# Patient Record
Sex: FEMALE | NOT HISPANIC OR LATINO | Employment: FULL TIME | URBAN - METROPOLITAN AREA
[De-identification: names, ages, dates, MRNs, and addresses within clinical notes are randomized per-mention and may not be internally consistent; named-entity substitution may affect disease eponyms.]

---

## 2019-01-31 ENCOUNTER — OFFICE VISIT (OUTPATIENT)
Dept: URGENT CARE | Facility: CLINIC | Age: 34
End: 2019-01-31
Payer: COMMERCIAL

## 2019-01-31 VITALS
OXYGEN SATURATION: 98 % | SYSTOLIC BLOOD PRESSURE: 122 MMHG | HEIGHT: 64 IN | BODY MASS INDEX: 35.76 KG/M2 | TEMPERATURE: 98 F | RESPIRATION RATE: 18 BRPM | WEIGHT: 209.44 LBS | HEART RATE: 83 BPM | DIASTOLIC BLOOD PRESSURE: 60 MMHG

## 2019-01-31 DIAGNOSIS — J06.9 VIRAL URI WITH COUGH: Primary | ICD-10-CM

## 2019-01-31 LAB
CTP QC/QA: YES
FLUAV AG NPH QL: NEGATIVE
FLUBV AG NPH QL: NEGATIVE

## 2019-01-31 PROCEDURE — 99203 PR OFFICE/OUTPT VISIT, NEW, LEVL III, 30-44 MIN: ICD-10-PCS | Mod: S$GLB,,, | Performed by: FAMILY MEDICINE

## 2019-01-31 PROCEDURE — 87804 POCT INFLUENZA A/B: ICD-10-PCS | Mod: QW,S$GLB,, | Performed by: FAMILY MEDICINE

## 2019-01-31 PROCEDURE — 99203 OFFICE O/P NEW LOW 30 MIN: CPT | Mod: S$GLB,,, | Performed by: FAMILY MEDICINE

## 2019-01-31 PROCEDURE — 3008F PR BODY MASS INDEX (BMI) DOCUMENTED: ICD-10-PCS | Mod: CPTII,S$GLB,, | Performed by: FAMILY MEDICINE

## 2019-01-31 PROCEDURE — 87804 INFLUENZA ASSAY W/OPTIC: CPT | Mod: QW,S$GLB,, | Performed by: FAMILY MEDICINE

## 2019-01-31 PROCEDURE — 3008F BODY MASS INDEX DOCD: CPT | Mod: CPTII,S$GLB,, | Performed by: FAMILY MEDICINE

## 2019-01-31 RX ORDER — BENZONATATE 200 MG/1
200 CAPSULE ORAL 3 TIMES DAILY PRN
Qty: 30 CAPSULE | Refills: 0 | Status: SHIPPED | OUTPATIENT
Start: 2019-01-31 | End: 2020-01-28

## 2019-01-31 NOTE — PATIENT INSTRUCTIONS
PLEASE READ YOUR DISCHARGE INSTRUCTIONS ENTIRELY AS IT CONTAINS IMPORTANT INFORMATION.      Please drink plenty of fluids.    Please get plenty of rest.    Please return here or go to the Emergency Department for any concerns or worsening of condition.    Please take an over the counter antihistamine medication (allegra/Claritin/Zyrtec) of your choice as directed.    Try an over the counter decongestant like Mucinex D or Sudafed.     If you do have Hypertension or palpitations, it is safe to take Coricidin HBP for relief of sinus symptoms.    If not allergic, please take over the counter Tylenol (Acetaminophen) and/or Motrin (Ibuprofen) as directed for control of pain and/or fever.  Please follow up with your primary care doctor or specialist as needed.    Sore throat recommendations: Warm fluids, warm salt water gargles, throat lozenges, tea, honey, soup, rest, hydration.    Use over the counter flonase: one spray each nostril twice daily OR two sprays each nostril once daily.     If you  smoke, please stop smoking.      Please return or see your primary care doctor if you develop new or worsening symptoms.     Please arrange follow up with your primary medical clinic as soon as possible. You must understand that you've received an Urgent Care treatment only and that you may be released before all of your medical problems are known or treated. You, the patient, will arrange for follow up as instructed. If your symptoms worsen or fail to improve you should go to the Emergency Room.    Viral Upper Respiratory Illness (Adult)  You have a viral upper respiratory illness (URI), which is another term for the common cold. This illness is contagious during the first few days. It is spread through the air by coughing and sneezing. It may also be spread by direct contact (touching the sick person and then touching your own eyes, nose, or mouth). Frequent handwashing will decrease risk of spread. Most viral illnesses go away  within 7 to 10 days with rest and simple home remedies. Sometimes the illness may last for several weeks. Antibiotics will not kill a virus, and they are generally not prescribed for this condition.    Home care  · If symptoms are severe, rest at home for the first 2 to 3 days. When you resume activity, don't let yourself get too tired.  · Avoid being exposed to cigarette smoke (yours or others).  · You may use acetaminophen or ibuprofen to control pain and fever, unless another medicine was prescribed. (Note: If you have chronic liver or kidney disease, have ever had a stomach ulcer or gastrointestinal bleeding, or are taking blood-thinning medicines, talk with your healthcare provider before using these medicines.) Aspirin should never be given to anyone under 18 years of age who is ill with a viral infection or fever. It may cause severe liver or brain damage.  · Your appetite may be poor, so a light diet is fine. Avoid dehydration by drinking 6 to 8 glasses of fluids per day (water, soft drinks, juices, tea, or soup). Extra fluids will help loosen secretions in the nose and lungs.  · Over-the-counter cold medicines will not shorten the length of time youre sick, but they may be helpful for the following symptoms: cough, sore throat, and nasal and sinus congestion. (Note: Do not use decongestants if you have high blood pressure.)  Follow-up care  Follow up with your healthcare provider, or as advised.  When to seek medical advice  Call your healthcare provider right away if any of these occur:  · Cough with lots of colored sputum (mucus)  · Severe headache; face, neck, or ear pain  · Difficulty swallowing due to throat pain  · Fever of 100.4°F (38°C)  Call 911, or get immediate medical care  Call emergency services right away if any of these occur:  · Chest pain, shortness of breath, wheezing, or difficulty breathing  · Coughing up blood  · Inability to swallow due to throat pain  Date Last Reviewed:  9/13/2015  © 6032-6170 [employer]. [address] [place] [address], [place] [address]. All rights reserved. This information is not intended as a substitute for professional medical care. Always follow your healthcare professional's instructions.

## 2019-01-31 NOTE — PROGRESS NOTES
"Subjective:       Patient ID: Shaina Mas is a 33 y.o. female.    Vitals:  height is 5' 4" (1.626 m) and weight is 95 kg (209 lb 7 oz). Her tympanic temperature is 98.2 °F (36.8 °C). Her blood pressure is 122/60 and her pulse is 83. Her respiration is 18 and oxygen saturation is 98%.     Chief Complaint: Fever    Patient presents with c/o fever that presented 4 days ago. 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 Concerned for flu. Nsaids taken. Did not get vaccine.       Fever    This is a new problem. Episode onset: Monday. The problem has been unchanged. Associated symptoms include coughing and a sore throat. Pertinent negatives include no congestion, ear pain, nausea, rash, vomiting or wheezing. She has tried NSAIDs for the symptoms.       Constitution: Positive for fever. Negative for chills, sweating and fatigue.   HENT: Positive for sore throat. Negative for ear pain, congestion, sinus pain, sinus pressure and voice change.    Neck: Negative for painful lymph nodes.   Eyes: Negative for eye redness.   Respiratory: Positive for cough. Negative for chest tightness, sputum production, bloody sputum, COPD, shortness of breath, stridor, wheezing and asthma.    Gastrointestinal: Negative for nausea and vomiting.   Musculoskeletal: Negative for muscle ache.   Skin: Negative for rash.   Allergic/Immunologic: Negative for seasonal allergies and asthma.   Hematologic/Lymphatic: Negative for swollen lymph nodes.       Objective:      Physical Exam   Constitutional: She is oriented to person, place, and time. She appears well-developed and well-nourished. She is cooperative.  Non-toxic appearance. She does not appear ill. No distress.   HENT:   Head: Normocephalic and atraumatic.   Right Ear: Hearing, tympanic membrane, external ear and ear canal normal.   Left Ear: Hearing, tympanic membrane, external ear and ear canal normal.   Nose: Mucosal edema present. No rhinorrhea or " nasal deformity. No epistaxis. Right sinus exhibits no maxillary sinus tenderness and no frontal sinus tenderness. Left sinus exhibits no maxillary sinus tenderness and no frontal sinus tenderness.   Mouth/Throat: Uvula is midline, oropharynx is clear and moist and mucous membranes are normal. No trismus in the jaw. Normal dentition. No uvula swelling. No oropharyngeal exudate or posterior oropharyngeal erythema.   Eyes: Conjunctivae and lids are normal. No scleral icterus.   Sclera clear bilat   Neck: Trachea normal, full passive range of motion without pain and phonation normal. Neck supple.   Cardiovascular: Normal rate, regular rhythm, normal heart sounds, intact distal pulses and normal pulses.   Pulmonary/Chest: Effort normal and breath sounds normal. No respiratory distress. She has no decreased breath sounds. She has no wheezes. She has no rhonchi. She has no rales.   Abdominal: Soft. Normal appearance and bowel sounds are normal. She exhibits no distension. There is no tenderness.   Musculoskeletal: Normal range of motion. She exhibits no edema or deformity.   Neurological: She is alert and oriented to person, place, and time. She exhibits normal muscle tone. Coordination normal.   Skin: Skin is warm, dry and intact. She is not diaphoretic. No pallor.   Psychiatric: She has a normal mood and affect. Her speech is normal and behavior is normal. Judgment and thought content normal. Cognition and memory are normal.   Nursing note and vitals reviewed.      Results for orders placed or performed in visit on 01/31/19   POCT Influenza A/B   Result Value Ref Range    Rapid Influenza A Ag Negative Negative    Rapid Influenza B Ag Negative Negative     Acceptable Yes        Assessment:       1. Viral URI with cough        Plan:         Viral URI with cough  -     POCT Influenza A/B  -     benzonatate (TESSALON) 200 MG capsule; Take 1 capsule (200 mg total) by mouth 3 (three) times daily as needed for  Cough.  Dispense: 30 capsule; Refill: 0          Patient Instructions   PLEASE READ YOUR DISCHARGE INSTRUCTIONS ENTIRELY AS IT CONTAINS IMPORTANT INFORMATION.      Please drink plenty of fluids.    Please get plenty of rest.    Please return here or go to the Emergency Department for any concerns or worsening of condition.    Please take an over the counter antihistamine medication (allegra/Claritin/Zyrtec) of your choice as directed.    Try an over the counter decongestant like Mucinex D or Sudafed.     If you do have Hypertension or palpitations, it is safe to take Coricidin HBP for relief of sinus symptoms.    If not allergic, please take over the counter Tylenol (Acetaminophen) and/or Motrin (Ibuprofen) as directed for control of pain and/or fever.  Please follow up with your primary care doctor or specialist as needed.    Sore throat recommendations: Warm fluids, warm salt water gargles, throat lozenges, tea, honey, soup, rest, hydration.    Use over the counter flonase: one spray each nostril twice daily OR two sprays each nostril once daily.     If you  smoke, please stop smoking.      Please return or see your primary care doctor if you develop new or worsening symptoms.     Please arrange follow up with your primary medical clinic as soon as possible. You must understand that you've received an Urgent Care treatment only and that you may be released before all of your medical problems are known or treated. You, the patient, will arrange for follow up as instructed. If your symptoms worsen or fail to improve you should go to the Emergency Room.    Viral Upper Respiratory Illness (Adult)  You have a viral upper respiratory illness (URI), which is another term for the common cold. This illness is contagious during the first few days. It is spread through the air by coughing and sneezing. It may also be spread by direct contact (touching the sick person and then touching your own eyes, nose, or mouth). Frequent  handwashing will decrease risk of spread. Most viral illnesses go away within 7 to 10 days with rest and simple home remedies. Sometimes the illness may last for several weeks. Antibiotics will not kill a virus, and they are generally not prescribed for this condition.    Home care  · If symptoms are severe, rest at home for the first 2 to 3 days. When you resume activity, don't let yourself get too tired.  · Avoid being exposed to cigarette smoke (yours or others).  · You may use acetaminophen or ibuprofen to control pain and fever, unless another medicine was prescribed. (Note: If you have chronic liver or kidney disease, have ever had a stomach ulcer or gastrointestinal bleeding, or are taking blood-thinning medicines, talk with your healthcare provider before using these medicines.) Aspirin should never be given to anyone under 18 years of age who is ill with a viral infection or fever. It may cause severe liver or brain damage.  · Your appetite may be poor, so a light diet is fine. Avoid dehydration by drinking 6 to 8 glasses of fluids per day (water, soft drinks, juices, tea, or soup). Extra fluids will help loosen secretions in the nose and lungs.  · Over-the-counter cold medicines will not shorten the length of time youre sick, but they may be helpful for the following symptoms: cough, sore throat, and nasal and sinus congestion. (Note: Do not use decongestants if you have high blood pressure.)  Follow-up care  Follow up with your healthcare provider, or as advised.  When to seek medical advice  Call your healthcare provider right away if any of these occur:  · Cough with lots of colored sputum (mucus)  · Severe headache; face, neck, or ear pain  · Difficulty swallowing due to throat pain  · Fever of 100.4°F (38°C)  Call 911, or get immediate medical care  Call emergency services right away if any of these occur:  · Chest pain, shortness of breath, wheezing, or difficulty breathing  · Coughing up  blood  · Inability to swallow due to throat pain  Date Last Reviewed: 9/13/2015  © 7544-1921 [employer], [employer]. [address] [place] [address], [place] [address]. All rights reserved. This information is not intended as a substitute for professional medical care. Always follow your healthcare professional's instructions.

## 2019-01-31 NOTE — LETTER
January 31, 2019      [facility]  [address] [facility] [address]  Phone: [phone]  Fax: [phone]       Patient: Shaina Mas   YOB: 1985  Date of Visit: 01/31/2019    To Whom It May Concern:    Nevaeh Mas  was at [facility] on 01/31/2019. She may return to work/school on 2/4/19 with no restrictions. If you have any questions or concerns, or if I can be of further assistance, please do not hesitate to contact me.    Sincerely,        Omari Muse NP

## 2020-01-28 ENCOUNTER — OFFICE VISIT (OUTPATIENT)
Dept: URGENT CARE | Facility: CLINIC | Age: 35
End: 2020-01-28
Payer: COMMERCIAL

## 2020-01-28 VITALS
HEIGHT: 65 IN | WEIGHT: 209 LBS | SYSTOLIC BLOOD PRESSURE: 140 MMHG | TEMPERATURE: 98 F | DIASTOLIC BLOOD PRESSURE: 88 MMHG | OXYGEN SATURATION: 96 % | RESPIRATION RATE: 18 BRPM | BODY MASS INDEX: 34.82 KG/M2 | HEART RATE: 102 BPM

## 2020-01-28 DIAGNOSIS — R68.89 FLU-LIKE SYMPTOMS: ICD-10-CM

## 2020-01-28 DIAGNOSIS — J06.9 UPPER RESPIRATORY TRACT INFECTION, UNSPECIFIED TYPE: Primary | ICD-10-CM

## 2020-01-28 DIAGNOSIS — R31.9 HEMATURIA, UNSPECIFIED TYPE: ICD-10-CM

## 2020-01-28 DIAGNOSIS — Z32.00 POSSIBLE PREGNANCY: ICD-10-CM

## 2020-01-28 DIAGNOSIS — R05.9 COUGH: ICD-10-CM

## 2020-01-28 LAB
B-HCG UR QL: NEGATIVE
BILIRUB UR QL STRIP: NEGATIVE
CTP QC/QA: YES
CTP QC/QA: YES
FLUAV AG NPH QL: NEGATIVE
FLUBV AG NPH QL: NEGATIVE
GLUCOSE UR QL STRIP: NEGATIVE
KETONES UR QL STRIP: NEGATIVE
LEUKOCYTE ESTERASE UR QL STRIP: NEGATIVE
PH, POC UA: 5 (ref 5–8)
POC BLOOD, URINE: POSITIVE
POC NITRATES, URINE: NEGATIVE
PROT UR QL STRIP: NEGATIVE
SP GR UR STRIP: 1.01 (ref 1–1.03)
UROBILINOGEN UR STRIP-ACNC: NORMAL (ref 0.1–1.1)

## 2020-01-28 PROCEDURE — 99000 PR SPECIMEN HANDLING,DR OFF->LAB: ICD-10-PCS | Mod: S$GLB,,, | Performed by: EMERGENCY MEDICINE

## 2020-01-28 PROCEDURE — 81025 URINE PREGNANCY TEST: CPT | Mod: S$GLB,,, | Performed by: NURSE PRACTITIONER

## 2020-01-28 PROCEDURE — 81003 POCT URINALYSIS, DIPSTICK, AUTOMATED, W/O SCOPE: ICD-10-PCS | Mod: QW,S$GLB,, | Performed by: NURSE PRACTITIONER

## 2020-01-28 PROCEDURE — 87804 POCT INFLUENZA A/B: ICD-10-PCS | Mod: QW,S$GLB,, | Performed by: NURSE PRACTITIONER

## 2020-01-28 PROCEDURE — 99000 SPECIMEN HANDLING OFFICE-LAB: CPT | Mod: S$GLB,,, | Performed by: EMERGENCY MEDICINE

## 2020-01-28 PROCEDURE — 99214 PR OFFICE/OUTPT VISIT, EST, LEVL IV, 30-39 MIN: ICD-10-PCS | Mod: 25,S$GLB,, | Performed by: NURSE PRACTITIONER

## 2020-01-28 PROCEDURE — 87804 INFLUENZA ASSAY W/OPTIC: CPT | Mod: QW,S$GLB,, | Performed by: NURSE PRACTITIONER

## 2020-01-28 PROCEDURE — 81003 URINALYSIS AUTO W/O SCOPE: CPT | Mod: QW,S$GLB,, | Performed by: NURSE PRACTITIONER

## 2020-01-28 PROCEDURE — 87086 URINE CULTURE/COLONY COUNT: CPT

## 2020-01-28 PROCEDURE — 81025 POCT URINE PREGNANCY: ICD-10-PCS | Mod: S$GLB,,, | Performed by: NURSE PRACTITIONER

## 2020-01-28 PROCEDURE — 99214 OFFICE O/P EST MOD 30 MIN: CPT | Mod: 25,S$GLB,, | Performed by: NURSE PRACTITIONER

## 2020-01-28 RX ORDER — ALBUTEROL SULFATE 90 UG/1
2 AEROSOL, METERED RESPIRATORY (INHALATION) EVERY 6 HOURS PRN
Qty: 18 G | Refills: 0 | Status: SHIPPED | OUTPATIENT
Start: 2020-01-28 | End: 2023-05-11 | Stop reason: SDUPTHER

## 2020-01-28 RX ORDER — BENZONATATE 100 MG/1
100 CAPSULE ORAL 3 TIMES DAILY PRN
Qty: 20 CAPSULE | Refills: 0 | Status: SHIPPED | OUTPATIENT
Start: 2020-01-28 | End: 2022-06-16

## 2020-01-28 RX ORDER — PROMETHAZINE HYDROCHLORIDE AND DEXTROMETHORPHAN HYDROBROMIDE 6.25; 15 MG/5ML; MG/5ML
5 SYRUP ORAL NIGHTLY PRN
Qty: 120 ML | Refills: 0 | Status: SHIPPED | OUTPATIENT
Start: 2020-01-28 | End: 2020-02-07

## 2020-01-28 NOTE — PROGRESS NOTES
"Subjective:       Patient ID: Shaina Mas is a 34 y.o. female.    Vitals:  height is 5' 4.96" (1.65 m) and weight is 94.8 kg (209 lb). Her temperature is 98.3 °F (36.8 °C). Her blood pressure is 140/88 (abnormal) and her pulse is 102. Her respiration is 18 and oxygen saturation is 96%.     Chief Complaint: Cough    Pt c/o body aches, cough, fever,chills, sweats that began 2 days ago. Pt says she is also having light vaginal bleeding and breast tenderness. Pt says she has a history of irregular periods.   Provider note begins below  Pt has been using plan B lately.  She reports smoking recenlty and this may be the cause of the cough.      Cough   This is a new problem. The current episode started in the past 7 days. The problem has been unchanged. The problem occurs constantly. The cough is productive of sputum. Associated symptoms include chills, a fever and myalgias. Pertinent negatives include no ear pain, eye redness, hemoptysis, rash, sore throat, shortness of breath or wheezing. Treatments tried: nyquil. The treatment provided no relief.       Constitution: Positive for chills, sweating, fatigue and fever.   HENT: Negative for ear pain, congestion, sinus pain, sinus pressure, sore throat and voice change.    Neck: Negative for painful lymph nodes.   Eyes: Negative for eye redness.   Respiratory: Positive for cough and sputum production. Negative for chest tightness, bloody sputum, COPD, shortness of breath, stridor, wheezing and asthma.    Gastrointestinal: Negative for nausea and vomiting.   Genitourinary: Positive for vaginal bleeding.   Musculoskeletal: Positive for muscle ache.   Skin: Negative for rash.   Allergic/Immunologic: Negative for seasonal allergies and asthma.   Hematologic/Lymphatic: Negative for swollen lymph nodes.       Objective:      Physical Exam   Constitutional: She is oriented to person, place, and time. She appears well-developed and well-nourished. She is cooperative.  Non-toxic " appearance. She does not have a sickly appearance. She does not appear ill. No distress.   HENT:   Head: Normocephalic and atraumatic.   Right Ear: Hearing, tympanic membrane, external ear and ear canal normal.   Left Ear: Hearing, tympanic membrane, external ear and ear canal normal.   Nose: Nose normal. No mucosal edema, rhinorrhea or nasal deformity. No epistaxis. Right sinus exhibits no maxillary sinus tenderness and no frontal sinus tenderness. Left sinus exhibits no maxillary sinus tenderness and no frontal sinus tenderness.   Mouth/Throat: Uvula is midline, oropharynx is clear and moist and mucous membranes are normal. No trismus in the jaw. Normal dentition. No uvula swelling. No oropharyngeal exudate, posterior oropharyngeal edema or posterior oropharyngeal erythema.   Eyes: Conjunctivae and lids are normal. No scleral icterus.   Neck: Trachea normal, full passive range of motion without pain and phonation normal. Neck supple. No neck rigidity. No edema and no erythema present.   Cardiovascular: Normal rate, regular rhythm, normal heart sounds, intact distal pulses and normal pulses.   Pulmonary/Chest: Effort normal and breath sounds normal. No stridor. No respiratory distress. She has no decreased breath sounds. She has no wheezes. She has no rhonchi. She has no rales.   Abdominal: Normal appearance and bowel sounds are normal. There is no tenderness. There is no rigidity, no rebound, no guarding and no CVA tenderness.   Musculoskeletal: Normal range of motion. She exhibits no edema or deformity.   Neurological: She is alert and oriented to person, place, and time. She exhibits normal muscle tone. Coordination normal.   Skin: Skin is warm, dry, intact, not diaphoretic and not pale.   Psychiatric: She has a normal mood and affect. Her speech is normal and behavior is normal. Judgment and thought content normal. Cognition and memory are normal.   Nursing note and vitals reviewed.          Results for orders  placed or performed in visit on 01/28/20   POCT Influenza A/B   Result Value Ref Range    Rapid Influenza A Ag Negative Negative    Rapid Influenza B Ag Negative Negative     Acceptable Yes    POCT urine pregnancy   Result Value Ref Range    POC Preg Test, Ur Negative Negative     Acceptable Yes    POCT Urinalysis, Dipstick, Automated, W/O Scope   Result Value Ref Range    POC Blood, Urine Positive (A) Negative    POC Bilirubin, Urine Negative Negative    POC Urobilinogen, Urine normal 0.1 - 1.1    POC Ketones, Urine Negative Negative    POC Protein, Urine Negative Negative    POC Nitrates, Urine Negative Negative    POC Glucose, Urine Negative Negative    pH, UA 5.0 5 - 8    POC Specific Gravity, Urine 1.015 1.003 - 1.029    POC Leukocytes, Urine Negative Negative     Assessment:       1. Upper respiratory tract infection, unspecified type    2. Flu-like symptoms    3. Possible pregnancy    4. Cough    5. Hematuria, unspecified type        Plan:       UPT negative.  Negative flu test.  Will send urine for culture.    Upper respiratory tract infection, unspecified type  -     albuterol (PROVENTIL/VENTOLIN HFA) 90 mcg/actuation inhaler; Inhale 2 puffs into the lungs every 6 (six) hours as needed for Wheezing or Shortness of Breath. Rescue  Dispense: 18 g; Refill: 0  -     benzonatate (TESSALON PERLES) 100 MG capsule; Take 1 capsule (100 mg total) by mouth 3 (three) times daily as needed.  Dispense: 20 capsule; Refill: 0    Flu-like symptoms  -     POCT Influenza A/B    Possible pregnancy  -     POCT urine pregnancy    Cough  -     albuterol (PROVENTIL/VENTOLIN HFA) 90 mcg/actuation inhaler; Inhale 2 puffs into the lungs every 6 (six) hours as needed for Wheezing or Shortness of Breath. Rescue  Dispense: 18 g; Refill: 0  -     benzonatate (TESSALON PERLES) 100 MG capsule; Take 1 capsule (100 mg total) by mouth 3 (three) times daily as needed.  Dispense: 20 capsule; Refill: 0    Hematuria,  unspecified type  -     Urine culture  -     POCT Urinalysis, Dipstick, Automated, W/O Scope    Other orders  -     promethazine-dextromethorphan (PROMETHAZINE-DM) 6.25-15 mg/5 mL Syrp; Take 5 mLs by mouth nightly as needed.  Dispense: 120 mL; Refill: 0         Patient Instructions     Viral Upper Respiratory Illness (Adult)  You have a viral upper respiratory illness (URI), which is another term for the common cold. This illness is contagious during the first few days. It is spread through the air by coughing and sneezing. It may also be spread by direct contact (touching the sick person and then touching your own eyes, nose, or mouth). Frequent handwashing will decrease risk of spread. Most viral illnesses go away within 7 to 10 days with rest and simple home remedies. Sometimes the illness may last for several weeks. Antibiotics will not kill a virus, and they are generally not prescribed for this condition.    Home care  · If symptoms are severe, rest at home for the first 2 to 3 days. When you resume activity, don't let yourself get too tired.  · Avoid being exposed to cigarette smoke (yours or others).  · You may use acetaminophen or ibuprofen to control pain and fever, unless another medicine was prescribed. (Note: If you have chronic liver or kidney disease, have ever had a stomach ulcer or gastrointestinal bleeding, or are taking blood-thinning medicines, talk with your healthcare provider before using these medicines.) Aspirin should never be given to anyone under 18 years of age who is ill with a viral infection or fever. It may cause severe liver or brain damage.  · Your appetite may be poor, so a light diet is fine. Avoid dehydration by drinking 6 to 8 glasses of fluids per day (water, soft drinks, juices, tea, or soup). Extra fluids will help loosen secretions in the nose and lungs.  · Over-the-counter cold medicines will not shorten the length of time youre sick, but they may be helpful for the  following symptoms: cough, sore throat, and nasal and sinus congestion. (Note: Do not use decongestants if you have high blood pressure.)  Follow-up care  Follow up with your healthcare provider, or as advised.  When to seek medical advice  Call your healthcare provider right away if any of these occur:  · Cough with lots of colored sputum (mucus)  · Severe headache; face, neck, or ear pain  · Difficulty swallowing due to throat pain  · Fever of 100.4°F (38°C)  Call 911, or get immediate medical care  Call emergency services right away if any of these occur:  · Chest pain, shortness of breath, wheezing, or difficulty breathing  · Coughing up blood  · Inability to swallow due to throat pain  Date Last Reviewed: 9/13/2015  © 4334-9619 [employer]. [address] [place] [address], [place] [address]. All rights reserved. This information is not intended as a substitute for professional medical care. Always follow your healthcare professional's instructions.

## 2020-01-28 NOTE — PATIENT INSTRUCTIONS
Viral Upper Respiratory Illness (Adult)  You have a viral upper respiratory illness (URI), which is another term for the common cold. This illness is contagious during the first few days. It is spread through the air by coughing and sneezing. It may also be spread by direct contact (touching the sick person and then touching your own eyes, nose, or mouth). Frequent handwashing will decrease risk of spread. Most viral illnesses go away within 7 to 10 days with rest and simple home remedies. Sometimes the illness may last for several weeks. Antibiotics will not kill a virus, and they are generally not prescribed for this condition.    Home care  · If symptoms are severe, rest at home for the first 2 to 3 days. When you resume activity, don't let yourself get too tired.  · Avoid being exposed to cigarette smoke (yours or others).  · You may use acetaminophen or ibuprofen to control pain and fever, unless another medicine was prescribed. (Note: If you have chronic liver or kidney disease, have ever had a stomach ulcer or gastrointestinal bleeding, or are taking blood-thinning medicines, talk with your healthcare provider before using these medicines.) Aspirin should never be given to anyone under 18 years of age who is ill with a viral infection or fever. It may cause severe liver or brain damage.  · Your appetite may be poor, so a light diet is fine. Avoid dehydration by drinking 6 to 8 glasses of fluids per day (water, soft drinks, juices, tea, or soup). Extra fluids will help loosen secretions in the nose and lungs.  · Over-the-counter cold medicines will not shorten the length of time youre sick, but they may be helpful for the following symptoms: cough, sore throat, and nasal and sinus congestion. (Note: Do not use decongestants if you have high blood pressure.)  Follow-up care  Follow up with your healthcare provider, or as advised.  When to seek medical advice  Call your healthcare provider right away if any  of these occur:  · Cough with lots of colored sputum (mucus)  · Severe headache; face, neck, or ear pain  · Difficulty swallowing due to throat pain  · Fever of 100.4°F (38°C)  Call 911, or get immediate medical care  Call emergency services right away if any of these occur:  · Chest pain, shortness of breath, wheezing, or difficulty breathing  · Coughing up blood  · Inability to swallow due to throat pain  Date Last Reviewed: 9/13/2015  © 1781-9312 [employer]. [address] [place] [address]. All rights reserved. This information is not intended as a substitute for professional medical care. Always follow your healthcare professional's instructions.

## 2020-01-28 NOTE — LETTER
January 28, 2020      [facility]  [address] [facility] [address]  Phone: [phone]  Fax: [phone]       Patient: Shaina Mas   YOB: 1985  Date of Visit: 01/28/2020    To Whom It May Concern:    Nevaeh Mas  was at [facility] on 01/28/2020. She may return to work/school on 1/29 with no restrictions. If you have any questions or concerns, or if I can be of further assistance, please do not hesitate to contact me.    Sincerely,    Emma Sánchez, NP

## 2020-01-30 LAB
BACTERIA UR CULT: NORMAL
BACTERIA UR CULT: NORMAL

## 2020-02-02 ENCOUNTER — TELEPHONE (OUTPATIENT)
Dept: URGENT CARE | Facility: CLINIC | Age: 35
End: 2020-02-02

## 2020-11-04 ENCOUNTER — OFFICE VISIT (OUTPATIENT)
Dept: URGENT CARE | Facility: CLINIC | Age: 35
End: 2020-11-04
Payer: COMMERCIAL

## 2020-11-04 VITALS
SYSTOLIC BLOOD PRESSURE: 151 MMHG | WEIGHT: 246.94 LBS | TEMPERATURE: 99 F | HEART RATE: 86 BPM | HEIGHT: 65 IN | BODY MASS INDEX: 41.14 KG/M2 | OXYGEN SATURATION: 98 % | DIASTOLIC BLOOD PRESSURE: 85 MMHG | RESPIRATION RATE: 16 BRPM

## 2020-11-04 DIAGNOSIS — Z20.822 SUSPECTED COVID-19 VIRUS INFECTION: Primary | ICD-10-CM

## 2020-11-04 DIAGNOSIS — R05.9 COUGH: ICD-10-CM

## 2020-11-04 LAB
CTP QC/QA: YES
SARS-COV-2 RDRP RESP QL NAA+PROBE: POSITIVE

## 2020-11-04 PROCEDURE — 99212 OFFICE O/P EST SF 10 MIN: CPT | Mod: S$GLB,CS,, | Performed by: FAMILY MEDICINE

## 2020-11-04 PROCEDURE — 99212 PR OFFICE/OUTPT VISIT, EST, LEVL II, 10-19 MIN: ICD-10-PCS | Mod: S$GLB,CS,, | Performed by: FAMILY MEDICINE

## 2020-11-04 PROCEDURE — U0002: ICD-10-PCS | Mod: QW,S$GLB,, | Performed by: FAMILY MEDICINE

## 2020-11-04 PROCEDURE — U0002 COVID-19 LAB TEST NON-CDC: HCPCS | Mod: QW,S$GLB,, | Performed by: FAMILY MEDICINE

## 2020-11-04 RX ORDER — BENZONATATE 200 MG/1
200 CAPSULE ORAL 3 TIMES DAILY PRN
Qty: 30 CAPSULE | Refills: 1 | Status: SHIPPED | OUTPATIENT
Start: 2020-11-04 | End: 2020-11-14

## 2020-11-04 RX ORDER — BENZONATATE 200 MG/1
200 CAPSULE ORAL 3 TIMES DAILY PRN
Qty: 30 CAPSULE | Refills: 1 | Status: SHIPPED | OUTPATIENT
Start: 2020-11-04 | End: 2020-11-04 | Stop reason: CLARIF

## 2020-11-04 NOTE — PROGRESS NOTES
"       Patient ID: Shaina Mas is a 35 y.o. female.    Vitals:  height is 5' 4.96" (1.65 m) and weight is 112 kg (246 lb 14.6 oz). Her temperature is 99.1 °F (37.3 °C). Her blood pressure is 151/85 (abnormal) and her pulse is 86. Her respiration is 16 and oxygen saturation is 98%.     Chief Complaint: COVID-19 Concerns    Pt presents today with the following: dry cough, fever (highest being 100.9 on this morning), headache, sore throat, and body aches- all of which have been going on for the last X2 days now.  No known definite exposures to COVID.  No history of asthma or reactive airways.    At home treatments trieD: tiffanie sandra, throat spray    URI   This is a new problem. The current episode started in the past 7 days. Associated symptoms include coughing, headaches and a sore throat. Pertinent negatives include no chest pain, congestion, diarrhea, dysuria, nausea, rash or vomiting.       Constitution: Positive for fever. Negative for chills and fatigue.   HENT: Positive for sore throat. Negative for congestion.    Neck: Negative for painful lymph nodes.   Cardiovascular: Negative for chest pain and leg swelling.   Eyes: Negative for double vision and blurred vision.   Respiratory: Positive for cough. Negative for shortness of breath.    Gastrointestinal: Negative for nausea, vomiting and diarrhea.   Genitourinary: Negative for dysuria, frequency, urgency and history of kidney stones.   Musculoskeletal: Positive for pain and muscle ache. Negative for joint pain, joint swelling and muscle cramps.   Skin: Negative for color change, pale, rash and bruising.   Allergic/Immunologic: Negative for seasonal allergies.   Neurological: Positive for headaches. Negative for dizziness, history of vertigo, light-headedness and passing out.   Hematologic/Lymphatic: Negative for swollen lymph nodes.   Psychiatric/Behavioral: Negative for nervous/anxious, sleep disturbance and depression. The patient is not nervous/anxious.  "       Objective:      Physical Exam   Constitutional: She is oriented to person, place, and time.  Non-toxic appearance. She does not appear ill. No distress.   HENT:   Head: Normocephalic and atraumatic.   Pulmonary/Chest: Effort normal. No stridor. No respiratory distress.   Abdominal: Normal appearance.   Neurological: She is alert and oriented to person, place, and time.   Skin: Skin is not diaphoretic. Psychiatric: Her behavior is normal. Mood and thought content normal.   Nursing note and vitals reviewed.        Results for orders placed or performed in visit on 11/04/20   POCT COVID-19 Rapid Screening   Result Value Ref Range    POC Rapid COVID Positive (A) Negative     Acceptable Yes      Assessment:       1. Suspected COVID-19 virus infection    2. Cough        Plan:     - reviewed guidelines for ER evaluation    Suspected COVID-19 virus infection  -     COVID-19 Home Symptom Monitoring  - Duration (days): 14    Cough  -     POCT COVID-19 Rapid Screening  -     Discontinue: benzonatate (TESSALON) 200 MG capsule; Take 1 capsule (200 mg total) by mouth 3 (three) times daily as needed for Cough.  Dispense: 30 capsule; Refill: 1  -     benzonatate (TESSALON) 200 MG capsule; Take 1 capsule (200 mg total) by mouth 3 (three) times daily as needed for Cough.  Dispense: 30 capsule; Refill: 1    You have tested positive for COVID-19 today. Per the CDC, you are now in a 10 day isolation.    This isolation starts from the day you first developed symptoms, not the day of your positive test. For example, if your symptoms began on a Monday, and you waited until Friday of the same week to get tested, and it was positive, your 10 day isolation begins from that Monday, not the Friday you tested positive.  However, if you are asymptomatic (a person who does not have any symptoms), and received a COVID-19 test that was positive, your 10 day isolation begins on the day you tested positive. This is regardless if  you were exposed to a known positive days earlier. So for example, if you test positive as an asymptomatic on day 7 from exposure, you have now extended your 14 day quarantine to a 17 day quarantine.    Also, per the CDC guidelines, once your 10 days have passed, and you have not had fever greater than 100.4F in the last 24 hours without taking any fever reducers such as Tylenol (Acetaminophen) or Motrin (Ibuprofen), you may return to your normal activities including social distancing, wearing masks, and frequent handwashing - YOU DO NOT NEED ANOTHER TEST, OR TO TEST NEGATIVE, IN ORDER TO END YOUR QUARANTINE!

## 2020-11-04 NOTE — LETTER
[address] ? [place], [address] ? Phone [phone] ? Fax [phone]           Return to Work/School Status    Patient: Shaina Mas  YOB: 1985   Date: 11/04/2020      Gill had a positive COVID test when seen today on November 4th.  She reports the onset of symptoms on November 2nd.    [facility] has adopted CDCs time-based return to work/school strategy for persons with confirmed or suspected COVID19. [facility] does not recommend using a test-based strategy for returning to work/school after COVID-19 infection. [facility] has reported prolonged positive test results without evidence of infectiousness. At this time, positive specimens capable of producing disease have not been isolated more than 9 days after onset of illness.   Symptomatic persons with confirmed COVID-19 or suspected COVID-19 can return to work/school after:    At least 1 days (24 hours) have passed since recovery defined as resolution of fever without the use of fever-reducing medications AND improvement in respiratory symptoms (e.g., cough, shortness of breath)    AND, at least 10 days have passed since the onset of symptoms.    More information about the science behind the symptom-based return to work/school can be found at: https://www.cdc.gov/coronavirus/2019-ncov/community/fpzuzszv-kezpxsaojrr-wdwxwuugy.html          Sincerely,          Anusha Corea MD

## 2020-11-05 ENCOUNTER — NURSE TRIAGE (OUTPATIENT)
Dept: ADMINISTRATIVE | Facility: CLINIC | Age: 35
End: 2020-11-05

## 2020-11-05 NOTE — TELEPHONE ENCOUNTER
Spoke to patient. She went to Urgent Care yesterday and tested positive for COVID 19. States she is calling regarding medication for cough. Instructed her that Tessalon was ordered for her cough. She didn't realize that was the medication for cough. Also discussed cough drops, OTC cough medication and humidifier. States she also has HA and feels as though she's had fever-hasn't taken temp. Denies SOB.   #2 attempt to contact patient. No answer.  #1 attempt to contact patient on behalf of COVID symptom tracker. No answer.     Reason for Disposition   Information only question and nurse able to answer    Additional Information   Negative: Nursing judgment   Negative: Nursing judgment   Negative: Nursing judgment   Negative: Nursing judgment    Protocols used: INFORMATION ONLY CALL - NO TRIAGE-A-OH

## 2020-11-15 ENCOUNTER — OFFICE VISIT (OUTPATIENT)
Dept: URGENT CARE | Facility: CLINIC | Age: 35
End: 2020-11-15
Payer: COMMERCIAL

## 2020-11-15 VITALS
SYSTOLIC BLOOD PRESSURE: 122 MMHG | RESPIRATION RATE: 12 BRPM | BODY MASS INDEX: 42 KG/M2 | TEMPERATURE: 98 F | OXYGEN SATURATION: 98 % | HEART RATE: 85 BPM | HEIGHT: 64 IN | DIASTOLIC BLOOD PRESSURE: 79 MMHG | WEIGHT: 246 LBS

## 2020-11-15 DIAGNOSIS — R05.9 COUGH: ICD-10-CM

## 2020-11-15 DIAGNOSIS — J40 BRONCHITIS DUE TO COVID-19 VIRUS: Primary | ICD-10-CM

## 2020-11-15 DIAGNOSIS — U07.1 BRONCHITIS DUE TO COVID-19 VIRUS: Primary | ICD-10-CM

## 2020-11-15 DIAGNOSIS — J98.01 BRONCHOSPASM: ICD-10-CM

## 2020-11-15 PROCEDURE — 3008F BODY MASS INDEX DOCD: CPT | Mod: CPTII,S$GLB,, | Performed by: FAMILY MEDICINE

## 2020-11-15 PROCEDURE — 99214 OFFICE O/P EST MOD 30 MIN: CPT | Mod: S$GLB,,, | Performed by: FAMILY MEDICINE

## 2020-11-15 PROCEDURE — 71046 XR CHEST PA AND LATERAL: ICD-10-PCS | Mod: FY,S$GLB,, | Performed by: RADIOLOGY

## 2020-11-15 PROCEDURE — 71046 X-RAY EXAM CHEST 2 VIEWS: CPT | Mod: FY,S$GLB,, | Performed by: RADIOLOGY

## 2020-11-15 PROCEDURE — 99214 PR OFFICE/OUTPT VISIT, EST, LEVL IV, 30-39 MIN: ICD-10-PCS | Mod: S$GLB,,, | Performed by: FAMILY MEDICINE

## 2020-11-15 PROCEDURE — 3008F PR BODY MASS INDEX (BMI) DOCUMENTED: ICD-10-PCS | Mod: CPTII,S$GLB,, | Performed by: FAMILY MEDICINE

## 2020-11-15 NOTE — PATIENT INSTRUCTIONS
Inhaler Use  The inhaler that you were prescribed contains a potent medicine. It should only be used as directed. The medicine in your inhaler must be breathed deeply into your lungs for it to work. It will not work at all if it only reaches your mouth and throat. Follow the instructions below for best results. And remember to follow your asthma action plan as given to you by your doctor.  1. Keep your inhaler at room temperature.  2. Hold the inhaler so that the part that goes into your mouth is at the bottom.  3. Shake the inhaler well and remove the cap.  4. Breathe out through your mouth to fully empty your lungs.  5. Place the inhaler in your mouth and close your lips tightly around it. (Or hold the inhaler 1 to 2 inches from your open mouth if told to do so by your healthcare provider.)  6. Squeeze the inhaler as you breathe in slowly through your mouth until your lungs are full of air, drawing the medicine deep into your lungs.  7. Hold your breath for 10 seconds, or as long as you can comfortably hold your breath. Then breathe out slowly.  8. If you have been advised to take 2 puffs, wait 5 minutes, then repeat steps 3-7 above. Waiting 5 minutes between puffs will alow the medicine to open up your lungs so the second puff can get deeper into the lungs. Replace the cap when done.  9. If you were prescribed both a steroid inhaler and a bronchodilator inhaler, use the bronchodilator first to open the air passages. Wait 5 minutes, then use the steroid inhaler.  10. Rinse your mouth with water and spit it out (especially after using a steroid inhaler). This is very important if you are using a steroid inhaler to prevent thrush, a mild yeast infection of the mouth and back of the throat.  11. A special chamber (spacer) may be prescribed that attaches to your inhaler. This increases the amount of medicine that goes to your lungs. It also improves how well each treatment works. Ask your doctor about this if you  did not receive one.    Keep it clean  Remove the metal canister and do not immerse it in water. Then clean the plastic mouthpiece, cap, and spacer if you have one, by rinsing them well in warm running water for 30 to 60 seconds. Shake off excess water and allow the mouthpiece to dry completely (overnight is recommended). This should be done once a week. If you need the inhaler before the mouthpiece is dry, shake off excess water, replace canister, and test spray 2 times (away from the face).  Warning  A steroid inhaler is used to prevent an asthma attack. Do not use this to treat an acute wheezing episode. Use only bronchodilator inhalers (quick relief) to treat an acute asthma attack.  If you find that your medicine is not working and you need to use it more often than prescribed, this could be a sign that your asthma is getting worse. Go to the emergency room or urgent care right away. An asthma attack is easiest to treat in the early stages before it becomes severe.  When to seek medical advice  Get prompt medical attention if any of the following occur:  · Increased wheezing or shortness of breath  · Need to use your inhalers more often than usual without relief  · Fever of 100.4°F (38°C) or higher, or as directed by your healthcare provider  · Coughing up lots of dark-colored or bloody sputum (mucus)  · Chest pain with each breath  · Blue lips or fingernails  · Peak flow reading less than 50% of your normal best  Date Last Reviewed: 12/2/2015  © 1549-7561 [employer]. [address] [place] [address], [place] [address]. All rights reserved. This information is not intended as a substitute for professional medical care. Always follow your healthcare professional's instructions.        Make sure that you follow up with your primary care doctor in the next 2-5 days if needed .  Return to the Urgent Care if signs or symptoms change and certainly if you have worsening symptoms go to the nearest emergency  department for further evaluation.

## 2020-11-15 NOTE — LETTER
November 15, 2020      [facility]  [address] [facility] [address]  Phone: [phone]  Fax: [phone]       Patient: Shaina Mas   YOB: 1985  Date of Visit: 11/15/2020    To Whom It May Concern:    Nevaeh Mas  was at [facility] on 11/15/2020 in follow-up of her COVID-19 infection.  She may return to work on Wednesday November 18th wearing a mask, and with continued attention to physical distancing.       Sincerely,    Anusha Corea MD

## 2020-11-15 NOTE — PROGRESS NOTES
"Subjective:       Patient ID: Shaina Mas is a 35 y.o. female.    Vitals:  height is 5' 4" (1.626 m) and weight is 111.6 kg (246 lb). Her temperature is 98.1 °F (36.7 °C). Her blood pressure is 122/79 and her pulse is 85. Her respiration is 12 and oxygen saturation is 98%.     Chief Complaint: Fever    35-year-old female who was seen 11/04/2020 with a positive COVID result and accompanying symptoms.  She reports that her cough is improved although still with shortness of breath at times.  She has felt chilled, although does not have her thermometer at home.  She feels winded upon climbing stairs and thinks she might be wheezing.  She has an albuterol inhaler, although has not been using.  She is not comfortable going back to school quite yet.          Fever   Chronicity: continuous. Episode onset: 11/4/20. The problem occurs intermittently. The problem has been unchanged. Associated symptoms include coughing. Pertinent negatives include no congestion, ear pain, nausea, rash, sore throat, vomiting or wheezing. She has tried acetaminophen for the symptoms.       Constitution: Positive for chills and fatigue. Negative for sweating.   HENT: Negative for ear pain, congestion, sinus pain, sinus pressure, sore throat and voice change.    Neck: Negative for painful lymph nodes.   Eyes: Negative for eye redness.   Respiratory: Positive for cough, sputum production and shortness of breath. Negative for chest tightness, bloody sputum, COPD, stridor, wheezing and asthma.    Gastrointestinal: Negative for nausea and vomiting.   Musculoskeletal: Negative for muscle ache.   Skin: Negative for rash.   Allergic/Immunologic: Negative for seasonal allergies and asthma.   Hematologic/Lymphatic: Negative for swollen lymph nodes.       Objective:      Physical Exam   Constitutional: She is oriented to person, place, and time. She appears well-developed. She is cooperative.  Non-toxic appearance. She does not appear ill. No distress. "   HENT:   Head: Normocephalic and atraumatic.   Ears:   Right Ear: Hearing, tympanic membrane, external ear and ear canal normal.   Left Ear: Hearing, tympanic membrane, external ear and ear canal normal.   Nose: Nose normal. No mucosal edema, rhinorrhea or nasal deformity. No epistaxis. Right sinus exhibits no maxillary sinus tenderness and no frontal sinus tenderness. Left sinus exhibits no maxillary sinus tenderness and no frontal sinus tenderness.   Mouth/Throat: Uvula is midline, oropharynx is clear and moist and mucous membranes are normal. No trismus in the jaw. Normal dentition. No uvula swelling. No oropharyngeal exudate, posterior oropharyngeal edema or posterior oropharyngeal erythema.   Eyes: Conjunctivae and lids are normal. No scleral icterus.   Neck: Trachea normal, full passive range of motion without pain and phonation normal. Neck supple. No neck rigidity. No edema and no erythema present.   Cardiovascular: Normal rate, regular rhythm, normal heart sounds and normal pulses.   Pulmonary/Chest: Effort normal and breath sounds normal. No stridor. No respiratory distress. She has no decreased breath sounds. She has no wheezes. She has no rhonchi. She has no rales.   Abdominal: Normal appearance.   Musculoskeletal: Normal range of motion.         General: No deformity.   Neurological: She is alert and oriented to person, place, and time. She exhibits normal muscle tone. Coordination normal.   Skin: Skin is warm, dry, intact, not diaphoretic and not pale. Psychiatric: Her speech is normal and behavior is normal. Judgment and thought content normal.   Nursing note and vitals reviewed.        CXR:  Lungs are clear.  No focal consolidation, pleural effusion or pneumothorax.  No acute cardiopulmonary abnormality.  Assessment:       1. Bronchitis due to COVID-19 virus    2. Cough    3. Bronchospasm        Plan:     - patient reassured.  Generally doing well and improving.  I did extend her return to work  until November 18.  Discussed appropriate use of her albuterol inhaler.  She also needs to get a thermometer.    Bronchitis due to COVID-19 virus    Cough  -     X-Ray Chest PA And Lateral    Bronchospasm    Make sure that you follow up with your primary care doctor in the next 2-5 days if needed .  Return to the Urgent Care if signs or symptoms change and certainly if you have worsening symptoms go to the nearest emergency department for further evaluation.

## 2021-08-19 NOTE — TELEPHONE ENCOUNTER
----- Message from Lauren Morin DO sent at 1/31/2020  8:29 PM CST -----  Please notify pt her urine culture was negative for infection   Cold/Sinus

## 2022-01-16 ENCOUNTER — OFFICE VISIT (OUTPATIENT)
Dept: URGENT CARE | Facility: CLINIC | Age: 37
End: 2022-01-16
Payer: COMMERCIAL

## 2022-01-16 VITALS
WEIGHT: 242.5 LBS | HEIGHT: 65 IN | OXYGEN SATURATION: 97 % | TEMPERATURE: 98 F | SYSTOLIC BLOOD PRESSURE: 117 MMHG | HEART RATE: 123 BPM | BODY MASS INDEX: 40.4 KG/M2 | RESPIRATION RATE: 18 BRPM | DIASTOLIC BLOOD PRESSURE: 78 MMHG

## 2022-01-16 DIAGNOSIS — R11.2 NAUSEA AND VOMITING, INTRACTABILITY OF VOMITING NOT SPECIFIED, UNSPECIFIED VOMITING TYPE: ICD-10-CM

## 2022-01-16 DIAGNOSIS — R19.7 DIARRHEA, UNSPECIFIED TYPE: ICD-10-CM

## 2022-01-16 DIAGNOSIS — R50.9 FEVER, UNSPECIFIED FEVER CAUSE: Primary | ICD-10-CM

## 2022-01-16 LAB
CTP QC/QA: YES
CTP QC/QA: YES
POC MOLECULAR INFLUENZA A AGN: NEGATIVE
POC MOLECULAR INFLUENZA B AGN: NEGATIVE
SARS-COV-2 RDRP RESP QL NAA+PROBE: NEGATIVE

## 2022-01-16 PROCEDURE — U0002: ICD-10-PCS | Mod: QW,S$GLB,, | Performed by: PHYSICIAN ASSISTANT

## 2022-01-16 PROCEDURE — 99214 OFFICE O/P EST MOD 30 MIN: CPT | Mod: S$GLB,CS,, | Performed by: PHYSICIAN ASSISTANT

## 2022-01-16 PROCEDURE — 3074F PR MOST RECENT SYSTOLIC BLOOD PRESSURE < 130 MM HG: ICD-10-PCS | Mod: CPTII,S$GLB,, | Performed by: PHYSICIAN ASSISTANT

## 2022-01-16 PROCEDURE — 1160F PR REVIEW ALL MEDS BY PRESCRIBER/CLIN PHARMACIST DOCUMENTED: ICD-10-PCS | Mod: CPTII,S$GLB,, | Performed by: PHYSICIAN ASSISTANT

## 2022-01-16 PROCEDURE — 3078F DIAST BP <80 MM HG: CPT | Mod: CPTII,S$GLB,, | Performed by: PHYSICIAN ASSISTANT

## 2022-01-16 PROCEDURE — S0119 PR ONDANSETRON, ORAL, 4MG: ICD-10-PCS | Mod: S$GLB,,, | Performed by: PHYSICIAN ASSISTANT

## 2022-01-16 PROCEDURE — 3008F PR BODY MASS INDEX (BMI) DOCUMENTED: ICD-10-PCS | Mod: CPTII,S$GLB,, | Performed by: PHYSICIAN ASSISTANT

## 2022-01-16 PROCEDURE — U0002 COVID-19 LAB TEST NON-CDC: HCPCS | Mod: QW,S$GLB,, | Performed by: PHYSICIAN ASSISTANT

## 2022-01-16 PROCEDURE — 3078F PR MOST RECENT DIASTOLIC BLOOD PRESSURE < 80 MM HG: ICD-10-PCS | Mod: CPTII,S$GLB,, | Performed by: PHYSICIAN ASSISTANT

## 2022-01-16 PROCEDURE — 99214 PR OFFICE/OUTPT VISIT, EST, LEVL IV, 30-39 MIN: ICD-10-PCS | Mod: S$GLB,CS,, | Performed by: PHYSICIAN ASSISTANT

## 2022-01-16 PROCEDURE — 1159F MED LIST DOCD IN RCRD: CPT | Mod: CPTII,S$GLB,, | Performed by: PHYSICIAN ASSISTANT

## 2022-01-16 PROCEDURE — 3074F SYST BP LT 130 MM HG: CPT | Mod: CPTII,S$GLB,, | Performed by: PHYSICIAN ASSISTANT

## 2022-01-16 PROCEDURE — S0119 ONDANSETRON 4 MG: HCPCS | Mod: S$GLB,,, | Performed by: PHYSICIAN ASSISTANT

## 2022-01-16 PROCEDURE — 87502 INFLUENZA DNA AMP PROBE: CPT | Mod: QW,S$GLB,, | Performed by: PHYSICIAN ASSISTANT

## 2022-01-16 PROCEDURE — 3008F BODY MASS INDEX DOCD: CPT | Mod: CPTII,S$GLB,, | Performed by: PHYSICIAN ASSISTANT

## 2022-01-16 PROCEDURE — 87502 POCT INFLUENZA A/B MOLECULAR: ICD-10-PCS | Mod: QW,S$GLB,, | Performed by: PHYSICIAN ASSISTANT

## 2022-01-16 PROCEDURE — 1159F PR MEDICATION LIST DOCUMENTED IN MEDICAL RECORD: ICD-10-PCS | Mod: CPTII,S$GLB,, | Performed by: PHYSICIAN ASSISTANT

## 2022-01-16 PROCEDURE — 1160F RVW MEDS BY RX/DR IN RCRD: CPT | Mod: CPTII,S$GLB,, | Performed by: PHYSICIAN ASSISTANT

## 2022-01-16 RX ORDER — ONDANSETRON 8 MG/1
8 TABLET, ORALLY DISINTEGRATING ORAL
Status: COMPLETED | OUTPATIENT
Start: 2022-01-16 | End: 2022-01-16

## 2022-01-16 RX ORDER — ONDANSETRON 4 MG/1
8 TABLET, ORALLY DISINTEGRATING ORAL EVERY 8 HOURS PRN
Qty: 21 TABLET | Refills: 0 | Status: SHIPPED | OUTPATIENT
Start: 2022-01-16 | End: 2022-01-23

## 2022-01-16 RX ORDER — IBUPROFEN 800 MG/1
800 TABLET ORAL 3 TIMES DAILY
Qty: 30 TABLET | Refills: 0 | Status: SHIPPED | OUTPATIENT
Start: 2022-01-16 | End: 2022-01-26

## 2022-01-16 RX ORDER — DICYCLOMINE HYDROCHLORIDE 10 MG/1
10 CAPSULE ORAL 2 TIMES DAILY
Qty: 10 CAPSULE | Refills: 0 | Status: SHIPPED | OUTPATIENT
Start: 2022-01-16 | End: 2022-01-21

## 2022-01-16 RX ADMIN — ONDANSETRON 8 MG: 8 TABLET, ORALLY DISINTEGRATING ORAL at 11:01

## 2022-01-16 NOTE — PROGRESS NOTES
"Subjective:       Patient ID: Shaina Mas is a 36 y.o. female.    Vitals:  height is 5' 4.96" (1.65 m) and weight is 110 kg (242 lb 8.1 oz). Her temperature is 98.1 °F (36.7 °C). Her blood pressure is 117/78 and her pulse is 123 (abnormal). Her respiration is 18 and oxygen saturation is 97%.     Chief Complaint: Fever    36-year-old female who presents with  c o , sore throat, vomiting, upset stomach, chills, headache x 3 days.  States she got her booster 2 days ago and was feeling a little nauseous yesterday and this morning she woke up in feels very nauseated.  Has to force herself to vomit by sticking her finger down her throat.  Has had 2 bouts of liquid diarrhea.  Has chronic headache from 1 day ago that persisted to today.  Has whole body fatigue and run down feeling.  Covid vaccinated.  No known covid exposures.     Fever   This is a new problem. The current episode started in the past 7 days. Her temperature was unmeasured prior to arrival. Associated symptoms include headaches, a sore throat and vomiting. She has tried nothing for the symptoms.       Constitution: Positive for fever.   HENT: Positive for sore throat.    Gastrointestinal: Positive for vomiting.   Neurological: Positive for headaches.       Objective:      Physical Exam   Constitutional: She is oriented to person, place, and time. She appears well-developed and well-nourished. She is cooperative.  Non-toxic appearance. She does not have a sickly appearance. She does not appear ill. No distress. normalawake  HENT:   Head: Normocephalic and atraumatic.   Ears:   Right Ear: Hearing, tympanic membrane, external ear and ear canal normal.   Left Ear: Hearing, tympanic membrane, external ear and ear canal normal.   Nose: Congestion present. No mucosal edema, rhinorrhea or nasal deformity. No epistaxis. Right sinus exhibits no maxillary sinus tenderness and no frontal sinus tenderness. Left sinus exhibits no maxillary sinus tenderness and no " frontal sinus tenderness.   Mouth/Throat: Uvula is midline, oropharynx is clear and moist and mucous membranes are normal. Mucous membranes are moist. No trismus in the jaw. Normal dentition. No uvula swelling. No oropharyngeal exudate, posterior oropharyngeal edema or posterior oropharyngeal erythema. No tonsillar exudate.   Eyes: Conjunctivae and lids are normal. Pupils are equal, round, and reactive to light. No scleral icterus.      extraocular movement intact   Neck: Trachea normal and phonation normal. Neck supple. No edema present. No erythema present. No neck rigidity present.   Cardiovascular: Normal rate, regular rhythm, normal heart sounds, intact distal pulses and normal pulses.   Pulmonary/Chest: Effort normal and breath sounds normal. No stridor. No respiratory distress. She has no decreased breath sounds. She has no wheezes. She has no rhonchi. She has no rales.   Abdominal: Normal appearance and bowel sounds are normal. She exhibits no shifting dullness, no distension, no fluid wave, no abdominal bruit, no pulsatile midline mass and no mass. Soft. flat abdomenThere is no splenomegaly or hepatomegaly. There is generalized abdominal tenderness and tenderness in the left upper quadrant and left lower quadrant. There is no rebound, no guarding, no left CVA tenderness and no right CVA tenderness.   Musculoskeletal: Normal range of motion.         General: No deformity or edema. Normal range of motion.   Neurological: She is alert and oriented to person, place, and time. She has normal strength. She exhibits normal muscle tone. Coordination normal.   Skin: Skin is warm, dry, intact, not diaphoretic and not pale.   Psychiatric: She has a normal mood and affect. Her speech is normal and behavior is normal. Judgment and thought content normal. Cognition and memory  Nursing note and vitals reviewed.    Results for orders placed or performed in visit on 01/16/22   POCT COVID-19 Rapid Screening   Result Value  Ref Range    POC Rapid COVID Negative Negative     Acceptable Yes    POCT Influenza A/B MOLECULAR   Result Value Ref Range    POC Molecular Influenza A Ag Negative Negative, Not Reported    POC Molecular Influenza B Ag Negative Negative, Not Reported     Acceptable Yes     No results found.         Assessment:       1. Fever, unspecified fever cause    2. Nausea and vomiting, intractability of vomiting not specified, unspecified vomiting type    3. Diarrhea, unspecified type          Plan:         Fever, unspecified fever cause  -     POCT COVID-19 Rapid Screening  -     POCT Influenza A/B MOLECULAR  -     ibuprofen (ADVIL,MOTRIN) 800 MG tablet; Take 1 tablet (800 mg total) by mouth 3 (three) times daily. for 10 days  Dispense: 30 tablet; Refill: 0    Nausea and vomiting, intractability of vomiting not specified, unspecified vomiting type  -     ondansetron disintegrating tablet 8 mg  -     ondansetron (ZOFRAN-ODT) 4 MG TbDL; Take 2 tablets (8 mg total) by mouth every 8 (eight) hours as needed.  Dispense: 21 tablet; Refill: 0    Diarrhea, unspecified type  -     dicyclomine (BENTYL) 10 MG capsule; Take 1 capsule (10 mg total) by mouth 2 (two) times daily. for 5 days  Dispense: 10 capsule; Refill: 0               You must understand that you've received an Urgent Care treatment only and that you may be released before all your medical problems are known or treated. You, the patient, will arrange for follow up care as instructed.  Follow up with your PCP or specialty clinic as directed in the next 1-2 weeks if not improved or as needed.  You can call [phone] to schedule an appointment with the appropriate provider.  If your condition worsens we recommend that you receive another evaluation at the emergency room immediately or contact your primary medical clinics after hours call service to discuss your concerns.  Please return here or go to the Emergency Department for any concerns  "or worsening of condition.    If you were prescribed a narcotic or controlled medication, do not drive or operate heavy equipment or machinery while taking these medications.    Patient Instructions   Patient Education       Headache, Adult ED   General Information   You came to the Emergency Department (ED) today for a headache. The doctors feel it is unlikely that something serious is causing your headache and it is safe for you to recover at home.  You may be waiting on some test results. If so, the staff will contact you if there are concerning results.  What care is needed at home?   · Call your regular doctor to let them know you were in the ED. Make a follow-up appointment if you are told to.  · You can take drugs like acetaminophen, ibuprofen, or naproxen for pain as instructed, but use of these pain medicines should be limited. If you need to take pain medicines every day for headaches, call your doctor.  · If possible, lie down in a quiet, dark room.  · Make sure you eat at regular times. Do not skip meals. Drink plenty of fluids. Be sure you are getting enough sleep.  · If you have frequent headaches that interfere with your activities, you can keep a "headache diary." This might help to see if there is a pattern to your headaches. Make notes about:  ? Where your pain is on your head or neck.  ? When you have the pain and how long it lasts.  ? How your pain feels. Is it dull, sharp, burning, stabbing, or cramping?  ? What causes your pain?  ? What makes your pain better or worse?  When do I need to get emergency help?   · Call for an ambulance right away if:   ? You have a seizure.  ? You have signs of stroke like sudden:  § Numbness or weakness of the face, arm. or leg, especially on one side of the body.  § Confusion, trouble speaking, or understanding.  § Trouble seeing in one or both eyes.  § Trouble walking, dizziness, loss of balance, or coordination.  § Severe headache with no known cause.  ? You " feel extremely weak, confused, or lethargic, or you pass out.  ? You have a headache along with neck pain, neck stiffness, fever, or chills.  ? You have a headache along with a new skin rash.  ? You have significant nausea or vomiting with your headache.  When do I need to call the doctor?   · The headache lasts more than a few days or the pain gets worse or comes more often.  · You have new or worsening symptoms.  Last Reviewed Date   2020-06-16  Consumer Information Use and Disclaimer   This information is not specific medical advice and does not replace information you receive from your health care provider. This is only a brief summary of general information. It does NOT include all information about conditions, illnesses, injuries, tests, procedures, treatments, therapies, discharge instructions or life-style choices that may apply to you. You must talk with your health care provider for complete information about your health and treatment options. This information should not be used to decide whether or not to accept your health care providers advice, instructions or recommendations. Only your health care provider has the knowledge and training to provide advice that is right for you.  Copyright   Copyright © 2021 [employer] and its affiliates and/or licensors. All rights reserved.  Patient Education       Diarrhea, Adult ED   General Information   You came to the Emergency Department (ED) for diarrhea. Most doctors say you have diarrhea if you have 3 or more runny or watery stools or bowel movements in a day. The doctors feel that the risk of a serious cause for your diarrhea is low.  Diarrhea that starts all of a sudden is most often caused by a virus or bacteria. This will likely get better on its own after a few days. Other things can cause you to have loose stools like:  · Side effects from the medicines you take.  · Problems digesting your food.  · Problems with your digestive system.  You may be  waiting on some test results. The staff will contact you if there are concerning results.  What care is needed at home?   · Call your regular doctor to let them know you were in the ED. Make a follow-up appointment if you were told to.  · Drink small amounts of fluid every 15 to 30 minutes. Good fluids to drink are water, broth, and oral electrolyte solutions. Sugar-free or very low sugar sports drinks are also OK.  · Try to eat a small amount of food. Good foods to eat are potatoes, noodles, rice, oatmeal, crackers, soup, soft vegetables, and bananas. Avoid fatty foods and dairy products.  · Wash your hands often. This will help keep others healthy. It is easy to spread diarrhea from one person to the next.  · Stay home from school or work until you have stopped having diarrhea. Do not cook food for others while you have diarrhea.  · If you were given any medicines, make sure to take them as you were told.  When do I need to get emergency help?   · Return to the ED if:   ? You have signs of severe fluid loss, such as:  § No urine for more than 8 hours.  § Feel very light-headed or like you are going to pass out.  § Feel weak like you are going to fall.  ? Your stools have a large amount (more than 1 teaspoon or 5 mL) of blood in them.  ? You have very bad belly pain.  When do I need to call the doctor?   · You have more than 6 runny, watery stools in 24 hours.  · You have a fever of 101.3°F (38.5°C) or higher or chills that do not go away after a day.  · You have very bad belly pain.  · Your stools have a small amount (less than 1 teaspoon or 5 mL) of blood in them.  · You develop early signs of fluid loss, such as:  ? Your urine is very dark colored.  ? Your mouth is dry.  ? You have muscle cramps.  ? You have a lack of energy.  ? You feel light-headed when you get up.  · You have new or worsening symptoms.  Last Reviewed Date   2021-05-21  Consumer Information Use and Disclaimer   This information is not specific  medical advice and does not replace information you receive from your health care provider. This is only a brief summary of general information. It does NOT include all information about conditions, illnesses, injuries, tests, procedures, treatments, therapies, discharge instructions or life-style choices that may apply to you. You must talk with your health care provider for complete information about your health and treatment options. This information should not be used to decide whether or not to accept your health care providers advice, instructions or recommendations. Only your health care provider has the knowledge and training to provide advice that is right for you.  Copyright   Copyright © 2021 [employer] and its affiliates and/or licensors. All rights reserved.  Patient Education       Nausea and Vomiting, Adult ED   General Information   You came to the Emergency Department (ED) for nausea and vomiting. When you feel sick to your stomach, this is nausea. When you throw up, this is vomiting. Often, nausea and vomiting are caused by a virus. But they can also be caused by more serious things like an infection around the brain. The staff felt the risk of a serious cause for your nausea and vomiting is low.  If a virus is causing your nausea and vomiting, it is easy to spread from person to person. You can lower this risk by washing your hands often. Most of the time, your symptoms will go away without treatment in a few days.  You may be waiting on some test results. The staff will contact you if there are concerning results.  What care is needed at home?   · Call your regular doctor to let them know you were in the ED. Make a follow-up appointment if you were told to.  · Drink small amounts of fluid every 15 to 30 minutes. Good fluids to drink are water, broth, and oral electrolyte solutions. Sugar-free or very low sugar sports drinks are also OK.  · Once you feel you can eat, start with crackers,  toast, or cereal. Then eat small amounts of food more often.  · Wash your hands often. This will help keep others healthy.  · Avoid alcohol and caffeine.  · If you have diabetes, check your blood sugar more often than usual. Being sick can affect your blood sugar levels.  When do I need to get emergency help?   · Call for an ambulance right away if:   ? You have vomiting along with a fever and severe headache or stiff neck.  ? You have vomiting along with severe chest or belly pain or trouble breathing.  ? You are vomiting large amounts of blood (more than 1 teaspoon or 5 mL).  · Return to the ED if:   ? You have signs of severe fluid loss, such as:  § No urine for more than 8 hours.  § Feel very light-headed or like you are going to pass out.  § Feel weak like you are going to fall.  § Feel like your heart is beating very fast.  ? You feel extremely weak, like you are going to pass out.  ? You are vomiting multiple times every hour.  When do I need to call the doctor?   · You develop early signs of fluid loss, such as:  ? Dark-colored urine.  ? Dry mouth.  ? Muscle cramps.  ? Lack of energy.  ? Feeling light-headed when you get up.  · You have a small amount of blood (less than 1 teaspoon or 5 mL) in your vomit or bowel movements.  · You throw up something that looks like coffee grounds.  · You have a bowel movement that is black and looks like tar.  · You are not able to keep fluids down.  · You have a fever of 100.4º F (38°C) or higher or chills that do not go away after a day.  · You have new or worsening symptoms.  Last Reviewed Date   2021-05-21  Consumer Information Use and Disclaimer   This information is not specific medical advice and does not replace information you receive from your health care provider. This is only a brief summary of general information. It does NOT include all information about conditions, illnesses, injuries, tests, procedures, treatments, therapies, discharge instructions or  life-style choices that may apply to you. You must talk with your health care provider for complete information about your health and treatment options. This information should not be used to decide whether or not to accept your health care providers advice, instructions or recommendations. Only your health care provider has the knowledge and training to provide advice that is right for you.  Copyright   Copyright © 2021 [employer] and its affiliates and/or licensors. All rights reserved.          MARKUS Valles

## 2022-01-16 NOTE — LETTER
January 16, 2022      [facility]  [address] [place] [address]  Phone: [phone]  Fax: [phone]       Patient: Shaina Mas   YOB: 1985  Date of Visit: 01/16/2022    To Whom It May Concern:    Nevaeh Mas  was at [facility] on 01/16/2022. The patient may return to work/school on 01/19/2022 with no restrictions. If you have any questions or concerns, or if I can be of further assistance, please do not hesitate to contact me.    Sincerely,    Conrad Gann Jr., PA

## 2022-01-16 NOTE — PATIENT INSTRUCTIONS
"Patient Education       Headache, Adult ED   General Information   You came to the Emergency Department (ED) today for a headache. The doctors feel it is unlikely that something serious is causing your headache and it is safe for you to recover at home.  You may be waiting on some test results. If so, the staff will contact you if there are concerning results.  What care is needed at home?   · Call your regular doctor to let them know you were in the ED. Make a follow-up appointment if you are told to.  · You can take drugs like acetaminophen, ibuprofen, or naproxen for pain as instructed, but use of these pain medicines should be limited. If you need to take pain medicines every day for headaches, call your doctor.  · If possible, lie down in a quiet, dark room.  · Make sure you eat at regular times. Do not skip meals. Drink plenty of fluids. Be sure you are getting enough sleep.  · If you have frequent headaches that interfere with your activities, you can keep a "headache diary." This might help to see if there is a pattern to your headaches. Make notes about:  ? Where your pain is on your head or neck.  ? When you have the pain and how long it lasts.  ? How your pain feels. Is it dull, sharp, burning, stabbing, or cramping?  ? What causes your pain?  ? What makes your pain better or worse?  When do I need to get emergency help?   · Call for an ambulance right away if:   ? You have a seizure.  ? You have signs of stroke like sudden:  § Numbness or weakness of the face, arm. or leg, especially on one side of the body.  § Confusion, trouble speaking, or understanding.  § Trouble seeing in one or both eyes.  § Trouble walking, dizziness, loss of balance, or coordination.  § Severe headache with no known cause.  ? You feel extremely weak, confused, or lethargic, or you pass out.  ? You have a headache along with neck pain, neck stiffness, fever, or chills.  ? You have a headache along with a new skin rash.  ? You " have significant nausea or vomiting with your headache.  When do I need to call the doctor?   · The headache lasts more than a few days or the pain gets worse or comes more often.  · You have new or worsening symptoms.  Last Reviewed Date   2020-06-16  Consumer Information Use and Disclaimer   This information is not specific medical advice and does not replace information you receive from your health care provider. This is only a brief summary of general information. It does NOT include all information about conditions, illnesses, injuries, tests, procedures, treatments, therapies, discharge instructions or life-style choices that may apply to you. You must talk with your health care provider for complete information about your health and treatment options. This information should not be used to decide whether or not to accept your health care providers advice, instructions or recommendations. Only your health care provider has the knowledge and training to provide advice that is right for you.  Copyright   Copyright © 2021 [employer] and its affiliates and/or licensors. All rights reserved.  Patient Education       Diarrhea, Adult ED   General Information   You came to the Emergency Department (ED) for diarrhea. Most doctors say you have diarrhea if you have 3 or more runny or watery stools or bowel movements in a day. The doctors feel that the risk of a serious cause for your diarrhea is low.  Diarrhea that starts all of a sudden is most often caused by a virus or bacteria. This will likely get better on its own after a few days. Other things can cause you to have loose stools like:  · Side effects from the medicines you take.  · Problems digesting your food.  · Problems with your digestive system.  You may be waiting on some test results. The staff will contact you if there are concerning results.  What care is needed at home?   · Call your regular doctor to let them know you were in the ED. Make a  follow-up appointment if you were told to.  · Drink small amounts of fluid every 15 to 30 minutes. Good fluids to drink are water, broth, and oral electrolyte solutions. Sugar-free or very low sugar sports drinks are also OK.  · Try to eat a small amount of food. Good foods to eat are potatoes, noodles, rice, oatmeal, crackers, soup, soft vegetables, and bananas. Avoid fatty foods and dairy products.  · Wash your hands often. This will help keep others healthy. It is easy to spread diarrhea from one person to the next.  · Stay home from school or work until you have stopped having diarrhea. Do not cook food for others while you have diarrhea.  · If you were given any medicines, make sure to take them as you were told.  When do I need to get emergency help?   · Return to the ED if:   ? You have signs of severe fluid loss, such as:  § No urine for more than 8 hours.  § Feel very light-headed or like you are going to pass out.  § Feel weak like you are going to fall.  ? Your stools have a large amount (more than 1 teaspoon or 5 mL) of blood in them.  ? You have very bad belly pain.  When do I need to call the doctor?   · You have more than 6 runny, watery stools in 24 hours.  · You have a fever of 101.3°F (38.5°C) or higher or chills that do not go away after a day.  · You have very bad belly pain.  · Your stools have a small amount (less than 1 teaspoon or 5 mL) of blood in them.  · You develop early signs of fluid loss, such as:  ? Your urine is very dark colored.  ? Your mouth is dry.  ? You have muscle cramps.  ? You have a lack of energy.  ? You feel light-headed when you get up.  · You have new or worsening symptoms.  Last Reviewed Date   2021-05-21  Consumer Information Use and Disclaimer   This information is not specific medical advice and does not replace information you receive from your health care provider. This is only a brief summary of general information. It does NOT include all information about  conditions, illnesses, injuries, tests, procedures, treatments, therapies, discharge instructions or life-style choices that may apply to you. You must talk with your health care provider for complete information about your health and treatment options. This information should not be used to decide whether or not to accept your health care providers advice, instructions or recommendations. Only your health care provider has the knowledge and training to provide advice that is right for you.  Copyright   Copyright © 2021 [employer] and its affiliates and/or licensors. All rights reserved.  Patient Education       Nausea and Vomiting, Adult ED   General Information   You came to the Emergency Department (ED) for nausea and vomiting. When you feel sick to your stomach, this is nausea. When you throw up, this is vomiting. Often, nausea and vomiting are caused by a virus. But they can also be caused by more serious things like an infection around the brain. The staff felt the risk of a serious cause for your nausea and vomiting is low.  If a virus is causing your nausea and vomiting, it is easy to spread from person to person. You can lower this risk by washing your hands often. Most of the time, your symptoms will go away without treatment in a few days.  You may be waiting on some test results. The staff will contact you if there are concerning results.  What care is needed at home?   · Call your regular doctor to let them know you were in the ED. Make a follow-up appointment if you were told to.  · Drink small amounts of fluid every 15 to 30 minutes. Good fluids to drink are water, broth, and oral electrolyte solutions. Sugar-free or very low sugar sports drinks are also OK.  · Once you feel you can eat, start with crackers, toast, or cereal. Then eat small amounts of food more often.  · Wash your hands often. This will help keep others healthy.  · Avoid alcohol and caffeine.  · If you have diabetes, check your  blood sugar more often than usual. Being sick can affect your blood sugar levels.  When do I need to get emergency help?   · Call for an ambulance right away if:   ? You have vomiting along with a fever and severe headache or stiff neck.  ? You have vomiting along with severe chest or belly pain or trouble breathing.  ? You are vomiting large amounts of blood (more than 1 teaspoon or 5 mL).  · Return to the ED if:   ? You have signs of severe fluid loss, such as:  § No urine for more than 8 hours.  § Feel very light-headed or like you are going to pass out.  § Feel weak like you are going to fall.  § Feel like your heart is beating very fast.  ? You feel extremely weak, like you are going to pass out.  ? You are vomiting multiple times every hour.  When do I need to call the doctor?   · You develop early signs of fluid loss, such as:  ? Dark-colored urine.  ? Dry mouth.  ? Muscle cramps.  ? Lack of energy.  ? Feeling light-headed when you get up.  · You have a small amount of blood (less than 1 teaspoon or 5 mL) in your vomit or bowel movements.  · You throw up something that looks like coffee grounds.  · You have a bowel movement that is black and looks like tar.  · You are not able to keep fluids down.  · You have a fever of 100.4º F (38°C) or higher or chills that do not go away after a day.  · You have new or worsening symptoms.  Last Reviewed Date   2021-05-21  Consumer Information Use and Disclaimer   This information is not specific medical advice and does not replace information you receive from your health care provider. This is only a brief summary of general information. It does NOT include all information about conditions, illnesses, injuries, tests, procedures, treatments, therapies, discharge instructions or life-style choices that may apply to you. You must talk with your health care provider for complete information about your health and treatment options. This information should not be used to  decide whether or not to accept your health care providers advice, instructions or recommendations. Only your health care provider has the knowledge and training to provide advice that is right for you.  Copyright   Copyright © 2021 [employer] and its affiliates and/or licensors. All rights reserved.

## 2022-05-03 ENCOUNTER — PATIENT MESSAGE (OUTPATIENT)
Dept: RESEARCH | Facility: HOSPITAL | Age: 37
End: 2022-05-03
Payer: COMMERCIAL

## 2022-06-07 ENCOUNTER — OFFICE VISIT (OUTPATIENT)
Dept: INTERNAL MEDICINE | Facility: CLINIC | Age: 37
End: 2022-06-07
Payer: COMMERCIAL

## 2022-06-07 VITALS
WEIGHT: 279.31 LBS | OXYGEN SATURATION: 97 % | HEART RATE: 110 BPM | SYSTOLIC BLOOD PRESSURE: 134 MMHG | HEIGHT: 66 IN | DIASTOLIC BLOOD PRESSURE: 82 MMHG | BODY MASS INDEX: 44.89 KG/M2

## 2022-06-07 DIAGNOSIS — J06.9 ACUTE URI: ICD-10-CM

## 2022-06-07 DIAGNOSIS — H65.02 ACUTE SEROUS OTITIS MEDIA OF LEFT EAR, RECURRENCE NOT SPECIFIED: Primary | ICD-10-CM

## 2022-06-07 LAB
CTP QC/QA: YES
SARS-COV-2 RDRP RESP QL NAA+PROBE: NEGATIVE

## 2022-06-07 PROCEDURE — 1160F RVW MEDS BY RX/DR IN RCRD: CPT | Mod: CPTII,S$GLB,, | Performed by: INTERNAL MEDICINE

## 2022-06-07 PROCEDURE — 1160F PR REVIEW ALL MEDS BY PRESCRIBER/CLIN PHARMACIST DOCUMENTED: ICD-10-PCS | Mod: CPTII,S$GLB,, | Performed by: INTERNAL MEDICINE

## 2022-06-07 PROCEDURE — 1159F PR MEDICATION LIST DOCUMENTED IN MEDICAL RECORD: ICD-10-PCS | Mod: CPTII,S$GLB,, | Performed by: INTERNAL MEDICINE

## 2022-06-07 PROCEDURE — 99202 PR OFFICE/OUTPT VISIT, NEW, LEVL II, 15-29 MIN: ICD-10-PCS | Mod: S$GLB,,, | Performed by: INTERNAL MEDICINE

## 2022-06-07 PROCEDURE — U0002: ICD-10-PCS | Mod: QW,S$GLB,, | Performed by: INTERNAL MEDICINE

## 2022-06-07 PROCEDURE — 3008F PR BODY MASS INDEX (BMI) DOCUMENTED: ICD-10-PCS | Mod: CPTII,S$GLB,, | Performed by: INTERNAL MEDICINE

## 2022-06-07 PROCEDURE — 3075F PR MOST RECENT SYSTOLIC BLOOD PRESS GE 130-139MM HG: ICD-10-PCS | Mod: CPTII,S$GLB,, | Performed by: INTERNAL MEDICINE

## 2022-06-07 PROCEDURE — 3075F SYST BP GE 130 - 139MM HG: CPT | Mod: CPTII,S$GLB,, | Performed by: INTERNAL MEDICINE

## 2022-06-07 PROCEDURE — 99202 OFFICE O/P NEW SF 15 MIN: CPT | Mod: S$GLB,,, | Performed by: INTERNAL MEDICINE

## 2022-06-07 PROCEDURE — 3079F PR MOST RECENT DIASTOLIC BLOOD PRESSURE 80-89 MM HG: ICD-10-PCS | Mod: CPTII,S$GLB,, | Performed by: INTERNAL MEDICINE

## 2022-06-07 PROCEDURE — 99999 PR PBB SHADOW E&M-EST. PATIENT-LVL IV: ICD-10-PCS | Mod: PBBFAC,,, | Performed by: INTERNAL MEDICINE

## 2022-06-07 PROCEDURE — 3008F BODY MASS INDEX DOCD: CPT | Mod: CPTII,S$GLB,, | Performed by: INTERNAL MEDICINE

## 2022-06-07 PROCEDURE — 3079F DIAST BP 80-89 MM HG: CPT | Mod: CPTII,S$GLB,, | Performed by: INTERNAL MEDICINE

## 2022-06-07 PROCEDURE — U0002 COVID-19 LAB TEST NON-CDC: HCPCS | Mod: QW,S$GLB,, | Performed by: INTERNAL MEDICINE

## 2022-06-07 PROCEDURE — 1159F MED LIST DOCD IN RCRD: CPT | Mod: CPTII,S$GLB,, | Performed by: INTERNAL MEDICINE

## 2022-06-07 PROCEDURE — 99999 PR PBB SHADOW E&M-EST. PATIENT-LVL IV: CPT | Mod: PBBFAC,,, | Performed by: INTERNAL MEDICINE

## 2022-06-07 RX ORDER — AMOXICILLIN AND CLAVULANATE POTASSIUM 875; 125 MG/1; MG/1
1 TABLET, FILM COATED ORAL EVERY 12 HOURS
Qty: 20 TABLET | Refills: 0 | Status: SHIPPED | OUTPATIENT
Start: 2022-06-07 | End: 2022-08-07 | Stop reason: ALTCHOICE

## 2022-06-07 RX ORDER — AMOXICILLIN AND CLAVULANATE POTASSIUM 875; 125 MG/1; MG/1
1 TABLET, FILM COATED ORAL EVERY 12 HOURS
Qty: 20 TABLET | Refills: 0 | Status: SHIPPED | OUTPATIENT
Start: 2022-06-07 | End: 2022-06-07

## 2022-06-07 NOTE — PROGRESS NOTES
Subjective:       Patient ID: Shaina Mas is a 36 y.o. female.    Chief Complaint: Cough, Sore Throat, and Otalgia      Pt and problem are new to me.    Shaina Mas is 36 y.o. female who presents for left ear pain 10/10 with decreased hearing X 1 week.  Pt has not had COVID 19 test.  Pt is elementary .  Pt took dayquil, nyquil, and other OTC cold medicine with decrease but not resolution of symptoms.          Review of Systems   Constitutional: Negative for chills and fever.   HENT: Positive for postnasal drip, sinus pressure/congestion and sore throat. Negative for rhinorrhea.    Respiratory: Positive for cough (with intermittent production green mucus). Negative for shortness of breath.    Gastrointestinal: Positive for diarrhea. Negative for abdominal pain, constipation, nausea and vomiting.   Neurological: Positive for headaches.         Objective:      Physical Exam  Vitals reviewed.   Constitutional:       General: She is awake.      Appearance: Normal appearance.   HENT:      Head: Normocephalic and atraumatic.      Right Ear: Tympanic membrane, ear canal and external ear normal.      Left Ear: Ear canal and external ear normal. Tympanic membrane is erythematous and bulging. Tympanic membrane is not retracted. Tympanic membrane has decreased mobility.      Nose:      Right Turbinates: Swollen.      Left Turbinates: Swollen.      Right Sinus: No maxillary sinus tenderness or frontal sinus tenderness.      Left Sinus: No maxillary sinus tenderness or frontal sinus tenderness.      Mouth/Throat:      Mouth: Mucous membranes are moist.      Pharynx: Oropharynx is clear. Posterior oropharyngeal erythema present.   Eyes:      Extraocular Movements: Extraocular movements intact.      Conjunctiva/sclera: Conjunctivae normal.      Pupils: Pupils are equal, round, and reactive to light.   Cardiovascular:      Rate and Rhythm: Normal rate and regular rhythm.      Heart sounds: No murmur heard.    No  friction rub. No gallop.   Pulmonary:      Effort: Pulmonary effort is normal.      Breath sounds: Normal breath sounds.   Abdominal:      General: Bowel sounds are normal. There is no distension.      Tenderness: There is no abdominal tenderness. There is no guarding or rebound.   Musculoskeletal:      Right lower leg: No edema.      Left lower leg: No edema.   Lymphadenopathy:      Cervical: No cervical adenopathy.   Neurological:      Mental Status: She is alert and oriented to person, place, and time.   Psychiatric:         Mood and Affect: Mood normal.         Behavior: Behavior is cooperative.         Assessment:       1. Acute serous otitis media of left ear, recurrence not specified    2. Acute URI        Plan:     Pt with acute URI.  COVID 19 testing negative.  Recommend mucinex D by mouth 2x/day for 10 days.  Pt also has left ear acute serous otitis media.  Will tx with Augmentin 875mg/125mg po BID X 10 days.  Pt counseled no swimming or immersing ear in water until abx completed.  Patient was advised to follow up if symptom are not improving or worsened.      Shaina was seen today for cough, sore throat and otalgia.    Diagnoses and all orders for this visit:    Acute serous otitis media of left ear, recurrence not specified  -     amoxicillin-clavulanate 875-125mg (AUGMENTIN) 875-125 mg per tablet; Take 1 tablet by mouth every 12 (twelve) hours.    Acute URI  -     POCT COVID-19 Rapid Screening    Other orders  -     Discontinue: amoxicillin-clavulanate 875-125mg (AUGMENTIN) 875-125 mg per tablet; Take 1 tablet by mouth every 12 (twelve) hours.

## 2022-06-14 ENCOUNTER — OFFICE VISIT (OUTPATIENT)
Dept: OTOLARYNGOLOGY | Facility: CLINIC | Age: 37
End: 2022-06-14
Payer: COMMERCIAL

## 2022-06-14 VITALS — BODY MASS INDEX: 45.6 KG/M2 | HEIGHT: 66 IN | TEMPERATURE: 99 F | WEIGHT: 283.75 LBS

## 2022-06-14 DIAGNOSIS — H65.02 NON-RECURRENT ACUTE SEROUS OTITIS MEDIA OF LEFT EAR: ICD-10-CM

## 2022-06-14 DIAGNOSIS — H69.92 DYSFUNCTION OF LEFT EUSTACHIAN TUBE: Primary | ICD-10-CM

## 2022-06-14 PROCEDURE — 99203 OFFICE O/P NEW LOW 30 MIN: CPT | Mod: S$GLB,,, | Performed by: PHYSICIAN ASSISTANT

## 2022-06-14 PROCEDURE — 99999 PR PBB SHADOW E&M-EST. PATIENT-LVL III: CPT | Mod: PBBFAC,,, | Performed by: PHYSICIAN ASSISTANT

## 2022-06-14 PROCEDURE — 1159F PR MEDICATION LIST DOCUMENTED IN MEDICAL RECORD: ICD-10-PCS | Mod: CPTII,S$GLB,, | Performed by: PHYSICIAN ASSISTANT

## 2022-06-14 PROCEDURE — 1159F MED LIST DOCD IN RCRD: CPT | Mod: CPTII,S$GLB,, | Performed by: PHYSICIAN ASSISTANT

## 2022-06-14 PROCEDURE — 1160F PR REVIEW ALL MEDS BY PRESCRIBER/CLIN PHARMACIST DOCUMENTED: ICD-10-PCS | Mod: CPTII,S$GLB,, | Performed by: PHYSICIAN ASSISTANT

## 2022-06-14 PROCEDURE — 99999 PR PBB SHADOW E&M-EST. PATIENT-LVL III: ICD-10-PCS | Mod: PBBFAC,,, | Performed by: PHYSICIAN ASSISTANT

## 2022-06-14 PROCEDURE — 3008F PR BODY MASS INDEX (BMI) DOCUMENTED: ICD-10-PCS | Mod: CPTII,S$GLB,, | Performed by: PHYSICIAN ASSISTANT

## 2022-06-14 PROCEDURE — 1160F RVW MEDS BY RX/DR IN RCRD: CPT | Mod: CPTII,S$GLB,, | Performed by: PHYSICIAN ASSISTANT

## 2022-06-14 PROCEDURE — 3008F BODY MASS INDEX DOCD: CPT | Mod: CPTII,S$GLB,, | Performed by: PHYSICIAN ASSISTANT

## 2022-06-14 PROCEDURE — 99203 PR OFFICE/OUTPT VISIT, NEW, LEVL III, 30-44 MIN: ICD-10-PCS | Mod: S$GLB,,, | Performed by: PHYSICIAN ASSISTANT

## 2022-06-14 RX ORDER — FLUTICASONE PROPIONATE 50 MCG
1 SPRAY, SUSPENSION (ML) NASAL 2 TIMES DAILY
Qty: 16 G | Refills: 3 | Status: SHIPPED | OUTPATIENT
Start: 2022-06-14 | End: 2023-11-02 | Stop reason: ALTCHOICE

## 2022-06-14 NOTE — PATIENT INSTRUCTIONS
Pop your ears (auto insufflate) 4-6 times a day for the next 4 weeks.    Use nasal saline to clean out nose. Then use flonase 1 puff to each nostril twice daily for 8 weeks. Take claritin once daily for 8 weeks.     Follow up with ENT in 8 weeks to recheck left middle ear effusion.

## 2022-06-14 NOTE — PROGRESS NOTES
[facility]    Subjective:      Patient: Shaina Mas Patient PCP: Primary Doctor No         :  1985     Sex:  female      MRN:  [id number]          Date of Visit: 2022      Chief Complaint: Hearing loss    Patient ID: Shaina Mas is a 36 y.o. female who was self-referred for hearing loss. Pt was seen by IM 2022 for cough, sore throat and otalgia. Pt was diagnosed with acute URI. Pt's covid19 testing was negative. Pt was prescribed augmentin 875mg BID x 10 days at IM visit for acute left serous OM. Pt presents today in office and states she has had left sided hearing loss for around 2 weeks. Pt states that augmentin helped with the left pain, but not the hearing loss. Pt states that she has left sided aural pressure. Pt endorses occasional buzzing tinnitus of left ear when yawning that is non-pulsatile. Pt denies prior history of hearing loss before ear infection. Pt denies fluctuations in hearing or vertigo.There is not a prior history of ear surgery. There is not a prior history of ear infections before this most recent ear infection.  She denies a history of significant noise exposure. She has not had a hearing test recently.     Review of Systems   Constitutional: Negative for chills and fever.   HENT: Positive for ear pain, hearing loss (left x 2 weeks) and tinnitus (left). Negative for ear discharge.         Left aural pressure   Neurological: Negative for dizziness.      Past Medical History  She has no past medical history on file.    Family History  Her family history includes No Known Problems in her father and mother.    History reviewed. No pertinent surgical history.  Social History     Tobacco Use    Smoking status: Current Every Day Smoker     Types: Cigarettes    Smokeless tobacco: Never Used   Substance and Sexual Activity    Alcohol use: Yes    Drug use: Not on file    Sexual activity: Not on file     Medications  She has a current medication list which includes the  following prescription(s): albuterol, amoxicillin-clavulanate 875-125mg, benzonatate, and fluticasone propionate.    Review of patient's allergies indicates:  No Known Allergies  All medications, allergies, and past history have been reviewed.    Objective:      Vitals:  Vitals - 1 value per visit 6/7/2022 6/14/2022 6/14/2022   SYSTOLIC 134 - -   DIASTOLIC 82 - -   Pulse 110 - -   Temp - - 98.6   Resp - - -   SPO2 97 - -   Weight (lb) 279.32 - 283.73   Weight (kg) 126.7 - 128.7   Height 66 - 66   BMI (Calculated) 45.1 - 45.8   VISIT REPORT - - -   Pain Score  - 4 -       Body surface area is 2.45 meters squared.    Physical Exam  Constitutional:       General: She is not in acute distress.     Appearance: Normal appearance. She is not ill-appearing.   HENT:      Head: Normocephalic and atraumatic.      Right Ear: Tympanic membrane, ear canal and external ear normal. Tympanic membrane is not scarred, perforated, erythematous or bulging.      Left Ear: Ear canal and external ear normal. A middle ear effusion (serous) is present. Tympanic membrane is not scarred, perforated, erythematous or bulging.      Mouth/Throat:      Lips: Pink. No lesions.      Mouth: Mucous membranes are moist. No oral lesions.      Tongue: No lesions.      Palate: No lesions.      Pharynx: Oropharynx is clear. Uvula midline. No pharyngeal swelling, oropharyngeal exudate, posterior oropharyngeal erythema or uvula swelling.   Eyes:      General:         Right eye: No discharge.         Left eye: No discharge.      Extraocular Movements: Extraocular movements intact.      Conjunctiva/sclera: Conjunctivae normal.   Pulmonary:      Effort: Pulmonary effort is normal.   Neurological:      General: No focal deficit present.      Mental Status: She is alert and oriented to person, place, and time. Mental status is at baseline.   Psychiatric:         Mood and Affect: Mood normal.         Behavior: Behavior normal.         Thought Content: Thought  content normal.         Judgment: Judgment normal.       All lab results and imaging results have been reviewed.    Assessment:        ICD-10-CM ICD-9-CM   1. Dysfunction of left eustachian tube  H69.82 381.81   2. Non-recurrent acute serous otitis media of left ear  H65.02 381.01            Plan:      Dysfunction of left eustachian tube with left serous OM  -     Pt just recently began to get over viral URI. Pt advised about how viral infections can lead to ETD and how that can lead to middle ear effusion. Pt advised that she has no purulence noted in left middle ear today in office. She has a serous effusion in left middle ear. Pt does not have bulging of TM. Pt advised to pop her ears (auto insufflate) 4-6 times a day for the next 4 weeks to help drain middle ear effusion. Pt advised to use nasal saline to clean out nose. Then use flonase 1 puff to each nostril twice daily for 8 weeks. Take claritin once daily for 8 weeks. Pt advised to follow up with ENT in 8 weeks to recheck left middle ear effusion. Pt advised that should effusion last for 3 months or greater, then she would likely need PE tube placement. Pt advised that since this is a recent serous OM likely secondary to ETD from recent viral infection that medication management can clear middle ear effusion, but if it does not, then candidacy for ear tubes would be considered after 3 months or greater if she has residual middle ear effusion not cleared with medication management. Pt may follow up sooner if she has worsening or persistent symptoms or ENT questions or concerns.

## 2022-06-16 ENCOUNTER — OFFICE VISIT (OUTPATIENT)
Dept: OBSTETRICS AND GYNECOLOGY | Facility: CLINIC | Age: 37
End: 2022-06-16
Payer: COMMERCIAL

## 2022-06-16 VITALS
BODY MASS INDEX: 45.35 KG/M2 | WEIGHT: 282.19 LBS | SYSTOLIC BLOOD PRESSURE: 112 MMHG | HEIGHT: 66 IN | DIASTOLIC BLOOD PRESSURE: 70 MMHG

## 2022-06-16 DIAGNOSIS — Z01.419 ROUTINE GYNECOLOGICAL EXAMINATION: Primary | ICD-10-CM

## 2022-06-16 DIAGNOSIS — Z30.09 BIRTH CONTROL COUNSELING: ICD-10-CM

## 2022-06-16 PROCEDURE — 3078F DIAST BP <80 MM HG: CPT | Mod: CPTII,S$GLB,, | Performed by: OBSTETRICS & GYNECOLOGY

## 2022-06-16 PROCEDURE — 1160F RVW MEDS BY RX/DR IN RCRD: CPT | Mod: CPTII,S$GLB,, | Performed by: OBSTETRICS & GYNECOLOGY

## 2022-06-16 PROCEDURE — 3078F PR MOST RECENT DIASTOLIC BLOOD PRESSURE < 80 MM HG: ICD-10-PCS | Mod: CPTII,S$GLB,, | Performed by: OBSTETRICS & GYNECOLOGY

## 2022-06-16 PROCEDURE — 99999 PR PBB SHADOW E&M-EST. PATIENT-LVL III: CPT | Mod: PBBFAC,,, | Performed by: OBSTETRICS & GYNECOLOGY

## 2022-06-16 PROCEDURE — 1159F MED LIST DOCD IN RCRD: CPT | Mod: CPTII,S$GLB,, | Performed by: OBSTETRICS & GYNECOLOGY

## 2022-06-16 PROCEDURE — 99999 PR PBB SHADOW E&M-EST. PATIENT-LVL III: ICD-10-PCS | Mod: PBBFAC,,, | Performed by: OBSTETRICS & GYNECOLOGY

## 2022-06-16 PROCEDURE — 88175 CYTOPATH C/V AUTO FLUID REDO: CPT | Performed by: OBSTETRICS & GYNECOLOGY

## 2022-06-16 PROCEDURE — 3074F SYST BP LT 130 MM HG: CPT | Mod: CPTII,S$GLB,, | Performed by: OBSTETRICS & GYNECOLOGY

## 2022-06-16 PROCEDURE — 99385 PREV VISIT NEW AGE 18-39: CPT | Mod: S$GLB,,, | Performed by: OBSTETRICS & GYNECOLOGY

## 2022-06-16 PROCEDURE — 3008F BODY MASS INDEX DOCD: CPT | Mod: CPTII,S$GLB,, | Performed by: OBSTETRICS & GYNECOLOGY

## 2022-06-16 PROCEDURE — 1159F PR MEDICATION LIST DOCUMENTED IN MEDICAL RECORD: ICD-10-PCS | Mod: CPTII,S$GLB,, | Performed by: OBSTETRICS & GYNECOLOGY

## 2022-06-16 PROCEDURE — 3008F PR BODY MASS INDEX (BMI) DOCUMENTED: ICD-10-PCS | Mod: CPTII,S$GLB,, | Performed by: OBSTETRICS & GYNECOLOGY

## 2022-06-16 PROCEDURE — 3074F PR MOST RECENT SYSTOLIC BLOOD PRESSURE < 130 MM HG: ICD-10-PCS | Mod: CPTII,S$GLB,, | Performed by: OBSTETRICS & GYNECOLOGY

## 2022-06-16 PROCEDURE — 99385 PR PREVENTIVE VISIT,NEW,18-39: ICD-10-PCS | Mod: S$GLB,,, | Performed by: OBSTETRICS & GYNECOLOGY

## 2022-06-16 PROCEDURE — 1160F PR REVIEW ALL MEDS BY PRESCRIBER/CLIN PHARMACIST DOCUMENTED: ICD-10-PCS | Mod: CPTII,S$GLB,, | Performed by: OBSTETRICS & GYNECOLOGY

## 2022-06-16 RX ORDER — NORELGESTROMIN AND ETHINYL ESTRADIOL 35; 150 UG/MG; UG/MG
1 PATCH TRANSDERMAL WEEKLY
Qty: 3 PATCH | Refills: 12 | Status: SHIPPED | OUTPATIENT
Start: 2022-06-16 | End: 2022-08-29 | Stop reason: SDUPTHER

## 2022-06-16 NOTE — PROGRESS NOTES
PT HERE FOR ANNUAL.  HAS REGULAR MENSES AND WANTS PREGNANCY IN THE FUTURE.  NEEDS CONTRACEPTION.    ROS:  GENERAL: No fever, chills, fatigability or weight loss.  VULVAR: No pain, no lesions and no itching.  VAGINAL: No relaxation, no itching, no discharge, no abnormal bleeding and no lesions.  ABDOMEN: No abdominal pain. Denies nausea. Denies vomiting. No diarrhea. No constipation  BREAST: Denies pain. No lumps. No discharge.  URINARY: No incontinence, no nocturia, no frequency and no dysuria.  CARDIOVASCULAR: No chest pain. No shortness of breath. No leg cramps.  NEUROLOGICAL: No headaches. No vision changes.  The remainder of the review of systems was negative.    PE:  General Appearance: obese And Well developed. Well nourished. In no acute distress.  Vulva: Lesions: No.  Urethral Meatus: Normal size. Normal location. No lesions. No prolapse.  Urethra: No masses. No tenderness. No prolapse. No scarring.  Bladder: No masses. No tenderness.  Vagina: Mucosa NI:yes discharge no, atrophy no, cystocele no or rectocele no.  Cervix: Lesion: no  Stenotic: no Cervical motion tenderness: no  Uterus: Uterus size: 6 weeks. Support good. Uterus size: Normal  Adnexa: Masses: No Tenderness: No CDS Nodularity: No  Abdomen: obese No masses. No tenderness.  Breasts: No bilateral masses. No bilateral discharge. No bilateral tenderness. No bilateral fibrocystic changes.  Neck: No thyroid enlargement. No thyroid masses.  Skin: Rashes: No      PROCEDURES:    PLAN:     DIAGNOSIS:  1. Routine gynecological examination    2. Birth control counseling        MEDICATIONS & ORDERS:  Orders Placed This Encounter    Liquid-Based Pap Smear, Screening    norelgestromin-ethinyl estradiol (ORTHO EVRA) 150-35 mcg/24 hr       Patient was counseled today on the new ACS guidelines for cervical cytology screening as well as the current recommendations for breast cancer screening. She was counseled to follow up with her PCP for other routine health  maintenance. Counseling session lasted approximately 10 minutes, and all her questions were answered.     D/W PT ON TYPES OF RELIABLE BIRTH CONTROL AND RISKS AND BENEFITS  The use of hormonal contraception has been fully discussed with the patient. We discussed all options including OCPs, transdermal patches, vaginal ring, Depo Provera injections, Implanon, and IUD. Warnings about anticipated minor side effects such as breakthrough spotting, nausea, breast tenderness, weight changes, acne, headaches, etc were given. She has been told of the more serious potential side effects such as MI, stroke, and deep vein thrombosis, all of which are very unlikely. She has been asked to report any signs of such serious problems immediately. The need for additional protection, such as a condom, to prevent exposure to sexually transmitted diseases has also been discussed- the patient has been clearly reminded that no hormonal contraceptive method can protect her against diseases such as HIV and others. She understands and wishes to take the medication as prescribed. She wishes to begin oral contraceptives (estrogen/progesterone)    I ENCOURAGE HER TO CONSIDER IUD DUE TO UPCOMING 36 YO AND SMOKING    FOLLOW-UP: With me ANNUAL OR ABEL St Jr, MD, FACOG

## 2022-06-23 LAB
FINAL PATHOLOGIC DIAGNOSIS: NORMAL
Lab: NORMAL

## 2022-07-06 ENCOUNTER — PATIENT MESSAGE (OUTPATIENT)
Dept: OBSTETRICS AND GYNECOLOGY | Facility: CLINIC | Age: 37
End: 2022-07-06
Payer: COMMERCIAL

## 2022-07-17 ENCOUNTER — PATIENT MESSAGE (OUTPATIENT)
Dept: OBSTETRICS AND GYNECOLOGY | Facility: CLINIC | Age: 37
End: 2022-07-17
Payer: COMMERCIAL

## 2022-07-19 ENCOUNTER — OFFICE VISIT (OUTPATIENT)
Dept: PODIATRY | Facility: CLINIC | Age: 37
End: 2022-07-19
Payer: COMMERCIAL

## 2022-07-19 VITALS
WEIGHT: 282.19 LBS | BODY MASS INDEX: 45.35 KG/M2 | HEART RATE: 101 BPM | HEIGHT: 66 IN | SYSTOLIC BLOOD PRESSURE: 126 MMHG | DIASTOLIC BLOOD PRESSURE: 83 MMHG

## 2022-07-19 DIAGNOSIS — B35.1 ONYCHOMYCOSIS DUE TO DERMATOPHYTE: Primary | ICD-10-CM

## 2022-07-19 DIAGNOSIS — L57.0 KERATOSIS: ICD-10-CM

## 2022-07-19 PROCEDURE — 3079F DIAST BP 80-89 MM HG: CPT | Mod: CPTII,S$GLB,, | Performed by: PODIATRIST

## 2022-07-19 PROCEDURE — 1160F RVW MEDS BY RX/DR IN RCRD: CPT | Mod: CPTII,S$GLB,, | Performed by: PODIATRIST

## 2022-07-19 PROCEDURE — 1159F PR MEDICATION LIST DOCUMENTED IN MEDICAL RECORD: ICD-10-PCS | Mod: CPTII,S$GLB,, | Performed by: PODIATRIST

## 2022-07-19 PROCEDURE — 3074F SYST BP LT 130 MM HG: CPT | Mod: CPTII,S$GLB,, | Performed by: PODIATRIST

## 2022-07-19 PROCEDURE — 3074F PR MOST RECENT SYSTOLIC BLOOD PRESSURE < 130 MM HG: ICD-10-PCS | Mod: CPTII,S$GLB,, | Performed by: PODIATRIST

## 2022-07-19 PROCEDURE — 3079F PR MOST RECENT DIASTOLIC BLOOD PRESSURE 80-89 MM HG: ICD-10-PCS | Mod: CPTII,S$GLB,, | Performed by: PODIATRIST

## 2022-07-19 PROCEDURE — 3008F BODY MASS INDEX DOCD: CPT | Mod: CPTII,S$GLB,, | Performed by: PODIATRIST

## 2022-07-19 PROCEDURE — 99204 OFFICE O/P NEW MOD 45 MIN: CPT | Mod: S$GLB,,, | Performed by: PODIATRIST

## 2022-07-19 PROCEDURE — 1159F MED LIST DOCD IN RCRD: CPT | Mod: CPTII,S$GLB,, | Performed by: PODIATRIST

## 2022-07-19 PROCEDURE — 99999 PR PBB SHADOW E&M-EST. PATIENT-LVL III: CPT | Mod: PBBFAC,,, | Performed by: PODIATRIST

## 2022-07-19 PROCEDURE — 1160F PR REVIEW ALL MEDS BY PRESCRIBER/CLIN PHARMACIST DOCUMENTED: ICD-10-PCS | Mod: CPTII,S$GLB,, | Performed by: PODIATRIST

## 2022-07-19 PROCEDURE — 99999 PR PBB SHADOW E&M-EST. PATIENT-LVL III: ICD-10-PCS | Mod: PBBFAC,,, | Performed by: PODIATRIST

## 2022-07-19 PROCEDURE — 99204 PR OFFICE/OUTPT VISIT, NEW, LEVL IV, 45-59 MIN: ICD-10-PCS | Mod: S$GLB,,, | Performed by: PODIATRIST

## 2022-07-19 PROCEDURE — 3008F PR BODY MASS INDEX (BMI) DOCUMENTED: ICD-10-PCS | Mod: CPTII,S$GLB,, | Performed by: PODIATRIST

## 2022-07-19 RX ORDER — CICLOPIROX 80 MG/ML
SOLUTION TOPICAL NIGHTLY
Qty: 6.6 ML | Refills: 11 | Status: SHIPPED | OUTPATIENT
Start: 2022-07-19 | End: 2023-03-30 | Stop reason: ALTCHOICE

## 2022-07-19 RX ORDER — CICLOPIROX 80 MG/ML
SOLUTION TOPICAL NIGHTLY
Qty: 6.6 ML | Refills: 11 | Status: SHIPPED | OUTPATIENT
Start: 2022-07-19 | End: 2022-07-19

## 2022-07-19 RX ORDER — AMMONIUM LACTATE 12 G/100G
CREAM TOPICAL
Qty: 140 G | Refills: 11 | Status: SHIPPED | OUTPATIENT
Start: 2022-07-19

## 2022-07-19 NOTE — PROGRESS NOTES
Subjective:      Patient ID: Shaina Mas is a 36 y.o. female.    Chief Complaint: Nail Problem (Fungus on bilateral toes )    Thick misshapen discolored toenails all 10 toes.  Gradual onset, worsening over past several weeks, aggravated by increased weight bearing, shoe gear, pressure.  No previous medical treatment.  OTC med not helping.     cc2 thirk hard skin both heels.  Gradual onset, worsening over past several weeks, aggravated by increased weight bearing, shoe gear, pressure.  No previous medical treatment.  OTC  med not helping.   Review of Systems   Constitutional: Negative for chills, diaphoresis, fever, malaise/fatigue and night sweats.   Cardiovascular: Negative for claudication, cyanosis, leg swelling and syncope.   Skin: Positive for nail changes and suspicious lesions. Negative for color change, dry skin, rash and unusual hair distribution.   Musculoskeletal: Negative for falls, joint pain, joint swelling, muscle cramps, muscle weakness and stiffness.   Gastrointestinal: Negative for constipation, diarrhea, nausea and vomiting.   Neurological: Negative for brief paralysis, disturbances in coordination, focal weakness, numbness, paresthesias, sensory change and tremors.              Objective:      Physical Exam  Constitutional:       General: She is not in acute distress.     Appearance: She is well-developed. She is not diaphoretic.   Cardiovascular:      Pulses:           Popliteal pulses are 2+ on the right side and 2+ on the left side.        Dorsalis pedis pulses are 2+ on the right side and 2+ on the left side.        Posterior tibial pulses are 2+ on the right side and 2+ on the left side.      Comments: Capillary refill 3 seconds all toes/distal feet, all toes/both feet warm to touch.      Negative lymphadenopathy bilateral popliteal fossa and tarsal tunnel.      Negavie lower extremity edema bilateral.    Musculoskeletal:      Right ankle: No swelling, deformity, ecchymosis or  lacerations. Normal range of motion. Normal pulse.      Right Achilles Tendon: Normal. No defects. Katz's test negative.   Lymphadenopathy:      Lower Body: No right inguinal adenopathy. No left inguinal adenopathy.      Comments: Negative lymphadenopathy bilateral popliteal fossa and tarsal tunnel.    Negative lymphangitic streaking bilateral feet/ankles/legs.   Skin:     General: Skin is warm and dry.      Capillary Refill: Capillary refill takes 2 to 3 seconds.      Coloration: Skin is not pale.      Findings: No abrasion, bruising, burn, ecchymosis, erythema, laceration, lesion or rash.      Nails: There is no clubbing.      Comments: Thick hard skin backs and bottoms of the heels right and left  without ulceration, drainage, pus, tracking, fluctuance, malodor, or cardinal signs infection.    Toenails 1st, 2nd, 3rd, 4th, 5th  bilateral are hypertrophic thickened 2-3 mm, dystrophic, discolored tanish brown with tan, gray crumbly subungual debris.  Tender to distal nail plate pressure, without periungual skin abnormality of each.    Otherwise, Skin is normal age and health appropriate color, turgor, texture, and temperature bilateral lower extremities without ulceration, hyperpigmentation, discoloration, masses nodules or cords palpated.  No ecchymosis, erythema, edema, or cardinal signs of infection bilateral lower extremities.    Neurological:      Mental Status: She is alert and oriented to person, place, and time.      Sensory: No sensory deficit.      Motor: No tremor, atrophy or abnormal muscle tone.      Gait: Gait normal.      Deep Tendon Reflexes:      Reflex Scores:       Patellar reflexes are 2+ on the right side and 2+ on the left side.       Achilles reflexes are 2+ on the right side and 2+ on the left side.  Psychiatric:         Behavior: Behavior is cooperative.               Assessment:       Encounter Diagnoses   Name Primary?    Onychomycosis due to dermatophyte Yes    Keratosis           Plan:       Shaina was seen today for nail problem.    Diagnoses and all orders for this visit:    Onychomycosis due to dermatophyte    Keratosis    Other orders  -     Discontinue: ciclopirox (PENLAC) 8 % Soln; Apply topically nightly.  -     ciclopirox (PENLAC) 8 % Soln; Apply topically nightly.  -     ammonium lactate 12 % Crea; Apply twice daily to affected parts both feet as needed.      I counseled the patient on her conditions, their implications and medical management.        Lac Hydrin cream twice daily both heels, topical Penlac once daily all toenails.    Discussed conservative treatment with shoes of adequate dimensions, material, and style to alleviate symptoms and delay or prevent surgical intervention.           Follow up if symptoms worsen or fail to improve.

## 2022-08-07 ENCOUNTER — OFFICE VISIT (OUTPATIENT)
Dept: URGENT CARE | Facility: CLINIC | Age: 37
End: 2022-08-07
Payer: COMMERCIAL

## 2022-08-07 VITALS
DIASTOLIC BLOOD PRESSURE: 99 MMHG | BODY MASS INDEX: 45.32 KG/M2 | WEIGHT: 282 LBS | RESPIRATION RATE: 18 BRPM | TEMPERATURE: 99 F | SYSTOLIC BLOOD PRESSURE: 145 MMHG | HEART RATE: 90 BPM | OXYGEN SATURATION: 98 % | HEIGHT: 66 IN

## 2022-08-07 DIAGNOSIS — J02.9 SORE THROAT: ICD-10-CM

## 2022-08-07 DIAGNOSIS — F17.200 CURRENT EVERY DAY SMOKER: ICD-10-CM

## 2022-08-07 DIAGNOSIS — Z86.16 HISTORY OF COVID-19: ICD-10-CM

## 2022-08-07 DIAGNOSIS — R05.9 COUGH: ICD-10-CM

## 2022-08-07 DIAGNOSIS — R68.83 CHILLS: ICD-10-CM

## 2022-08-07 DIAGNOSIS — U07.1 COVID-19 VIRUS INFECTION: Primary | ICD-10-CM

## 2022-08-07 DIAGNOSIS — R51.9 ACUTE NONINTRACTABLE HEADACHE, UNSPECIFIED HEADACHE TYPE: ICD-10-CM

## 2022-08-07 LAB
CTP QC/QA: YES
SARS-COV-2 RDRP RESP QL NAA+PROBE: POSITIVE

## 2022-08-07 PROCEDURE — 1160F PR REVIEW ALL MEDS BY PRESCRIBER/CLIN PHARMACIST DOCUMENTED: ICD-10-PCS | Mod: CPTII,S$GLB,, | Performed by: NURSE PRACTITIONER

## 2022-08-07 PROCEDURE — 1159F PR MEDICATION LIST DOCUMENTED IN MEDICAL RECORD: ICD-10-PCS | Mod: CPTII,S$GLB,, | Performed by: NURSE PRACTITIONER

## 2022-08-07 PROCEDURE — 1160F RVW MEDS BY RX/DR IN RCRD: CPT | Mod: CPTII,S$GLB,, | Performed by: NURSE PRACTITIONER

## 2022-08-07 PROCEDURE — 3008F PR BODY MASS INDEX (BMI) DOCUMENTED: ICD-10-PCS | Mod: CPTII,S$GLB,, | Performed by: NURSE PRACTITIONER

## 2022-08-07 PROCEDURE — 3080F DIAST BP >= 90 MM HG: CPT | Mod: CPTII,S$GLB,, | Performed by: NURSE PRACTITIONER

## 2022-08-07 PROCEDURE — 3008F BODY MASS INDEX DOCD: CPT | Mod: CPTII,S$GLB,, | Performed by: NURSE PRACTITIONER

## 2022-08-07 PROCEDURE — 3077F SYST BP >= 140 MM HG: CPT | Mod: CPTII,S$GLB,, | Performed by: NURSE PRACTITIONER

## 2022-08-07 PROCEDURE — 99214 PR OFFICE/OUTPT VISIT, EST, LEVL IV, 30-39 MIN: ICD-10-PCS | Mod: S$GLB,,, | Performed by: NURSE PRACTITIONER

## 2022-08-07 PROCEDURE — 3080F PR MOST RECENT DIASTOLIC BLOOD PRESSURE >= 90 MM HG: ICD-10-PCS | Mod: CPTII,S$GLB,, | Performed by: NURSE PRACTITIONER

## 2022-08-07 PROCEDURE — 1159F MED LIST DOCD IN RCRD: CPT | Mod: CPTII,S$GLB,, | Performed by: NURSE PRACTITIONER

## 2022-08-07 PROCEDURE — 3077F PR MOST RECENT SYSTOLIC BLOOD PRESSURE >= 140 MM HG: ICD-10-PCS | Mod: CPTII,S$GLB,, | Performed by: NURSE PRACTITIONER

## 2022-08-07 PROCEDURE — 99214 OFFICE O/P EST MOD 30 MIN: CPT | Mod: S$GLB,,, | Performed by: NURSE PRACTITIONER

## 2022-08-07 PROCEDURE — U0002 COVID-19 LAB TEST NON-CDC: HCPCS | Mod: QW,S$GLB,, | Performed by: NURSE PRACTITIONER

## 2022-08-07 PROCEDURE — U0002: ICD-10-PCS | Mod: QW,S$GLB,, | Performed by: NURSE PRACTITIONER

## 2022-08-07 RX ORDER — BENZONATATE 100 MG/1
100 CAPSULE ORAL 3 TIMES DAILY PRN
Qty: 30 CAPSULE | Refills: 0 | Status: SHIPPED | OUTPATIENT
Start: 2022-08-07 | End: 2022-08-17

## 2022-08-07 NOTE — PATIENT INSTRUCTIONS
POSITIVE COVID TEST       You have tested positive for COVID-19 today.  Please note that patients who test positive for COVID-19 are required by the CDC to undergo isolation for 5 days after their symptoms first began.       This isolation starts from the day you first developed symptoms, not the day of your positive test. For example, if your symptoms began on a Monday but tested positive on the following Wednesday, your 5-day isolation begins from that Monday, not the Wednesday you tested positive.       However, if you are asymptomatic (a person who does not have any symptoms) and COVID-19 positive, your 5-day isolation begins on the day you tested positive, regardless of exposure date.       Also, per the CDC guidelines, once your 5 days have passed, and you have not had fever greater than 100.4F in the last 24 hours without taking any fever reducers such as Tylenol (Acetaminophen) or Motrin (Ibuprofen), you may return to your normal activities including social distancing, wearing masks (continually for 5 more days), and frequent handwashing - YOU DO NOT NEED ANOTHER TEST IN ORDER TO END YOUR QUARANTINE.     Please drink plenty of fluids.    Please get plenty of rest.    If you do not have Hypertension or any history of palpitations, it is ok to take over the counter Sudafed or Mucinex D or Allegra-D or Claritin-D or Zyrtec-D.  If you do take one of the above, it is ok to combine that with plain over the counter Mucinex or Allegra or Claritin or Zyrtec.  If for example you are taking Zyrtec -D, you can combine that with Mucinex, but not Mucinex-D.  If you are taking Mucinex-D, you can combine that with plain Allegra or Claritin or Zyrtec.   If you do have Hypertension or palpitations, it is safe to take Coricidin HBP for relief of sinus symptoms.    We recommend you take Flonase (Fluticasone).    Nasal irrigation with a saline spray or Netti Pot like device per their directions is also recommended.    If not  allergic, please take over the counter Tylenol (Acetaminophen) and/or Motrin (Ibuprofen) as directed for control of pain and/or fever.    Please follow up with your primary care doctor or specialist as needed.    Please return here or go to the Emergency Department for any concerns or worsening of condition.      If you  smoke, please stop smoking.

## 2022-08-07 NOTE — PROGRESS NOTES
"Subjective:       Patient ID: Shaina Mas is a 36 y.o. female.    Vitals:  height is 5' 6" (1.676 m) and weight is 127.9 kg (282 lb). Her temperature is 98.6 °F (37 °C). Her blood pressure is 145/99 (abnormal) and her pulse is 90. Her respiration is 18 and oxygen saturation is 98%.     Chief Complaint: Cough    36-year-old female presents to clinic with complaints of chills, fever, sore throat, headache cough x 4 days.  History positive for Pfizer covid vaccine x 3 doses 2/27/21, 3/22/21, 1/14/22.  Reports tested positive for covid 19 several times    Cough  This is a new problem. The current episode started in the past 7 days (Thursday). The problem has been gradually worsening. The problem occurs constantly. The cough is productive of sputum. Associated symptoms include chills, a fever, headaches, nasal congestion, postnasal drip, rhinorrhea and a sore throat. Pertinent negatives include no chest pain, ear congestion, ear pain, heartburn, hemoptysis, myalgias, rash, shortness of breath, sweats, weight loss or wheezing. Nothing aggravates the symptoms. Risk factors for lung disease include occupational exposure. Treatments tried: Day/Night Quil. The treatment provided mild relief. There is no history of asthma, bronchiectasis, bronchitis, COPD, emphysema, environmental allergies or pneumonia.       Constitution: Positive for chills and fever.   HENT: Positive for congestion, postnasal drip and sore throat. Negative for ear pain.    Cardiovascular: Negative for chest pain.   Respiratory: Positive for cough. Negative for bloody sputum, shortness of breath and wheezing.    Gastrointestinal: Negative for abdominal pain, nausea and heartburn.   Musculoskeletal: Negative for muscle ache.   Skin: Negative for rash.   Allergic/Immunologic: Negative for environmental allergies.   Neurological: Positive for headaches. Negative for disorientation and altered mental status.   Psychiatric/Behavioral: Negative for altered " mental status, disorientation and confusion.       Objective:      Physical Exam   Constitutional: She is oriented to person, place, and time. She appears well-developed. She is cooperative.  Non-toxic appearance. She does not appear ill. No distress.   HENT:   Head: Normocephalic and atraumatic.   Ears:   Right Ear: Hearing, tympanic membrane, external ear and ear canal normal.   Left Ear: Hearing, tympanic membrane, external ear and ear canal normal.   Nose: Mucosal edema and rhinorrhea present. No nasal deformity. No epistaxis. Right sinus exhibits no maxillary sinus tenderness and no frontal sinus tenderness. Left sinus exhibits no maxillary sinus tenderness and no frontal sinus tenderness.   Mouth/Throat: Uvula is midline, oropharynx is clear and moist and mucous membranes are normal. No trismus in the jaw. Normal dentition. No uvula swelling. No oropharyngeal exudate, posterior oropharyngeal edema or posterior oropharyngeal erythema.   Eyes: Conjunctivae and lids are normal. No scleral icterus.   Neck: Trachea normal and phonation normal. Neck supple. No edema present. No erythema present. No neck rigidity present.   Cardiovascular: Normal rate, regular rhythm, normal heart sounds and normal pulses.   Pulmonary/Chest: Effort normal and breath sounds normal. No respiratory distress. She has no decreased breath sounds. She has no rhonchi.   Scattered crackles via bronchial region with cough         Comments: Scattered crackles via bronchial region with cough    Abdominal: Normal appearance.   Musculoskeletal: Normal range of motion.         General: No deformity. Normal range of motion.   Neurological: She is alert and oriented to person, place, and time. She exhibits normal muscle tone. Coordination normal.   Skin: Skin is warm, dry, intact, not diaphoretic and not pale.   Psychiatric: Her speech is normal and behavior is normal. Judgment and thought content normal.   Nursing note and vitals reviewed.         Assessment:       1. COVID-19 virus infection    2. Cough    3. Sore throat    4. Chills    5. Acute nonintractable headache, unspecified headache type    6. Current every day smoker    7. History of COVID-19        Results for orders placed or performed in visit on 08/07/22   POCT COVID-19 Rapid Screening   Result Value Ref Range    POC Rapid COVID Positive (A) Negative     Acceptable Yes      Plan:         COVID-19 virus infection    Cough  -     POCT COVID-19 Rapid Screening  -     benzonatate (TESSALON) 100 MG capsule; Take 1 capsule (100 mg total) by mouth 3 (three) times daily as needed.  Dispense: 30 capsule; Refill: 0    Sore throat    Chills    Acute nonintractable headache, unspecified headache type    Current every day smoker    History of COVID-19    1 covid risk score     Patient Instructions   POSITIVE COVID TEST       You have tested positive for COVID-19 today.  Please note that patients who test positive for COVID-19 are required by the CDC to undergo isolation for 5 days after their symptoms first began.       This isolation starts from the day you first developed symptoms, not the day of your positive test. For example, if your symptoms began on a Monday but tested positive on the following Wednesday, your 5-day isolation begins from that Monday, not the Wednesday you tested positive.       However, if you are asymptomatic (a person who does not have any symptoms) and COVID-19 positive, your 5-day isolation begins on the day you tested positive, regardless of exposure date.       Also, per the CDC guidelines, once your 5 days have passed, and you have not had fever greater than 100.4F in the last 24 hours without taking any fever reducers such as Tylenol (Acetaminophen) or Motrin (Ibuprofen), you may return to your normal activities including social distancing, wearing masks (continually for 5 more days), and frequent handwashing - YOU DO NOT NEED ANOTHER TEST IN ORDER TO END YOUR  QUARANTINE.     Please drink plenty of fluids.    Please get plenty of rest.    If you do not have Hypertension or any history of palpitations, it is ok to take over the counter Sudafed or Mucinex D or Allegra-D or Claritin-D or Zyrtec-D.  If you do take one of the above, it is ok to combine that with plain over the counter Mucinex or Allegra or Claritin or Zyrtec.  If for example you are taking Zyrtec -D, you can combine that with Mucinex, but not Mucinex-D.  If you are taking Mucinex-D, you can combine that with plain Allegra or Claritin or Zyrtec.   If you do have Hypertension or palpitations, it is safe to take Coricidin HBP for relief of sinus symptoms.    We recommend you take Flonase (Fluticasone).    Nasal irrigation with a saline spray or Netti Pot like device per their directions is also recommended.    If not allergic, please take over the counter Tylenol (Acetaminophen) and/or Motrin (Ibuprofen) as directed for control of pain and/or fever.    Please follow up with your primary care doctor or specialist as needed.    Please return here or go to the Emergency Department for any concerns or worsening of condition.      If you  smoke, please stop smoking.

## 2022-08-19 ENCOUNTER — OFFICE VISIT (OUTPATIENT)
Dept: OTOLARYNGOLOGY | Facility: CLINIC | Age: 37
End: 2022-08-19
Payer: COMMERCIAL

## 2022-08-19 VITALS — BODY MASS INDEX: 45.52 KG/M2 | WEIGHT: 282 LBS

## 2022-08-19 DIAGNOSIS — L29.9 ITCHING OF EAR: ICD-10-CM

## 2022-08-19 DIAGNOSIS — M26.629 TMJ PAIN DYSFUNCTION SYNDROME: Primary | ICD-10-CM

## 2022-08-19 PROCEDURE — 99999 PR PBB SHADOW E&M-EST. PATIENT-LVL II: CPT | Mod: PBBFAC,,, | Performed by: OTOLARYNGOLOGY

## 2022-08-19 PROCEDURE — 1159F MED LIST DOCD IN RCRD: CPT | Mod: CPTII,S$GLB,, | Performed by: OTOLARYNGOLOGY

## 2022-08-19 PROCEDURE — 3008F BODY MASS INDEX DOCD: CPT | Mod: CPTII,S$GLB,, | Performed by: OTOLARYNGOLOGY

## 2022-08-19 PROCEDURE — 99999 PR PBB SHADOW E&M-EST. PATIENT-LVL II: ICD-10-PCS | Mod: PBBFAC,,, | Performed by: OTOLARYNGOLOGY

## 2022-08-19 PROCEDURE — 3008F PR BODY MASS INDEX (BMI) DOCUMENTED: ICD-10-PCS | Mod: CPTII,S$GLB,, | Performed by: OTOLARYNGOLOGY

## 2022-08-19 PROCEDURE — 99213 OFFICE O/P EST LOW 20 MIN: CPT | Mod: S$GLB,,, | Performed by: OTOLARYNGOLOGY

## 2022-08-19 PROCEDURE — 1159F PR MEDICATION LIST DOCUMENTED IN MEDICAL RECORD: ICD-10-PCS | Mod: CPTII,S$GLB,, | Performed by: OTOLARYNGOLOGY

## 2022-08-19 PROCEDURE — 99213 PR OFFICE/OUTPT VISIT, EST, LEVL III, 20-29 MIN: ICD-10-PCS | Mod: S$GLB,,, | Performed by: OTOLARYNGOLOGY

## 2022-08-19 RX ORDER — FLUOCINOLONE ACETONIDE 0.11 MG/ML
4 OIL AURICULAR (OTIC) DAILY
Qty: 20 ML | Refills: 2 | Status: SHIPPED | OUTPATIENT
Start: 2022-08-19 | End: 2023-03-30 | Stop reason: ALTCHOICE

## 2022-08-19 NOTE — PROGRESS NOTES
Subjective:       Patient ID: Shaina Mas is a 36 y.o. female.    Chief Complaint: Otitis Media    HPI     Shaina Mas is a 36 y.o. female presents for follow up ear infection. She notes bilateral itchy ears and ear pain.  Denies otorrhea or hearing loss. Has not had a hx of PE tubes.    Review of Systems   Constitutional: Negative for chills and fever.   HENT: Negative for sore throat and trouble swallowing.    Respiratory: Negative for apnea and chest tightness.    Cardiovascular: Negative for chest pain.         Objective:      Physical Exam  Vitals and nursing note reviewed.   Constitutional:       Appearance: Normal appearance.   HENT:      Head: Normocephalic and atraumatic.      Jaw: Tenderness and pain on movement present.      Right Ear: Tympanic membrane, ear canal and external ear normal. No drainage. No middle ear effusion. There is no impacted cerumen.      Left Ear: Tympanic membrane, ear canal and external ear normal. No drainage.  No middle ear effusion. There is no impacted cerumen.   Neurological:      Mental Status: She is alert.           Assessment:       Problem List Items Addressed This Visit    None     Visit Diagnoses     TMJ pain dysfunction syndrome    -  Primary    Itching of ear              Plan:         exam shows clear middle ear spaces bilaterally and no signs of OM or OE     TMJ regimen with soft diet, NSAID's, Heating pad over joint for 20 minutes tid for 7-10 days. If no better consider re evaluation for use of muscle relaxants and or referral to Oral Surgery specialist.      Rx for derm otic for ear itching

## 2022-09-04 ENCOUNTER — OFFICE VISIT (OUTPATIENT)
Dept: URGENT CARE | Facility: CLINIC | Age: 37
End: 2022-09-04
Payer: COMMERCIAL

## 2022-09-04 VITALS
WEIGHT: 280 LBS | BODY MASS INDEX: 45 KG/M2 | HEART RATE: 107 BPM | DIASTOLIC BLOOD PRESSURE: 74 MMHG | RESPIRATION RATE: 18 BRPM | OXYGEN SATURATION: 98 % | TEMPERATURE: 99 F | HEIGHT: 66 IN | SYSTOLIC BLOOD PRESSURE: 125 MMHG

## 2022-09-04 DIAGNOSIS — J02.9 SORE THROAT: ICD-10-CM

## 2022-09-04 DIAGNOSIS — R00.0 TACHYCARDIA: ICD-10-CM

## 2022-09-04 DIAGNOSIS — R05.9 COUGH: Primary | ICD-10-CM

## 2022-09-04 DIAGNOSIS — R06.02 SHORTNESS OF BREATH: ICD-10-CM

## 2022-09-04 DIAGNOSIS — Z72.0 TOBACCO USE: ICD-10-CM

## 2022-09-04 DIAGNOSIS — Z72.0 DECLINED SMOKING CESSATION: ICD-10-CM

## 2022-09-04 DIAGNOSIS — R53.83 FATIGUE, UNSPECIFIED TYPE: ICD-10-CM

## 2022-09-04 DIAGNOSIS — R51.9 ACUTE NONINTRACTABLE HEADACHE, UNSPECIFIED HEADACHE TYPE: ICD-10-CM

## 2022-09-04 PROCEDURE — 3078F PR MOST RECENT DIASTOLIC BLOOD PRESSURE < 80 MM HG: ICD-10-PCS | Mod: CPTII,S$GLB,, | Performed by: NURSE PRACTITIONER

## 2022-09-04 PROCEDURE — U0002: ICD-10-PCS | Mod: QW,S$GLB,, | Performed by: NURSE PRACTITIONER

## 2022-09-04 PROCEDURE — 87502 INFLUENZA DNA AMP PROBE: CPT | Mod: QW,S$GLB,, | Performed by: NURSE PRACTITIONER

## 2022-09-04 PROCEDURE — 3074F PR MOST RECENT SYSTOLIC BLOOD PRESSURE < 130 MM HG: ICD-10-PCS | Mod: CPTII,S$GLB,, | Performed by: NURSE PRACTITIONER

## 2022-09-04 PROCEDURE — 99213 PR OFFICE/OUTPT VISIT, EST, LEVL III, 20-29 MIN: ICD-10-PCS | Mod: S$GLB,,, | Performed by: NURSE PRACTITIONER

## 2022-09-04 PROCEDURE — 3074F SYST BP LT 130 MM HG: CPT | Mod: CPTII,S$GLB,, | Performed by: NURSE PRACTITIONER

## 2022-09-04 PROCEDURE — 1159F PR MEDICATION LIST DOCUMENTED IN MEDICAL RECORD: ICD-10-PCS | Mod: CPTII,S$GLB,, | Performed by: NURSE PRACTITIONER

## 2022-09-04 PROCEDURE — 87502 POCT INFLUENZA A/B MOLECULAR: ICD-10-PCS | Mod: QW,S$GLB,, | Performed by: NURSE PRACTITIONER

## 2022-09-04 PROCEDURE — 1159F MED LIST DOCD IN RCRD: CPT | Mod: CPTII,S$GLB,, | Performed by: NURSE PRACTITIONER

## 2022-09-04 PROCEDURE — 3008F BODY MASS INDEX DOCD: CPT | Mod: CPTII,S$GLB,, | Performed by: NURSE PRACTITIONER

## 2022-09-04 PROCEDURE — 99213 OFFICE O/P EST LOW 20 MIN: CPT | Mod: S$GLB,,, | Performed by: NURSE PRACTITIONER

## 2022-09-04 PROCEDURE — 1160F PR REVIEW ALL MEDS BY PRESCRIBER/CLIN PHARMACIST DOCUMENTED: ICD-10-PCS | Mod: CPTII,S$GLB,, | Performed by: NURSE PRACTITIONER

## 2022-09-04 PROCEDURE — 3008F PR BODY MASS INDEX (BMI) DOCUMENTED: ICD-10-PCS | Mod: CPTII,S$GLB,, | Performed by: NURSE PRACTITIONER

## 2022-09-04 PROCEDURE — 3078F DIAST BP <80 MM HG: CPT | Mod: CPTII,S$GLB,, | Performed by: NURSE PRACTITIONER

## 2022-09-04 PROCEDURE — U0002 COVID-19 LAB TEST NON-CDC: HCPCS | Mod: QW,S$GLB,, | Performed by: NURSE PRACTITIONER

## 2022-09-04 PROCEDURE — 1160F RVW MEDS BY RX/DR IN RCRD: CPT | Mod: CPTII,S$GLB,, | Performed by: NURSE PRACTITIONER

## 2022-09-04 RX ORDER — BENZONATATE 100 MG/1
100 CAPSULE ORAL 3 TIMES DAILY PRN
Qty: 30 CAPSULE | Refills: 0 | Status: SHIPPED | OUTPATIENT
Start: 2022-09-04 | End: 2022-09-14

## 2022-09-04 RX ORDER — PREDNISONE 50 MG/1
50 TABLET ORAL DAILY
COMMUNITY
Start: 2022-03-28 | End: 2022-12-28

## 2022-09-04 NOTE — PATIENT INSTRUCTIONS
Please drink plenty of fluids.  Please get plenty of rest.    Please return here or go to the Emergency Department for any concerns or worsening of condition.      If you do not have Hypertension or any history of palpitations, it is ok to take over the counter Sudafed or Mucinex D or Allegra-D or Claritin-D or Zyrtec-D.  If you do take one of the above, it is ok to combine that with plain over the counter Mucinex or Allegra or Claritin or Zyrtec.  If for example you are taking Zyrtec -D, you can combine that with Mucinex, but not Mucinex-D.  If you are taking Mucinex-D, you can combine that with plain Allegra or Claritin or Zyrtec.   If you do have Hypertension or palpitations, it is safe to take Coricidin HBP for relief of sinus symptoms.    We recommend you take over the counter Flonase (Fluticasone) or another nasally inhaled steroid unless you are already taking one.    Nasal irrigation with a saline spray or Netti Pot like device per their directions is also recommended.    If not allergic, please take over the counter Tylenol (Acetaminophen) and/or Motrin (Ibuprofen) as directed for control of pain and/or fever.    Please follow up with your primary care doctor or specialist as needed.    If you  smoke, please stop smoking.

## 2022-09-04 NOTE — PROGRESS NOTES
"Subjective:       Patient ID: Shaina Mas is a 36 y.o. female.    Vitals:  height is 5' 6" (1.676 m) and weight is 127 kg (280 lb). Her temperature is 98.7 °F (37.1 °C). Her blood pressure is 125/74 and her pulse is 107. Her respiration is 18 and oxygen saturation is 98%.     Chief Complaint: Nasal Congestion    Patient states she has had body aches, congestion, runny nose, and fatigue for the past 3 days..    36-year-old female presents to clinic with complaints of body ache, watery nasal secretions, fatigue, sinus pressure, cough, headache and nasal congestion. History positive for covid 19 infection 8/7/22 and 11/4/20; Pfizer covid vaccine x 3 doses 2/27/21, /22/21, 1/14/22, everyday cigarette smoker. Treating with Flonase for sinus issues.    Sinus Problem  This is a new problem. The current episode started in the past 7 days. The problem has been gradually worsening since onset. Her pain is at a severity of 0/10. Associated symptoms include congestion, coughing, headaches, sinus pressure, sneezing and a sore throat. Pertinent negatives include no chills, diaphoresis or shortness of breath. Past treatments include acetaminophen.     Constitution: Negative for chills, sweating, fatigue and fever.   HENT:  Positive for congestion, postnasal drip, sinus pressure and sore throat.    Respiratory:  Positive for cough. Negative for shortness of breath, wheezing and asthma.    Gastrointestinal:  Negative for abdominal pain, nausea and vomiting.   Allergic/Immunologic: Positive for sneezing. Negative for seasonal allergies and asthma.   Neurological:  Positive for headaches.     Objective:      Physical Exam   Constitutional: She is oriented to person, place, and time. She appears well-developed. She is cooperative.  Non-toxic appearance. She does not appear ill. No distress.   HENT:   Head: Normocephalic and atraumatic.   Ears:   Right Ear: Hearing, tympanic membrane, external ear and ear canal normal.   Left Ear: " Hearing, tympanic membrane, external ear and ear canal normal.   Nose: Mucosal edema and rhinorrhea present. No nasal deformity. No epistaxis. Right sinus exhibits no maxillary sinus tenderness and no frontal sinus tenderness. Left sinus exhibits no maxillary sinus tenderness and no frontal sinus tenderness.   Mouth/Throat: Uvula is midline, oropharynx is clear and moist and mucous membranes are normal. No trismus in the jaw. Normal dentition. No uvula swelling. No oropharyngeal exudate, posterior oropharyngeal edema or posterior oropharyngeal erythema.   Eyes: Conjunctivae and lids are normal. No scleral icterus.   Neck: Trachea normal and phonation normal. Neck supple. No edema present. No erythema present. No neck rigidity present.   Cardiovascular: Normal rate, regular rhythm, normal heart sounds and normal pulses.   Pulmonary/Chest: Effort normal and breath sounds normal. No respiratory distress. She has no decreased breath sounds. She has no rhonchi.   Scattered crackles via bronchial region         Comments: Scattered crackles via bronchial region    Abdominal: Normal appearance.   Musculoskeletal: Normal range of motion.         General: No deformity. Normal range of motion.   Neurological: She is alert and oriented to person, place, and time. She exhibits normal muscle tone. Coordination normal.   Skin: Skin is warm, dry, intact, not diaphoretic and not pale.   Psychiatric: Her speech is normal and behavior is normal. Judgment and thought content normal.   Nursing note and vitals reviewed.      Assessment:       1. Cough    2. Acute nonintractable headache, unspecified headache type    3. Sore throat    4. Tachycardia    5. Shortness of breath    6. Fatigue, unspecified type    7. Tobacco use    8. Declined smoking cessation        Results for orders placed or performed in visit on 09/04/22   POCT Influenza A/B MOLECULAR   Result Value Ref Range    POC Molecular Influenza A Ag Negative Negative, Not  Reported    POC Molecular Influenza B Ag Negative Negative, Not Reported     Acceptable Yes    POCT COVID-19 Rapid Screening   Result Value Ref Range    POC Rapid COVID Negative Negative     Acceptable Yes       Plan:         Cough  -     POCT Influenza A/B MOLECULAR  -     POCT COVID-19 Rapid Screening  -     benzonatate (TESSALON) 100 MG capsule; Take 1 capsule (100 mg total) by mouth 3 (three) times daily as needed.  Dispense: 30 capsule; Refill: 0    Acute nonintractable headache, unspecified headache type    Sore throat    Tachycardia    Shortness of breath  -     POCT Influenza A/B MOLECULAR  -     POCT COVID-19 Rapid Screening    Fatigue, unspecified type    Tobacco use    Declined smoking cessation       Patient Instructions   Please drink plenty of fluids.  Please get plenty of rest.    Please return here or go to the Emergency Department for any concerns or worsening of condition.      If you do not have Hypertension or any history of palpitations, it is ok to take over the counter Sudafed or Mucinex D or Allegra-D or Claritin-D or Zyrtec-D.  If you do take one of the above, it is ok to combine that with plain over the counter Mucinex or Allegra or Claritin or Zyrtec.  If for example you are taking Zyrtec -D, you can combine that with Mucinex, but not Mucinex-D.  If you are taking Mucinex-D, you can combine that with plain Allegra or Claritin or Zyrtec.   If you do have Hypertension or palpitations, it is safe to take Coricidin HBP for relief of sinus symptoms.    We recommend you take over the counter Flonase (Fluticasone) or another nasally inhaled steroid unless you are already taking one.    Nasal irrigation with a saline spray or Netti Pot like device per their directions is also recommended.    If not allergic, please take over the counter Tylenol (Acetaminophen) and/or Motrin (Ibuprofen) as directed for control of pain and/or fever.    Please follow up with your primary  care doctor or specialist as needed.    If you  smoke, please stop smoking.

## 2022-11-15 ENCOUNTER — OFFICE VISIT (OUTPATIENT)
Dept: URGENT CARE | Facility: CLINIC | Age: 37
End: 2022-11-15
Payer: COMMERCIAL

## 2022-11-15 VITALS
DIASTOLIC BLOOD PRESSURE: 68 MMHG | RESPIRATION RATE: 20 BRPM | BODY MASS INDEX: 45 KG/M2 | HEART RATE: 84 BPM | WEIGHT: 280 LBS | TEMPERATURE: 99 F | SYSTOLIC BLOOD PRESSURE: 100 MMHG | HEIGHT: 66 IN | OXYGEN SATURATION: 98 %

## 2022-11-15 DIAGNOSIS — R50.9 FEVER, UNSPECIFIED FEVER CAUSE: ICD-10-CM

## 2022-11-15 DIAGNOSIS — R10.84 GENERALIZED ABDOMINAL PAIN: Primary | ICD-10-CM

## 2022-11-15 DIAGNOSIS — R11.0 NAUSEA: ICD-10-CM

## 2022-11-15 LAB
CTP QC/QA: YES
POC MOLECULAR INFLUENZA A AGN: NEGATIVE
POC MOLECULAR INFLUENZA B AGN: NEGATIVE

## 2022-11-15 PROCEDURE — 87502 POCT INFLUENZA A/B MOLECULAR: ICD-10-PCS | Mod: QW,S$GLB,, | Performed by: NURSE PRACTITIONER

## 2022-11-15 PROCEDURE — 1159F MED LIST DOCD IN RCRD: CPT | Mod: CPTII,S$GLB,, | Performed by: NURSE PRACTITIONER

## 2022-11-15 PROCEDURE — 1160F PR REVIEW ALL MEDS BY PRESCRIBER/CLIN PHARMACIST DOCUMENTED: ICD-10-PCS | Mod: CPTII,S$GLB,, | Performed by: NURSE PRACTITIONER

## 2022-11-15 PROCEDURE — 3078F PR MOST RECENT DIASTOLIC BLOOD PRESSURE < 80 MM HG: ICD-10-PCS | Mod: CPTII,S$GLB,, | Performed by: NURSE PRACTITIONER

## 2022-11-15 PROCEDURE — 3074F SYST BP LT 130 MM HG: CPT | Mod: CPTII,S$GLB,, | Performed by: NURSE PRACTITIONER

## 2022-11-15 PROCEDURE — 3074F PR MOST RECENT SYSTOLIC BLOOD PRESSURE < 130 MM HG: ICD-10-PCS | Mod: CPTII,S$GLB,, | Performed by: NURSE PRACTITIONER

## 2022-11-15 PROCEDURE — 3078F DIAST BP <80 MM HG: CPT | Mod: CPTII,S$GLB,, | Performed by: NURSE PRACTITIONER

## 2022-11-15 PROCEDURE — 1160F RVW MEDS BY RX/DR IN RCRD: CPT | Mod: CPTII,S$GLB,, | Performed by: NURSE PRACTITIONER

## 2022-11-15 PROCEDURE — 87502 INFLUENZA DNA AMP PROBE: CPT | Mod: QW,S$GLB,, | Performed by: NURSE PRACTITIONER

## 2022-11-15 PROCEDURE — 99214 OFFICE O/P EST MOD 30 MIN: CPT | Mod: S$GLB,,, | Performed by: NURSE PRACTITIONER

## 2022-11-15 PROCEDURE — 1159F PR MEDICATION LIST DOCUMENTED IN MEDICAL RECORD: ICD-10-PCS | Mod: CPTII,S$GLB,, | Performed by: NURSE PRACTITIONER

## 2022-11-15 PROCEDURE — 99214 PR OFFICE/OUTPT VISIT, EST, LEVL IV, 30-39 MIN: ICD-10-PCS | Mod: S$GLB,,, | Performed by: NURSE PRACTITIONER

## 2022-11-15 PROCEDURE — 3008F BODY MASS INDEX DOCD: CPT | Mod: CPTII,S$GLB,, | Performed by: NURSE PRACTITIONER

## 2022-11-15 PROCEDURE — 3008F PR BODY MASS INDEX (BMI) DOCUMENTED: ICD-10-PCS | Mod: CPTII,S$GLB,, | Performed by: NURSE PRACTITIONER

## 2022-11-15 RX ORDER — DICYCLOMINE HYDROCHLORIDE 20 MG/1
20 TABLET ORAL 3 TIMES DAILY PRN
Qty: 21 TABLET | Refills: 0 | Status: SHIPPED | OUTPATIENT
Start: 2022-11-15 | End: 2022-11-16 | Stop reason: SDUPTHER

## 2022-11-15 RX ORDER — ONDANSETRON 4 MG/1
TABLET, ORALLY DISINTEGRATING ORAL
Qty: 10 TABLET | Refills: 0 | Status: SHIPPED | OUTPATIENT
Start: 2022-11-15 | End: 2023-03-30 | Stop reason: ALTCHOICE

## 2022-11-15 NOTE — PATIENT INSTRUCTIONS
If your condition worsens or fails to improve we recommend that you receive another evaluation at the ER immediately or contact your PCP to discuss your concerns or return here. You must understand that you've received an urgent care treatment only and that you may be released before all your medical problems are known or treated. You the patient will arrange for followup care as instructed.   Zofran as needed for nausea or vomiting.  Bentyl as needed for abdominal pain or cramping.   Maintain hydration by drinking small amounts of clear fluids frequently, then soft diet, and then advance diet as tolerated. May use OTC Imodium OR Pepto Bismol if desired for any diarrhea.    Watch for any increase pain, fever, localized pain to right lower abdomen or continued vomiting or diarrhea.

## 2022-11-15 NOTE — PROGRESS NOTES
"Subjective:       Patient ID: Shaina Mas is a 37 y.o. female.    Vitals:  height is 5' 6" (1.676 m) and weight is 127 kg (280 lb). Her temperature is 98.6 °F (37 °C). Her blood pressure is 100/68 and her pulse is 84. Her respiration is 20 and oxygen saturation is 98%.     Chief Complaint: Abdominal Pain    Patient presents with generalized abdominal pain while on school field trip with kids with nausea, chills, and sweats.  She still has generalized abdominal pain.  She states that it could be caused from accidentally getting whole milk instead of soy milk at [facility], she drank this right before symptoms started.  She does not have a history of lactose intolerance, however suspects that she does and usually stays away from dairy.  Denies measured fever.  States the subjective fever is now better.  Last BM she reports as normal yesterday.      Abdominal Pain  This is a new problem. The current episode started yesterday. The onset quality is sudden. The problem occurs intermittently. The problem has been unchanged. The pain is located in the generalized abdominal region. The pain is at a severity of 5/10. The pain is moderate. The quality of the pain is cramping. The abdominal pain does not radiate. Associated symptoms include myalgias and nausea. Pertinent negatives include no anorexia, arthralgias, belching, constipation, diarrhea, dysuria, fever, flatus, frequency, headaches, hematochezia, hematuria, melena, vomiting or weight loss. The pain is aggravated by certain positions. The pain is relieved by Nothing. Treatments tried: pepto. The treatment provided no relief.     Constitution: Positive for appetite change, chills and sweating. Negative for fatigue and fever.   Gastrointestinal:  Positive for abdominal pain and nausea. Negative for vomiting, constipation, diarrhea and bright red blood in stool.   Genitourinary:  Negative for dysuria, frequency and hematuria.   Musculoskeletal:  Positive for muscle " ache. Negative for joint pain.   Neurological:  Negative for headaches.     Objective:      Physical Exam   Constitutional: She is oriented to person, place, and time. She appears well-developed.  Non-toxic appearance. She does not appear ill. No distress.   HENT:   Head: Normocephalic and atraumatic.   Ears:   Right Ear: External ear normal.   Left Ear: External ear normal.   Nose: Nose normal.   Mouth/Throat: Mucous membranes are normal.   Eyes: Conjunctivae and lids are normal.   Neck: Trachea normal. Neck supple.   Cardiovascular: Normal rate, regular rhythm and normal heart sounds.   Pulmonary/Chest: Effort normal and breath sounds normal. No respiratory distress.   Abdominal: Normal appearance. She exhibits no distension and no mass. Soft. Bowel sounds are increased. There is generalized abdominal tenderness. There is no rebound, no guarding, no left CVA tenderness and no right CVA tenderness.      Comments: No focal tenderness.  Abdomen soft.   Musculoskeletal: Normal range of motion.         General: Normal range of motion.   Neurological: She is alert and oriented to person, place, and time. She has normal strength.   Skin: Skin is warm, dry, intact, not diaphoretic and not pale.   Psychiatric: Her speech is normal and behavior is normal. Judgment and thought content normal.   Nursing note and vitals reviewed.      Results for orders placed or performed in visit on 11/15/22   POCT Influenza A/B MOLECULAR   Result Value Ref Range    POC Molecular Influenza A Ag Negative Negative, Not Reported    POC Molecular Influenza B Ag Negative Negative, Not Reported     Acceptable Yes        Assessment:       1. Generalized abdominal pain    2. Fever, unspecified fever cause    3. Nausea          Plan:       Suspect viral gastroenteritis versus stomach upset s/t lactose.  Strict ER precautions given.  Supportive care encouraged.  F/u closely.    Generalized abdominal pain  -     dicyclomine (BENTYL) 20  mg tablet; Take 1 tablet (20 mg total) by mouth 3 (three) times daily as needed (abdominal cramping).  Dispense: 21 tablet; Refill: 0    Fever, unspecified fever cause  -     POCT Influenza A/B MOLECULAR    Nausea  -     ondansetron (ZOFRAN-ODT) 4 MG TbDL; One tablet sublingual tid prn nausea  Dispense: 10 tablet; Refill: 0       Patient Instructions     If your condition worsens or fails to improve we recommend that you receive another evaluation at the ER immediately or contact your PCP to discuss your concerns or return here. You must understand that you've received an urgent care treatment only and that you may be released before all your medical problems are known or treated. You the patient will arrange for followup care as instructed.   Zofran as needed for nausea or vomiting.  Bentyl as needed for abdominal pain or cramping.   Maintain hydration by drinking small amounts of clear fluids frequently, then soft diet, and then advance diet as tolerated. May use OTC Imodium OR Pepto Bismol if desired for any diarrhea.    Watch for any increase pain, fever, localized pain to right lower abdomen or continued vomiting or diarrhea.

## 2022-11-15 NOTE — LETTER
November 15, 2022      [facility] [address] [address]  Phone: [phone]  Fax: [phone]       Patient: Shaina Mas   YOB: 1985  Date of Visit: 11/15/2022    To Whom It May Concern:    Nevaeh Mas  was at [facility] on 11/15/2022. Please excuse from missed work on 1/16/22 and 11/17/22. If you have any questions or concerns, or if I can be of further assistance, please do not hesitate to contact me.    Sincerely,          Ely Roy, NP

## 2022-11-16 ENCOUNTER — TELEPHONE (OUTPATIENT)
Dept: URGENT CARE | Facility: CLINIC | Age: 37
End: 2022-11-16
Payer: COMMERCIAL

## 2022-11-16 DIAGNOSIS — R10.84 GENERALIZED ABDOMINAL PAIN: ICD-10-CM

## 2022-11-16 RX ORDER — DICYCLOMINE HYDROCHLORIDE 20 MG/1
20 TABLET ORAL 3 TIMES DAILY PRN
Qty: 21 TABLET | Refills: 0 | Status: SHIPPED | OUTPATIENT
Start: 2022-11-16 | End: 2022-11-23

## 2022-11-16 NOTE — TELEPHONE ENCOUNTER
Pt on phone, stating her original pharmacy is closed and would like medications sent to a different pharmacy. Medication sent to [facility] at [phone].

## 2022-12-16 ENCOUNTER — TELEPHONE (OUTPATIENT)
Dept: INTERNAL MEDICINE | Facility: CLINIC | Age: 37
End: 2022-12-16
Payer: COMMERCIAL

## 2022-12-28 ENCOUNTER — OFFICE VISIT (OUTPATIENT)
Dept: INTERNAL MEDICINE | Facility: CLINIC | Age: 37
End: 2022-12-28
Payer: COMMERCIAL

## 2022-12-28 ENCOUNTER — PATIENT MESSAGE (OUTPATIENT)
Dept: OBSTETRICS AND GYNECOLOGY | Facility: CLINIC | Age: 37
End: 2022-12-28
Payer: COMMERCIAL

## 2022-12-28 VITALS
WEIGHT: 288.5 LBS | OXYGEN SATURATION: 98 % | BODY MASS INDEX: 46.36 KG/M2 | HEIGHT: 66 IN | SYSTOLIC BLOOD PRESSURE: 122 MMHG | DIASTOLIC BLOOD PRESSURE: 72 MMHG | HEART RATE: 94 BPM

## 2022-12-28 DIAGNOSIS — J30.2 SEASONAL ALLERGIES: ICD-10-CM

## 2022-12-28 DIAGNOSIS — Z00.00 ANNUAL PHYSICAL EXAM: Primary | ICD-10-CM

## 2022-12-28 DIAGNOSIS — R10.10 UPPER ABDOMINAL PAIN: ICD-10-CM

## 2022-12-28 PROCEDURE — 3078F PR MOST RECENT DIASTOLIC BLOOD PRESSURE < 80 MM HG: ICD-10-PCS | Mod: CPTII,S$GLB,, | Performed by: PHYSICIAN ASSISTANT

## 2022-12-28 PROCEDURE — 1159F PR MEDICATION LIST DOCUMENTED IN MEDICAL RECORD: ICD-10-PCS | Mod: CPTII,S$GLB,, | Performed by: PHYSICIAN ASSISTANT

## 2022-12-28 PROCEDURE — 3008F BODY MASS INDEX DOCD: CPT | Mod: CPTII,S$GLB,, | Performed by: PHYSICIAN ASSISTANT

## 2022-12-28 PROCEDURE — 99214 OFFICE O/P EST MOD 30 MIN: CPT | Mod: S$GLB,,, | Performed by: PHYSICIAN ASSISTANT

## 2022-12-28 PROCEDURE — 3074F SYST BP LT 130 MM HG: CPT | Mod: CPTII,S$GLB,, | Performed by: PHYSICIAN ASSISTANT

## 2022-12-28 PROCEDURE — 3008F PR BODY MASS INDEX (BMI) DOCUMENTED: ICD-10-PCS | Mod: CPTII,S$GLB,, | Performed by: PHYSICIAN ASSISTANT

## 2022-12-28 PROCEDURE — 1159F MED LIST DOCD IN RCRD: CPT | Mod: CPTII,S$GLB,, | Performed by: PHYSICIAN ASSISTANT

## 2022-12-28 PROCEDURE — 99214 PR OFFICE/OUTPT VISIT, EST, LEVL IV, 30-39 MIN: ICD-10-PCS | Mod: S$GLB,,, | Performed by: PHYSICIAN ASSISTANT

## 2022-12-28 PROCEDURE — 3078F DIAST BP <80 MM HG: CPT | Mod: CPTII,S$GLB,, | Performed by: PHYSICIAN ASSISTANT

## 2022-12-28 PROCEDURE — 99999 PR PBB SHADOW E&M-EST. PATIENT-LVL IV: ICD-10-PCS | Mod: PBBFAC,,, | Performed by: PHYSICIAN ASSISTANT

## 2022-12-28 PROCEDURE — 99999 PR PBB SHADOW E&M-EST. PATIENT-LVL IV: CPT | Mod: PBBFAC,,, | Performed by: PHYSICIAN ASSISTANT

## 2022-12-28 PROCEDURE — 3074F PR MOST RECENT SYSTOLIC BLOOD PRESSURE < 130 MM HG: ICD-10-PCS | Mod: CPTII,S$GLB,, | Performed by: PHYSICIAN ASSISTANT

## 2022-12-28 RX ORDER — MONTELUKAST SODIUM 10 MG/1
10 TABLET ORAL NIGHTLY
Qty: 30 TABLET | Refills: 0 | Status: SHIPPED | OUTPATIENT
Start: 2022-12-28 | End: 2023-01-20

## 2022-12-28 NOTE — PROGRESS NOTES
Internal Medicine Annual Exam       CHIEF COMPLAINT     The patient, Shaina Mas, who is a 37 y.o. female presents for an annual exam.    HPI     PCP is Primary Doctor No, patient is new to me.     Patient presents for annual exam. She will need to establish care with an MD at a later date.   Today she is feeling well and has no complaints but does report that she has noticed some abdominal cramping and p[ain after eating diary and high fat foods. Some subsequent diarrhea noticed. No blood in stool - in general she does not consume much dairy (milk, cheese) She reports that when she does lately, it is a problem. She has never had any abdominal surgeries.     She has seasonal allergies -already takes flonase, 2nd gen antihystamines cause drowsiness - will start Rx fo singular.     She has upper abdominal pain and cramping after eating diary and high fat meals -will get US of gallbaldder - if normal recommend sie avoid diary and high fat foods.     Annual labs    Discussed vaccines        Past Medical History:  Past Medical History:   Diagnosis Date    COVID-19 virus infection 11/14/2020    Smoker        History reviewed. No pertinent surgical history.     Family History   Problem Relation Age of Onset    No Known Problems Mother     No Known Problems Father         Social History     Socioeconomic History    Marital status: Unknown   Tobacco Use    Smoking status: Every Day     Types: Cigarettes    Smokeless tobacco: Never   Substance and Sexual Activity    Alcohol use: Yes    Drug use: Never    Sexual activity: Yes     Partners: Male     Birth control/protection: None   Social History Narrative    ** Merged History Encounter **             Social History     Tobacco Use   Smoking Status Every Day    Types: Cigarettes   Smokeless Tobacco Never        Allergies as of 12/28/2022    (Not on File)          Home Medications:  Prior to Admission medications    Medication Sig Start Date End Date Taking? Authorizing  Provider   albuterol (PROVENTIL/VENTOLIN HFA) 90 mcg/actuation inhaler Inhale 2 puffs into the lungs every 6 (six) hours as needed for Wheezing or Shortness of Breath. Rescue 1/28/20  Yes Emma Sánchez NP   ammonium lactate 12 % Crea Apply twice daily to affected parts both feet as needed. 7/19/22  Yes Cyrus Moses DPM   ciclopirox (PENLAC) 8 % Soln Apply topically nightly. 7/19/22  Yes Cyrus Moses DPM   fluocinolone acetonide oiL (DERMOTIC OIL) 0.01 % Drop Place 4 drops in ear(s) once daily. 8/19/22  Yes Bharathi Munguia MD   fluticasone propionate (FLONASE) 50 mcg/actuation nasal spray 1 spray (50 mcg total) by Each Nostril route 2 (two) times daily. 6/14/22  Yes Foster Fonseca PA-C   norelgestromin-ethinyl estradiol 150-35 mcg/24 hr Place 1 patch onto the skin once a week. CHANGE PATCH Q WEEK X 3 WEEKS THEN ONE WEEK OFF WITH NO PATCH 10/10/22  Yes Felix St Jr., MD   ondansetron (ZOFRAN-ODT) 4 MG TbDL One tablet sublingual tid prn nausea 11/15/22  Yes Ely Roy NP   predniSONE (DELTASONE) 50 MG Tab Take 50 mg by mouth once daily. 3/28/22   Historical Provider       Review of Systems:  Review of Systems   Constitutional:  Negative for chills and fever.   HENT:  Negative for sore throat and trouble swallowing.    Eyes:  Negative for visual disturbance.   Respiratory:  Negative for cough and shortness of breath.    Cardiovascular:  Negative for chest pain.   Gastrointestinal:  Negative for abdominal pain, constipation, diarrhea, nausea and vomiting.   Genitourinary:  Negative for dysuria and flank pain.   Musculoskeletal:  Negative for back pain, neck pain and neck stiffness.   Skin:  Negative for rash.   Neurological:  Negative for dizziness, syncope, weakness and headaches.   Psychiatric/Behavioral:  Negative for confusion.      Health Maintainence:   Immunizations:  Health Maintenance         Date Due Completion Date    Hepatitis C Screening Never done ---    Lipid Panel Never done  "---    Pneumococcal Vaccines (Age 0-64) (1 - PCV) Never done ---    HIV Screening Never done ---    TETANUS VACCINE Never done ---    COVID-19 Vaccine (4 - Booster for Pfizer series) 03/11/2022 1/14/2022    Influenza Vaccine (1) Never done ---    Hemoglobin A1c (Diabetic Prevention Screening) Never done ---    Cervical Cancer Screening 06/16/2025 6/16/2022             PHYSICAL EXAM     /72 (BP Location: Right arm, Patient Position: Sitting, BP Method: Large (Automatic))   Pulse 94   Ht 5' 6" (1.676 m)   Wt 130.8 kg (288 lb 7.6 oz)   SpO2 98%   BMI 46.56 kg/m²  Body mass index is 46.56 kg/m².    Physical Exam  Vitals and nursing note reviewed.   Constitutional:       Appearance: Normal appearance.      Comments: Healthy appearing female in NAD or apparent pain. She makes good eye contact, speaks in clear full sentences and ambulates with ease.        HENT:      Head: Normocephalic and atraumatic.      Nose: Nose normal.      Mouth/Throat:      Pharynx: Oropharynx is clear.   Eyes:      Conjunctiva/sclera: Conjunctivae normal.   Cardiovascular:      Rate and Rhythm: Normal rate and regular rhythm.      Pulses: Normal pulses.   Pulmonary:      Effort: No respiratory distress.   Abdominal:      Tenderness: There is no abdominal tenderness.      Comments: Mild upper abdominal TTP to RUQ and epigastric region   Otherwise benign abdomen    Musculoskeletal:         General: Normal range of motion.      Cervical back: No rigidity.   Skin:     General: Skin is warm and dry.      Capillary Refill: Capillary refill takes less than 2 seconds.      Findings: No rash.   Neurological:      General: No focal deficit present.      Mental Status: She is alert.      Gait: Gait normal.   Psychiatric:         Mood and Affect: Mood normal.       LABS     No results found for: LABA1C, HGBA1C  CMP  No results found for: NA, K, CL, CO2, GLU, BUN, CREATININE, CALCIUM, PROT, ALBUMIN, BILITOT, ALKPHOS, AST, ALT, ANIONGAP, " ESTGFRAFRICA, EGFRNONAA  No results found for: WBC, HGB, HCT, MCV, PLT  No results found for: CHOL  No results found for: HDL  No results found for: LDLCALC  No results found for: TRIG  No results found for: CHOLHDL  No results found for: TSH, N3HWZCS, F5OLUQR, THYROIDAB    ASSESSMENT/PLAN     Shaina Mas is a 37 y.o. female    Shaina was seen today for annual exam.    Diagnoses and all orders for this visit:    Annual physical exam  -     CBC Auto Differential; Future  -     Comprehensive Metabolic Panel; Future  -     Hemoglobin A1C; Future  -     Lipid Panel; Future  -     TSH; Future  -     HEPATITIS C ANTIBODY; Future  -     HIV 1/2 Ag/Ab (4th Gen); Future    Upper abdominal pain  -     US Abdomen Limited_Gallbladder; Future    Seasonal allergies  -     montelukast (SINGULAIR) 10 mg tablet; Take 1 tablet (10 mg total) by mouth every evening.        April Louis PA-C  Department of Internal Medicine - [facility]   12:29 PM      no

## 2022-12-29 ENCOUNTER — HOSPITAL ENCOUNTER (OUTPATIENT)
Dept: RADIOLOGY | Facility: OTHER | Age: 37
Discharge: HOME | End: 2022-12-29
Attending: PHYSICIAN ASSISTANT
Payer: COMMERCIAL

## 2022-12-29 DIAGNOSIS — R10.10 UPPER ABDOMINAL PAIN: ICD-10-CM

## 2022-12-29 PROCEDURE — 76705 US ABDOMEN LIMITED_GALLBLADDER: ICD-10-PCS | Mod: 26,,, | Performed by: RADIOLOGY

## 2022-12-29 PROCEDURE — 76705 ECHO EXAM OF ABDOMEN: CPT | Mod: 26,,, | Performed by: RADIOLOGY

## 2022-12-29 PROCEDURE — 76705 ECHO EXAM OF ABDOMEN: CPT | Mod: TC

## 2023-03-30 ENCOUNTER — OFFICE VISIT (OUTPATIENT)
Dept: INTERNAL MEDICINE | Facility: CLINIC | Age: 38
End: 2023-03-30
Payer: COMMERCIAL

## 2023-03-30 ENCOUNTER — LAB VISIT (OUTPATIENT)
Dept: LAB | Facility: OTHER | Age: 38
End: 2023-03-30
Attending: STUDENT IN AN ORGANIZED HEALTH CARE EDUCATION/TRAINING PROGRAM
Payer: COMMERCIAL

## 2023-03-30 ENCOUNTER — PATIENT MESSAGE (OUTPATIENT)
Dept: INTERNAL MEDICINE | Facility: CLINIC | Age: 38
End: 2023-03-30

## 2023-03-30 VITALS
DIASTOLIC BLOOD PRESSURE: 78 MMHG | SYSTOLIC BLOOD PRESSURE: 122 MMHG | BODY MASS INDEX: 46.93 KG/M2 | HEART RATE: 80 BPM | OXYGEN SATURATION: 98 % | WEIGHT: 290.81 LBS

## 2023-03-30 DIAGNOSIS — Z20.2 EXPOSURE TO SEXUALLY TRANSMITTED DISEASE (STD): ICD-10-CM

## 2023-03-30 DIAGNOSIS — L68.0 HIRSUTISM: ICD-10-CM

## 2023-03-30 DIAGNOSIS — Z31.69 ENCOUNTER FOR PRECONCEPTION CONSULTATION: ICD-10-CM

## 2023-03-30 DIAGNOSIS — Z91.09 ENVIRONMENTAL ALLERGIES: ICD-10-CM

## 2023-03-30 DIAGNOSIS — R73.03 PREDIABETES: ICD-10-CM

## 2023-03-30 DIAGNOSIS — Z00.00 HEALTH MAINTENANCE EXAMINATION: ICD-10-CM

## 2023-03-30 DIAGNOSIS — Z00.00 HEALTH MAINTENANCE EXAMINATION: Primary | ICD-10-CM

## 2023-03-30 LAB — HCG INTACT+B SERPL-ACNC: <1.2 MIU/ML

## 2023-03-30 PROCEDURE — 83520 IMMUNOASSAY QUANT NOS NONAB: CPT | Performed by: STUDENT IN AN ORGANIZED HEALTH CARE EDUCATION/TRAINING PROGRAM

## 2023-03-30 PROCEDURE — 84702 CHORIONIC GONADOTROPIN TEST: CPT | Performed by: STUDENT IN AN ORGANIZED HEALTH CARE EDUCATION/TRAINING PROGRAM

## 2023-03-30 PROCEDURE — 1159F MED LIST DOCD IN RCRD: CPT | Mod: CPTII,S$GLB,, | Performed by: STUDENT IN AN ORGANIZED HEALTH CARE EDUCATION/TRAINING PROGRAM

## 2023-03-30 PROCEDURE — 3074F PR MOST RECENT SYSTOLIC BLOOD PRESSURE < 130 MM HG: ICD-10-PCS | Mod: CPTII,S$GLB,, | Performed by: STUDENT IN AN ORGANIZED HEALTH CARE EDUCATION/TRAINING PROGRAM

## 2023-03-30 PROCEDURE — 3078F PR MOST RECENT DIASTOLIC BLOOD PRESSURE < 80 MM HG: ICD-10-PCS | Mod: CPTII,S$GLB,, | Performed by: STUDENT IN AN ORGANIZED HEALTH CARE EDUCATION/TRAINING PROGRAM

## 2023-03-30 PROCEDURE — 99214 OFFICE O/P EST MOD 30 MIN: CPT | Mod: S$GLB,,, | Performed by: STUDENT IN AN ORGANIZED HEALTH CARE EDUCATION/TRAINING PROGRAM

## 2023-03-30 PROCEDURE — 36415 COLL VENOUS BLD VENIPUNCTURE: CPT | Performed by: STUDENT IN AN ORGANIZED HEALTH CARE EDUCATION/TRAINING PROGRAM

## 2023-03-30 PROCEDURE — 84146 ASSAY OF PROLACTIN: CPT | Performed by: STUDENT IN AN ORGANIZED HEALTH CARE EDUCATION/TRAINING PROGRAM

## 2023-03-30 PROCEDURE — 99214 PR OFFICE/OUTPT VISIT, EST, LEVL IV, 30-39 MIN: ICD-10-PCS | Mod: S$GLB,,, | Performed by: STUDENT IN AN ORGANIZED HEALTH CARE EDUCATION/TRAINING PROGRAM

## 2023-03-30 PROCEDURE — 3078F DIAST BP <80 MM HG: CPT | Mod: CPTII,S$GLB,, | Performed by: STUDENT IN AN ORGANIZED HEALTH CARE EDUCATION/TRAINING PROGRAM

## 2023-03-30 PROCEDURE — 3074F SYST BP LT 130 MM HG: CPT | Mod: CPTII,S$GLB,, | Performed by: STUDENT IN AN ORGANIZED HEALTH CARE EDUCATION/TRAINING PROGRAM

## 2023-03-30 PROCEDURE — 83001 ASSAY OF GONADOTROPIN (FSH): CPT | Performed by: STUDENT IN AN ORGANIZED HEALTH CARE EDUCATION/TRAINING PROGRAM

## 2023-03-30 PROCEDURE — 99999 PR PBB SHADOW E&M-EST. PATIENT-LVL V: ICD-10-PCS | Mod: PBBFAC,,, | Performed by: STUDENT IN AN ORGANIZED HEALTH CARE EDUCATION/TRAINING PROGRAM

## 2023-03-30 PROCEDURE — 87340 HEPATITIS B SURFACE AG IA: CPT | Performed by: STUDENT IN AN ORGANIZED HEALTH CARE EDUCATION/TRAINING PROGRAM

## 2023-03-30 PROCEDURE — 3008F PR BODY MASS INDEX (BMI) DOCUMENTED: ICD-10-PCS | Mod: CPTII,S$GLB,, | Performed by: STUDENT IN AN ORGANIZED HEALTH CARE EDUCATION/TRAINING PROGRAM

## 2023-03-30 PROCEDURE — 84403 ASSAY OF TOTAL TESTOSTERONE: CPT | Performed by: STUDENT IN AN ORGANIZED HEALTH CARE EDUCATION/TRAINING PROGRAM

## 2023-03-30 PROCEDURE — 82670 ASSAY OF TOTAL ESTRADIOL: CPT | Performed by: STUDENT IN AN ORGANIZED HEALTH CARE EDUCATION/TRAINING PROGRAM

## 2023-03-30 PROCEDURE — 3008F BODY MASS INDEX DOCD: CPT | Mod: CPTII,S$GLB,, | Performed by: STUDENT IN AN ORGANIZED HEALTH CARE EDUCATION/TRAINING PROGRAM

## 2023-03-30 PROCEDURE — 84270 ASSAY OF SEX HORMONE GLOBUL: CPT | Performed by: STUDENT IN AN ORGANIZED HEALTH CARE EDUCATION/TRAINING PROGRAM

## 2023-03-30 PROCEDURE — 1159F PR MEDICATION LIST DOCUMENTED IN MEDICAL RECORD: ICD-10-PCS | Mod: CPTII,S$GLB,, | Performed by: STUDENT IN AN ORGANIZED HEALTH CARE EDUCATION/TRAINING PROGRAM

## 2023-03-30 PROCEDURE — 99999 PR PBB SHADOW E&M-EST. PATIENT-LVL V: CPT | Mod: PBBFAC,,, | Performed by: STUDENT IN AN ORGANIZED HEALTH CARE EDUCATION/TRAINING PROGRAM

## 2023-03-30 PROCEDURE — 86592 SYPHILIS TEST NON-TREP QUAL: CPT | Performed by: STUDENT IN AN ORGANIZED HEALTH CARE EDUCATION/TRAINING PROGRAM

## 2023-03-30 NOTE — PROGRESS NOTES
Subjective:       Patient ID: Shaina Mas is a 37 y.o. female.    Chief Complaint: Health maintenance examination [Z00.00]    Patient is new to me, here to establish care.    Health maintenance -   Denies family history of colorectal cancer.  Denies family history of breast cancer.  Denies family history of ovarian cancer.  Last pap performed JUNE2022.   Denies history of abnormal pap smear.  Denies family history of osteoporosis.  Denies significant family history of cardiac disease.  UTD on COVID primary/booster vaccinations.  Due for influenza, Tdap, COVID bivalent vaccinations.  Unsure HPV vaccination status.   Started smoking at age [age], rarely at first, at age 36 at most <0.5 PPD. Stopped smoking for Ramadan.  Drinks alcohol 3-4 times monthly, 1-2 drinks per sitting.  Denies drug use.  Completed HIV and hepatitis C screening.  UTD on lipid screening.  Lab Results       Component                Value               Date                       LDLCALC                  91.6                12/29/2022            Endorses overall healthy diet.   Eating plenty of fresh vegetables, fruits.  Eating mostly lean proteins.   Eats mostly at home.  Not currently exercising routinely.  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  Originally from [facility].     Has regular menstrual cycles monthly.  Menstruation lasts for 5-7 days.  Endorses menorrhagia on days 3-5.  Began menarche at age [age].   Never previously pregnant.   Currently sexually active with male partner.  Currently using patches for contraception.   Agreeable to routine STI testing.     Endorses heavy, irregular menses when not on hormonal contraception  Endorses hirsutism and difficulty with weight gain   Interested in conceiving in the future     Prediabetes -   UTD on diabetes screening.  Lab Results       Component                Value               Date                       HGBA1C                   6.2 (H)             12/29/2022               Environmental allergies -   Currently requiring albuterol inhaler a couple times really.  Allergy flares occurring with seasonal changes.  Takes Singulair nightly PRN.  Takes Flonase PRN   Not routinely taking antihistamine     RBC's microcytic on recent labs  Denies known personal history of thalassemia or sickle cell   Unsure family history of thalassemia or sickle cell     Review labs from DEC2022 today     Review of Systems   Constitutional:  Positive for unexpected weight change. Negative for appetite change, chills, fatigue and fever.   Respiratory:  Negative for cough and shortness of breath.    Cardiovascular:  Negative for chest pain, palpitations and leg swelling.   Gastrointestinal:  Negative for abdominal pain, constipation, diarrhea, nausea and vomiting.   Skin:  Negative for rash.   Neurological:  Negative for dizziness, syncope, weakness and headaches.       Current Outpatient Medications   Medication Instructions    albuterol (PROVENTIL/VENTOLIN HFA) 90 mcg/actuation inhaler 2 puffs, Inhalation, Every 6 hours PRN, Rescue    ammonium lactate 12 % Crea Apply twice daily to affected parts both feet as needed.    fluticasone propionate (FLONASE) 50 mcg, Each Nostril, 2 times daily    montelukast (SINGULAIR) 10 mg tablet TAKE 1 TABLET BY MOUTH EVERY DAY IN THE EVENING    mv-min/iron/folic/calcium/vitK (WOMEN'S MULTIVITAMIN ORAL) Oral    norelgestromin-ethinyl estradiol 150-35 mcg/24 hr 1 patch, Transdermal, Weekly, CHANGE PATCH Q WEEK X 3 WEEKS THEN ONE WEEK OFF WITH NO PATCH     Objective:      Vitals:    03/30/23 1409   BP: 122/78   Pulse: 80   SpO2: 98%   Weight: 131.9 kg (290 lb 12.6 oz)   PainSc: 0-No pain     Body mass index is 46.93 kg/m².    Physical Exam  Vitals reviewed.   Constitutional:       General: She is not in acute distress.     Appearance: Normal appearance. She is not ill-appearing or diaphoretic.   HENT:      Head: Normocephalic and atraumatic.      Right Ear: Tympanic membrane,  ear canal and external ear normal. There is no impacted cerumen.      Left Ear: Tympanic membrane, ear canal and external ear normal. There is no impacted cerumen.      Nose: Nose normal. No rhinorrhea.      Mouth/Throat:      Mouth: Mucous membranes are moist.      Pharynx: Oropharynx is clear. No oropharyngeal exudate or posterior oropharyngeal erythema.   Eyes:      General: No scleral icterus.        Right eye: No discharge.         Left eye: No discharge.      Conjunctiva/sclera: Conjunctivae normal.   Neck:      Thyroid: No thyromegaly or thyroid tenderness.      Trachea: Trachea normal.   Cardiovascular:      Rate and Rhythm: Normal rate and regular rhythm.      Heart sounds: Normal heart sounds. No murmur heard.    No friction rub. No gallop.   Pulmonary:      Effort: Pulmonary effort is normal. No respiratory distress.      Breath sounds: Normal breath sounds. No stridor. No wheezing, rhonchi or rales.   Abdominal:      General: There is no distension.      Palpations: Abdomen is soft.      Tenderness: There is no abdominal tenderness. There is no guarding or rebound.   Musculoskeletal:         General: No swelling or deformity.      Cervical back: Neck supple.   Lymphadenopathy:      Head:      Right side of head: No submandibular or posterior auricular adenopathy.      Left side of head: No submandibular or posterior auricular adenopathy.      Cervical: No cervical adenopathy.      Right cervical: No superficial, deep or posterior cervical adenopathy.     Left cervical: No superficial, deep or posterior cervical adenopathy.      Upper Body:      Right upper body: No supraclavicular adenopathy.      Left upper body: No supraclavicular adenopathy.   Skin:     General: Skin is warm and dry.   Neurological:      General: No focal deficit present.      Mental Status: She is alert. Mental status is at baseline.      Gait: Gait normal.   Psychiatric:         Mood and Affect: Mood normal.         Behavior:  Behavior normal.       Assessment:       1. Health maintenance examination    2. Prediabetes    3. Environmental allergies    4. Hirsutism    5. Exposure to sexually transmitted disease (STD)    6. Encounter for preconception consultation        Plan:       Prediabetes  Referral for nutrition counseling  RTC in 4-6 weeks for weight reduction counseling.  -     Ambulatory referral/consult to Diabetes Education; Future    Environmental allergies  Continue current medications.  RTC in 4-6 weeks for follow up.    Hirsutism  Evaluation for PCOS  Check [facility] for ovarian reserve   -     TESTOSTERONE PANEL; Future  -     HCG, Quantitative; Future  -     PROLACTIN; Future  -     FOLLICLE STIMULATING HORMONE; Future  -     US Transvaginal Non OB; Future  -     ANTIMULLERIAN HORMONE ([facility]); Future  -     ESTRADIOL; Future    Health maintenance examination  Reviewed and discussed age appropriate screenings and immunizations.  -     TESTOSTERONE PANEL; Future  -     HCG, Quantitative; Future  -     PROLACTIN; Future  -     FOLLICLE STIMULATING HORMONE; Future  -     RPR; Future  -     Hepatitis B Surface Antigen; Future  -     C. trachomatis/N. gonorrhoeae by AMP DNA [facility]; Urine; Future    Exposure to sexually transmitted disease (STD)  -     RPR; Future  -     Hepatitis B Surface Antigen; Future  -     C. trachomatis/N. gonorrhoeae by AMP DNA [facility]; Urine; Future    Encounter for preconception consultation  -     Ambulatory referral/consult to Obstetrics / Gynecology; Future        Bernadette Reich MD  3/30/2023

## 2023-03-31 LAB
ESTRADIOL SERPL-MCNC: 39 PG/ML
FSH SERPL-ACNC: 2.86 MIU/ML
HBV SURFACE AG SERPL QL IA: NORMAL
PROLACTIN SERPL IA-MCNC: 6.7 NG/ML (ref 5.2–26.5)

## 2023-04-01 LAB — RPR SER QL: NORMAL

## 2023-04-03 LAB — MIS SERPL-MCNC: 1.1 NG/ML (ref 0.15–7.5)

## 2023-04-04 ENCOUNTER — OFFICE VISIT (OUTPATIENT)
Dept: OBSTETRICS AND GYNECOLOGY | Facility: CLINIC | Age: 38
End: 2023-04-04
Attending: OBSTETRICS & GYNECOLOGY
Payer: COMMERCIAL

## 2023-04-04 VITALS
HEIGHT: 66 IN | BODY MASS INDEX: 46.56 KG/M2 | SYSTOLIC BLOOD PRESSURE: 100 MMHG | WEIGHT: 289.69 LBS | DIASTOLIC BLOOD PRESSURE: 60 MMHG

## 2023-04-04 DIAGNOSIS — L68.0 HIRSUTISM: ICD-10-CM

## 2023-04-04 DIAGNOSIS — Z31.69 ENCOUNTER FOR PRECONCEPTION CONSULTATION: ICD-10-CM

## 2023-04-04 DIAGNOSIS — R73.03 PREDIABETES: ICD-10-CM

## 2023-04-04 DIAGNOSIS — N93.9 ABNORMAL UTERINE BLEEDING: Primary | ICD-10-CM

## 2023-04-04 PROCEDURE — 3008F PR BODY MASS INDEX (BMI) DOCUMENTED: ICD-10-PCS | Mod: CPTII,S$GLB,, | Performed by: OBSTETRICS & GYNECOLOGY

## 2023-04-04 PROCEDURE — 99999 PR PBB SHADOW E&M-EST. PATIENT-LVL IV: ICD-10-PCS | Mod: PBBFAC,,, | Performed by: OBSTETRICS & GYNECOLOGY

## 2023-04-04 PROCEDURE — 3078F PR MOST RECENT DIASTOLIC BLOOD PRESSURE < 80 MM HG: ICD-10-PCS | Mod: CPTII,S$GLB,, | Performed by: OBSTETRICS & GYNECOLOGY

## 2023-04-04 PROCEDURE — 1160F PR REVIEW ALL MEDS BY PRESCRIBER/CLIN PHARMACIST DOCUMENTED: ICD-10-PCS | Mod: CPTII,S$GLB,, | Performed by: OBSTETRICS & GYNECOLOGY

## 2023-04-04 PROCEDURE — 99999 PR PBB SHADOW E&M-EST. PATIENT-LVL IV: CPT | Mod: PBBFAC,,, | Performed by: OBSTETRICS & GYNECOLOGY

## 2023-04-04 PROCEDURE — 1159F PR MEDICATION LIST DOCUMENTED IN MEDICAL RECORD: ICD-10-PCS | Mod: CPTII,S$GLB,, | Performed by: OBSTETRICS & GYNECOLOGY

## 2023-04-04 PROCEDURE — 99214 PR OFFICE/OUTPT VISIT, EST, LEVL IV, 30-39 MIN: ICD-10-PCS | Mod: S$GLB,,, | Performed by: OBSTETRICS & GYNECOLOGY

## 2023-04-04 PROCEDURE — 3074F PR MOST RECENT SYSTOLIC BLOOD PRESSURE < 130 MM HG: ICD-10-PCS | Mod: CPTII,S$GLB,, | Performed by: OBSTETRICS & GYNECOLOGY

## 2023-04-04 PROCEDURE — 1160F RVW MEDS BY RX/DR IN RCRD: CPT | Mod: CPTII,S$GLB,, | Performed by: OBSTETRICS & GYNECOLOGY

## 2023-04-04 PROCEDURE — 3008F BODY MASS INDEX DOCD: CPT | Mod: CPTII,S$GLB,, | Performed by: OBSTETRICS & GYNECOLOGY

## 2023-04-04 PROCEDURE — 99214 OFFICE O/P EST MOD 30 MIN: CPT | Mod: S$GLB,,, | Performed by: OBSTETRICS & GYNECOLOGY

## 2023-04-04 PROCEDURE — 3074F SYST BP LT 130 MM HG: CPT | Mod: CPTII,S$GLB,, | Performed by: OBSTETRICS & GYNECOLOGY

## 2023-04-04 PROCEDURE — 1159F MED LIST DOCD IN RCRD: CPT | Mod: CPTII,S$GLB,, | Performed by: OBSTETRICS & GYNECOLOGY

## 2023-04-04 PROCEDURE — 3078F DIAST BP <80 MM HG: CPT | Mod: CPTII,S$GLB,, | Performed by: OBSTETRICS & GYNECOLOGY

## 2023-04-04 NOTE — PROGRESS NOTES
SUBJECTIVE:   37 y.o. female   for AUB. Patient's last menstrual period was 2023..  Saw PCP last week to establish care. She reports menarche at age 20. Periods were irregular- every 1-3 months.  Then in 2018-- periods became irregular in that had bleeding for 90 days straight.  Went to ED for care and was told it was stress-induced.  Then periods eventually became monthly.  Started orthoevra patch  for contraception. Had regular periods on the patch. Has fatigue before period starts.  Unable to lose weight.  Has hair on chin.  Concerned that she has PCOS. PCP ordered labs and a pelvic u/s  Told she has pre-diabetes  She was told in the past that she would need tx to get pregnant. Does not want to get pregnant at this time.         Past Medical History:   Diagnosis Date    Prediabetes     Smoker      Past Surgical History:   Procedure Laterality Date    NO PAST SURGERIES       Social History     Socioeconomic History    Marital status: Unknown   Tobacco Use    Smoking status: Former     Packs/day: 0.50     Years: 1.00     Pack years: 0.50     Types: Cigarettes    Smokeless tobacco: Never   Substance and Sexual Activity    Alcohol use: Not Currently    Drug use: Never    Sexual activity: Yes     Partners: Male     Birth control/protection: None, Patch   Social History Narrative    ** Merged History Encounter **          Family History   Problem Relation Age of Onset    Hypertension Mother     Diabetes Father     Hyperlipidemia Father     Hypertension Father     Allergies Brother     Osteoarthritis Brother     Allergies Brother     Anemia Maternal Grandmother     Cancer Maternal Grandfather     Diabetes Paternal Grandmother     Early death Paternal Grandfather     Colon cancer Neg Hx     Breast cancer Neg Hx     Ovarian cancer Neg Hx     Heart attack Neg Hx     Stroke Neg Hx     Osteoporosis Neg Hx      OB History    Para Term  AB Living   0 0 0 0 0 0   SAB IAB Ectopic Multiple Live  Births   0 0 0 0 0         Current Outpatient Medications   Medication Sig Dispense Refill    albuterol (PROVENTIL/VENTOLIN HFA) 90 mcg/actuation inhaler Inhale 2 puffs into the lungs every 6 (six) hours as needed for Wheezing or Shortness of Breath. Rescue 18 g 0    ammonium lactate 12 % Crea Apply twice daily to affected parts both feet as needed. 140 g 11    fluticasone propionate (FLONASE) 50 mcg/actuation nasal spray 1 spray (50 mcg total) by Each Nostril route 2 (two) times daily. 16 g 3    montelukast (SINGULAIR) 10 mg tablet TAKE 1 TABLET BY MOUTH EVERY DAY IN THE EVENING 30 tablet 0    mv-min/iron/folic/calcium/vitK (WOMEN'S MULTIVITAMIN ORAL) Take by mouth.      norelgestromin-ethinyl estradiol 150-35 mcg/24 hr Place 1 patch onto the skin once a week. CHANGE PATCH Q WEEK X 3 WEEKS THEN ONE WEEK OFF WITH NO PATCH 3 patch 3     No current facility-administered medications for this visit.     Allergies: Patient has no known allergies.     ROS:  Constitutional: no weight loss, weight gain, fever, fatigue  Eyes:  No vision changes, glasses/contacts  ENT/Mouth: No ulcers, sinus problems, ears ringing, headache  Cardiovascular: No inability to lie flat, chest pain, exercise intolerance, swelling, heart palpitations  Respiratory: No wheezing, coughing blood, shortness of breath, or cough  Gastrointestinal: No diarrhea, bloody stool, nausea/vomiting, constipation, gas, hemorrhoids  Genitourinary: No blood in urine, painful urination, urgency of urination, frequency of urination, incomplete emptying, incontinence, +abnormal bleeding, painful periods, heavy periods, vaginal discharge, vaginal odor, painful intercourse, sexual problems, bleeding after intercourse.  Musculoskeletal: No muscle weakness  Skin/Breast: No painful breasts, nipple discharge, masses, rash, ulcers  Neurological: No passing out, seizures, numbness, headache  Endocrine: +diabetes, no hypothyroid, hyperthyroid, hot flashes, hair loss, +abnormal  "hair growth, acne  Psychiatric: No depression, crying  Hematologic: No bruises, bleeding, swollen lymph nodes, anemia.      OBJECTIVE:   The patient appears well, alert, oriented x 3, in no distress.  /60 (BP Location: Right arm, Patient Position: Sitting, BP Method: Large (Manual))   Ht 5' 6" (1.676 m)   Wt 131.4 kg (289 lb 11 oz)   LMP 04/03/2023   BMI 46.76 kg/m²       ASSESSMENT:   1. Abnormal uterine bleeding        2. BMI 45.0-49.9, adult  Ambulatory referral/consult to Bariatric Medicine      3. Prediabetes  Ambulatory referral/consult to Bariatric Medicine      4. Hirsutism        5. Encounter for preconception consultation  Ambulatory referral/consult to Obstetrics / Gynecology          PLAN:   Orders Placed This Encounter    Ambulatory referral/consult to Bariatric Medicine     Discussed AUB, sx of PCOS.  Does not want pregnancy at this time. May continue hormonal contraception. This will help with cycle control and also lower circulating testosterone. Could consider adding spironolactone for hirsutism. Awaiting testosterone lab  Discussed weight loss. Bariatric medicine consult  Diabetic teaching.  Consider consult with Dr. Finley for PCOS  Return to clinic prn    "

## 2023-04-06 LAB
ALBUMIN SERPL-MCNC: 3.8 G/DL (ref 3.6–5.1)
SHBG SERPL-SCNC: 127 NMOL/L (ref 17–124)
TESTOST FREE SERPL-MCNC: 0.9 PG/ML (ref 0.2–5)
TESTOST SERPL-MCNC: 24 NG/DL (ref 2–45)
TESTOSTERONE.FREE+WB SERPL-MCNC: 1.6 NG/DL (ref 0.5–8.5)

## 2023-04-08 ENCOUNTER — HOSPITAL ENCOUNTER (OUTPATIENT)
Dept: RADIOLOGY | Facility: OTHER | Age: 38
Discharge: HOME | End: 2023-04-08
Attending: STUDENT IN AN ORGANIZED HEALTH CARE EDUCATION/TRAINING PROGRAM
Payer: COMMERCIAL

## 2023-04-08 DIAGNOSIS — L68.0 HIRSUTISM: ICD-10-CM

## 2023-04-08 PROCEDURE — 76856 US EXAM PELVIC COMPLETE: CPT | Mod: 26,,, | Performed by: RADIOLOGY

## 2023-04-08 PROCEDURE — 76830 US PELVIS COMP WITH TRANSVAG NON-OB (XPD): ICD-10-PCS | Mod: 26,,, | Performed by: RADIOLOGY

## 2023-04-08 PROCEDURE — 76856 US PELVIS COMP WITH TRANSVAG NON-OB (XPD): ICD-10-PCS | Mod: 26,,, | Performed by: RADIOLOGY

## 2023-04-08 PROCEDURE — 76830 TRANSVAGINAL US NON-OB: CPT | Mod: 26,,, | Performed by: RADIOLOGY

## 2023-04-08 PROCEDURE — 76856 US EXAM PELVIC COMPLETE: CPT | Mod: TC

## 2023-04-11 ENCOUNTER — CLINICAL SUPPORT (OUTPATIENT)
Dept: DIABETES | Facility: CLINIC | Age: 38
End: 2023-04-11
Payer: COMMERCIAL

## 2023-04-11 DIAGNOSIS — R73.03 PREDIABETES: ICD-10-CM

## 2023-04-11 PROCEDURE — 99999 PR PBB SHADOW E&M-EST. PATIENT-LVL II: CPT | Mod: PBBFAC,,,

## 2023-04-11 PROCEDURE — G0108 DIAB MANAGE TRN  PER INDIV: HCPCS | Mod: S$GLB,,, | Performed by: FAMILY MEDICINE

## 2023-04-11 PROCEDURE — 99999 PR PBB SHADOW E&M-EST. PATIENT-LVL II: ICD-10-PCS | Mod: PBBFAC,,,

## 2023-04-11 PROCEDURE — G0108 PR DIAB MANAGE TRN  PER INDIV: ICD-10-PCS | Mod: S$GLB,,, | Performed by: FAMILY MEDICINE

## 2023-04-13 VITALS — BODY MASS INDEX: 46.81 KG/M2 | WEIGHT: 291.25 LBS | HEIGHT: 66 IN

## 2023-04-13 NOTE — PROGRESS NOTES
Diabetes Care Specialist Progress Note  Author: Bambi Hall RD, CDE  Date: 4/13/2023    Program Intake  Reason for Diabetes Program Visit:: Initial Diabetes Assessment  Current diabetes risk level:: moderate  In the last 12 months, have you:: none    Lab Results   Component Value Date    HGBA1C 6.2 (H) 12/29/2022       Clinical    Patient Health Rating  Compared to other people your age, how would you rate your health?: Good    Problem Review  Reviewed Problem List with Patient: yes  Active comorbidities affecting diabetes self-care.: no    Clinical Assessment  Current Diabetes Treatment: Diet (not currently on any glucose lowering medication (prediabetes))  Have you ever experienced hypoglycemia (low blood sugar)?: no  Have you ever experienced hyperglycemia (high blood sugar)?: no         Labs  Do you have regular lab work to monitor your medications?: Yes  Type of Regular Lab Work: A1c, Cholesterol, Microalbumin, CBC, BMP, Other  Where do you get your labs drawn?: [facility]  Lab Compliance Barriers: No    Nutritional Status  Diet: Regular  Meal Plan 24 Hour Recall: Breakfast, Lunch, Dinner, Snack  Meal Plan 24 Hour Recall - Breakfast: coffee or tea (black, no sweetener)  Meal Plan 24 Hour Recall - Lunch: ~noon: school lunch or brings lunch (pasta with meat sauce and salad or broccoli), water or sweet tea  Meal Plan 24 Hour Recall - Dinner: flatbread with meat and salad, water or sweet tea  Meal Plan 24 Hour Recall - Snack: ~10am: cookies or croissant  Change in appetite?: No  Recent Changes in Weight: Weight Gain    Additional Social History    Support  Does anyone support you with your diabetes care?: yes  Who supports you?: significant other  Who takes you to your medical appointments?: self  Does the current support meet the patient's needs?: Yes  Is Support an area impacting ability to self-manage diabetes?: No    Access to [employer]  Does the patient have access to any of the following  devices or technologies?: Smart phone, Internet Access  Media or technology needs impacting ability to self-manage diabetes?: No    Cognitive/Behavioral Health  Alert and Oriented: Yes  Difficulty Thinking: No  Requires Prompting: No  Requires assistance for routine expression?: No  Cognitive or behavioral barriers impacting ability to self-manage diabetes?: No    Culture/Congregation  Culture or Christianity beliefs that may impact ability to access healthcare: Yes  If yes:: Bahai, Other (Currently fasting for Ramadan)    Communication  Language preference: Other (ESL but speaks and understands English very well - she is a )  Hearing Problems: No  Vision Problems: No  Communication needs impacting ability to self-manage diabetes?: No    Health Literacy  Preferred Learning Method: Face to Face, Reading Materials  How often do you need to have someone help you read instructions, pamphlets, or written material from your doctor or pharmacy?: Never  Health literacy needs impacting ability to self-manage diabetes?: No      Diabetes Self-Management Skills Assessment    Diabetes Disease Process/Treatment Options  Patient/caregiver able to state what happens when someone has diabetes.: somewhat  Patient/caregiver knows what type of diabetes they have.: yes  Diabetes Type : Pre-Diabetes  Patient/caregiver able to identify at least three signs and symptoms of diabetes.: no  Patient able to identify at least three risk factors for diabetes.: no  Diabetes Disease Process/Treatment Options: Skills Assessment Completed: Yes  Assessment indicates:: Instruction Needed, Knowledge deficit  Area of need?: Yes    Nutrition/Healthy Eating  Challenges to healthy eating:: portion control  Method of carbohydrate measurement:: eyeballing/guessing  Patient can identify foods that impact blood sugar.: yes  Patient-identified foods:: starches (bread, pasta, rice, cereal), soda, sweets  Nutrition/Healthy Eating Skills Assessment  Completed:: Yes  Assessment indicates:: Instruction Needed  Area of need?: Yes    Physical Activity/Exercise  Patient's daily activity level:: 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)  Patient formally exercises outside of work.: no  Reasons for not exercising:: time constraints  Patient can identify forms of physical activity.: yes  Stated forms of physical activity:: any movement performed by muscles that uses energy, manual activity at work  Patient can identify reasons why exercise/physical activity is important in diabetes management.: yes  Identified reasons:: lowers risk of heart disease and stroke, improves blood circulation, strengthens heart, muscles, and bones  Physical Activity/Exercise Skills Assessment Completed: : Yes  Assessment indicates:: Instruction Needed  Area of need?: Yes    Medications  Medication Skills Assessment Completed:: No  Deffered due to:: Other (comment) (not on any dm medications)    Home Blood Glucose Monitoring  Home Blood Glucose Monitoring Skills Assessment Completed: : No  Deferred due to:: Other (comment) (pt has prediabetes)    Acute Complications  Acute Complications Skills Assessment Completed: : No  Deffered due to:: Time    Chronic Complications  Chronic Complications Skills Assessment Completed: : No  Deferred due to:: Time    Psychosocial/Coping  Psychosocial/Coping Skills Assessment Completed: : No  Deffered due to:: Time      Diabetes Self Support Plan         Assessment Summary and Plan    Based on today's diabetes care assessment, the following areas of need were identified:      Social 4/11/2023   Support No   Access to Mass Media/Tech No   Cognitive/Behavioral Health No   Culture/Pentecostalism Yes   Communication No   Health Literacy No        Clinical 4/11/2023   Lab Compliance No        Diabetes Self-Management Skills 4/11/2023   Diabetes Disease Process/Treatment Options Yes - Pt has prediabetes and noted being assessed by MD for possible PCOS. Discussed  hallmark of insulin resistance and lifestyle factors that play a role in reducing insulin resistance.    Nutrition/Healthy Eating Yes - see care planning   Physical Activity/Exercise Yes - see care planning          Today's interventions were provided through individual discussion, instruction, and written materials were provided.      Patient verbalized understanding of instruction and written materials.  Pt was able to return back demonstration of instructions today. Patient understood key points, needs reinforcement and further instruction.     Diabetes Self-Management Care Plan:    Today's Diabetes Self-Management Care Plan was developed with Shaina's input. Shaina has agreed to work toward the following goal(s) to improve his/her overall diabetes control.      Care Plan: Diabetes Management   Updates made since 3/14/2023 12:00 AM        Problem: Healthy Eating         Goal: Pt will swap sweet tea for unsweet.    Start Date: 4/11/2023   Expected End Date: 5/13/2023   Priority: High   Barriers: No Barriers Identified   Note:    4/11/23 - Pt has a consistent eating pattern with eating 3 times daily, does not snack, drinks a lot of water, and incorporates non-starchy vegetables. She does not like to cook, uses some convenience food items, and does minimal meal prep. Boyfriend does some cooking for dinner. Morning meal/snack is usually something quick at school (cookies or croissant if she stopped by [employer]) - discussed some quick breakfast choices that she could prepare at school and will try to move up meal to ~8 or 9 am. Lunch is either school lunch or brought from home (50/50). If she brings lunch from home, it is usually bigger portion of pasta and vegetables. Dinner is usually protein, starch, vegetable meal. Provided education on sources of CHO, choosing more vegetables/whole grains/fresh produce, portion sizes using dry measuring cups/food models/fist size for visual aid, plate method, and label reading.  Discussed and reviewed examples of meal planning. Does drink some bottled sweet tea - likes brewed tea without sugar - discussed and encouraged swapping with unsweet or diet.        Task: Reviewed the sources and role of Carbohydrate, Protein, and Fat and how each nutrient impacts blood sugar. Completed 4/13/2023        Task: Provided visual examples using dry measuring cups, food models, and other familiar objects such as computer mouse, deck or cards, tennis ball etc. to help with visualization of portions. Completed 4/13/2023        Task: Explained how to count carbohydrates using the food label and the use of dry measuring cups for accurate carb counting. Completed 4/13/2023        Task: Discussed strategies for choosing healthier menu options when dining out. Completed 4/13/2023        Task: Recommended replacing beverages containing high sugar content with noncaloric/sugar free options and/or water. Completed 4/13/2023        Task: Review the importance of balancing carbohydrates with each meal using portion control techniques to count servings of carbohydrate and label reading to identify serving size and amount of total carbs per serving. Completed 4/13/2023        Task: Provided Sample plate method and reviewed the use of the plate to estimate amounts of carbohydrate per meal. Completed 4/13/2023        Problem: Physical Activity and Exercise         Goal: Pt will start with adding at least 5 minutes of enjoyable movement daily.    Start Date: 4/11/2023   Expected End Date: 5/13/2023   Priority: Medium   Barriers: No Barriers Identified   Note:    4/11/23 - Pt is active at work but no other activities in the evening or weekends. Discussed benefits of regular physical activity on reducing insulin resistance. Discussed some ideas to increase PA.        Task: Discussed role of physical activity on reducing insulin resistance and improvement in overall glycemic control. Completed 4/13/2023        Task:  Discussed role of physical activity as it relates to weight loss Completed 4/13/2023        Task: Offered suggestions on how patient could increase their regular physical activity Completed 4/13/2023          Follow Up Plan     Follow up in about 1 month (around 5/11/2023) for diabetes education follow-up.    Today's care plan and follow up schedule was discussed with patient.  Shaina verbalized understanding of the care plan, goals, and agrees to follow up plan.        The patient was encouraged to communicate with his/her health care provider/physician and care team regarding his/her condition(s) and treatment.  I provided the patient with my contact information today and encouraged to contact me via phone or [facility] Portal as needed.     Length of Visit   Total Time: 60 Minutes

## 2023-04-19 ENCOUNTER — PATIENT MESSAGE (OUTPATIENT)
Dept: RESEARCH | Facility: HOSPITAL | Age: 38
End: 2023-04-19
Payer: COMMERCIAL

## 2023-05-11 ENCOUNTER — OFFICE VISIT (OUTPATIENT)
Dept: INTERNAL MEDICINE | Facility: CLINIC | Age: 38
End: 2023-05-11
Payer: COMMERCIAL

## 2023-05-11 ENCOUNTER — PATIENT MESSAGE (OUTPATIENT)
Dept: DIABETES | Facility: CLINIC | Age: 38
End: 2023-05-11
Payer: COMMERCIAL

## 2023-05-11 VITALS
HEART RATE: 124 BPM | SYSTOLIC BLOOD PRESSURE: 138 MMHG | BODY MASS INDEX: 47.09 KG/M2 | TEMPERATURE: 99 F | WEIGHT: 291.75 LBS | DIASTOLIC BLOOD PRESSURE: 75 MMHG | OXYGEN SATURATION: 99 %

## 2023-05-11 DIAGNOSIS — J06.9 UPPER RESPIRATORY TRACT INFECTION, UNSPECIFIED TYPE: Primary | ICD-10-CM

## 2023-05-11 DIAGNOSIS — R05.1 ACUTE COUGH: ICD-10-CM

## 2023-05-11 LAB
CTP QC/QA: YES
POC MOLECULAR INFLUENZA A AGN: NEGATIVE
POC MOLECULAR INFLUENZA B AGN: NEGATIVE

## 2023-05-11 PROCEDURE — 87502 POCT INFLUENZA A/B MOLECULAR: ICD-10-PCS | Mod: QW,S$GLB,, | Performed by: STUDENT IN AN ORGANIZED HEALTH CARE EDUCATION/TRAINING PROGRAM

## 2023-05-11 PROCEDURE — 87635 SARS-COV-2 COVID-19 AMP PRB: CPT | Performed by: STUDENT IN AN ORGANIZED HEALTH CARE EDUCATION/TRAINING PROGRAM

## 2023-05-11 PROCEDURE — 3078F DIAST BP <80 MM HG: CPT | Mod: CPTII,S$GLB,, | Performed by: STUDENT IN AN ORGANIZED HEALTH CARE EDUCATION/TRAINING PROGRAM

## 2023-05-11 PROCEDURE — 99999 PR PBB SHADOW E&M-EST. PATIENT-LVL III: CPT | Mod: PBBFAC,,, | Performed by: STUDENT IN AN ORGANIZED HEALTH CARE EDUCATION/TRAINING PROGRAM

## 2023-05-11 PROCEDURE — 99213 OFFICE O/P EST LOW 20 MIN: CPT | Mod: S$GLB,,, | Performed by: STUDENT IN AN ORGANIZED HEALTH CARE EDUCATION/TRAINING PROGRAM

## 2023-05-11 PROCEDURE — 99213 PR OFFICE/OUTPT VISIT, EST, LEVL III, 20-29 MIN: ICD-10-PCS | Mod: S$GLB,,, | Performed by: STUDENT IN AN ORGANIZED HEALTH CARE EDUCATION/TRAINING PROGRAM

## 2023-05-11 PROCEDURE — 3075F SYST BP GE 130 - 139MM HG: CPT | Mod: CPTII,S$GLB,, | Performed by: STUDENT IN AN ORGANIZED HEALTH CARE EDUCATION/TRAINING PROGRAM

## 2023-05-11 PROCEDURE — 3078F PR MOST RECENT DIASTOLIC BLOOD PRESSURE < 80 MM HG: ICD-10-PCS | Mod: CPTII,S$GLB,, | Performed by: STUDENT IN AN ORGANIZED HEALTH CARE EDUCATION/TRAINING PROGRAM

## 2023-05-11 PROCEDURE — 87502 INFLUENZA DNA AMP PROBE: CPT | Mod: QW,S$GLB,, | Performed by: STUDENT IN AN ORGANIZED HEALTH CARE EDUCATION/TRAINING PROGRAM

## 2023-05-11 PROCEDURE — 3008F PR BODY MASS INDEX (BMI) DOCUMENTED: ICD-10-PCS | Mod: CPTII,S$GLB,, | Performed by: STUDENT IN AN ORGANIZED HEALTH CARE EDUCATION/TRAINING PROGRAM

## 2023-05-11 PROCEDURE — 3075F PR MOST RECENT SYSTOLIC BLOOD PRESS GE 130-139MM HG: ICD-10-PCS | Mod: CPTII,S$GLB,, | Performed by: STUDENT IN AN ORGANIZED HEALTH CARE EDUCATION/TRAINING PROGRAM

## 2023-05-11 PROCEDURE — 3008F BODY MASS INDEX DOCD: CPT | Mod: CPTII,S$GLB,, | Performed by: STUDENT IN AN ORGANIZED HEALTH CARE EDUCATION/TRAINING PROGRAM

## 2023-05-11 PROCEDURE — 99999 PR PBB SHADOW E&M-EST. PATIENT-LVL III: ICD-10-PCS | Mod: PBBFAC,,, | Performed by: STUDENT IN AN ORGANIZED HEALTH CARE EDUCATION/TRAINING PROGRAM

## 2023-05-11 RX ORDER — ALBUTEROL SULFATE 90 UG/1
2 AEROSOL, METERED RESPIRATORY (INHALATION) EVERY 6 HOURS PRN
Qty: 18 G | Refills: 2 | Status: SHIPPED | OUTPATIENT
Start: 2023-05-11

## 2023-05-11 RX ORDER — PROMETHAZINE HYDROCHLORIDE AND DEXTROMETHORPHAN HYDROBROMIDE 6.25; 15 MG/5ML; MG/5ML
5 SYRUP ORAL EVERY 6 HOURS PRN
Qty: 120 ML | Refills: 0 | Status: SHIPPED | OUTPATIENT
Start: 2023-05-11 | End: 2023-05-21

## 2023-05-11 RX ORDER — BENZONATATE 100 MG/1
100 CAPSULE ORAL 3 TIMES DAILY PRN
Qty: 30 CAPSULE | Refills: 0 | Status: SHIPPED | OUTPATIENT
Start: 2023-05-11 | End: 2023-06-06 | Stop reason: SDUPTHER

## 2023-05-11 RX ORDER — AZELASTINE 1 MG/ML
2 SPRAY, METERED NASAL 2 TIMES DAILY
Qty: 30 ML | Refills: 2 | Status: SHIPPED | OUTPATIENT
Start: 2023-05-11 | End: 2023-06-13

## 2023-05-11 NOTE — PROGRESS NOTES
Subjective:       Patient ID: Shaina Mas is a 37 y.o. female.    Chief Complaint: Upper respiratory tract infection, unspecified type [J06.9]    Patient is established with me, here today for the following:    Monday started with cough  Has slight congestion prior, but not significant  Felt body aches, chills, headaches on Tuesday  Endorses rhinorrhea, sinus congestion, sinus pressure, body aches, wheezing, cough, chills, headache, and diarrhea.  Denies SOB, CP, nausea, vomiting.   Cough productive of mucus.  Has tried ibuprofen, Nyquil, Dayquil without significant improvement.  Tolerating PO intake without difficulty.   Endorses boyfriend was sick with similar illness recently.   Also works in a school.   Feels similar to prior 3 times she'd had COVID  Review of Systems   Constitutional:  Positive for chills and fever.   HENT:  Positive for congestion, postnasal drip, rhinorrhea, sinus pressure and sore throat.    Respiratory:  Positive for cough. Negative for shortness of breath.    Cardiovascular:  Negative for chest pain and palpitations.   Gastrointestinal:  Positive for diarrhea. Negative for abdominal pain, nausea and vomiting.   Neurological:  Positive for headaches.       Current Outpatient Medications   Medication Instructions    albuterol (PROVENTIL/VENTOLIN HFA) 90 mcg/actuation inhaler 2 puffs, Inhalation, Every 6 hours PRN, Rescue    ammonium lactate 12 % Crea Apply twice daily to affected parts both feet as needed.    fluticasone propionate (FLONASE) 50 mcg, Each Nostril, 2 times daily    montelukast (SINGULAIR) 10 mg tablet TAKE 1 TABLET BY MOUTH EVERY DAY IN THE EVENING    mv-min/iron/folic/calcium/vitK (WOMEN'S MULTIVITAMIN ORAL) Oral    norelgestromin-ethinyl estradiol 150-35 mcg/24 hr 1 patch, Transdermal, Weekly, CHANGE PATCH Q WEEK X 3 WEEKS THEN ONE WEEK OFF WITH NO PATCH     Objective:      Vitals:    05/11/23 1404   BP: 138/75   Pulse: (!) 124   Temp: 99.4 °F (37.4 °C)   SpO2: 99%    Weight: 132.3 kg (291 lb 12.5 oz)   PainSc:   4     Body mass index is 47.09 kg/m².    Physical Exam  Vitals reviewed.   Constitutional:       General: She is not in acute distress.     Appearance: Normal appearance. She is ill-appearing. She is not diaphoretic.   HENT:      Head: Normocephalic and atraumatic.      Right Ear: Tympanic membrane, ear canal and external ear normal. There is no impacted cerumen.      Left Ear: Tympanic membrane, ear canal and external ear normal. There is no impacted cerumen.      Nose: Congestion and rhinorrhea present. Rhinorrhea is clear.      Right Nostril: No foreign body, epistaxis, septal hematoma or occlusion.      Left Nostril: No foreign body, epistaxis, septal hematoma or occlusion.      Right Turbinates: Swollen. Not pale.      Left Turbinates: Swollen. Not pale.      Mouth/Throat:      Mouth: Mucous membranes are moist.      Pharynx: Oropharynx is clear. Posterior oropharyngeal erythema (moderate, posterior oropharynx) present. No pharyngeal swelling, oropharyngeal exudate or uvula swelling.   Eyes:      General: No scleral icterus.        Right eye: No discharge.         Left eye: No discharge.      Conjunctiva/sclera: Conjunctivae normal.   Neck:      Thyroid: No thyromegaly or thyroid tenderness.      Trachea: Trachea normal.   Cardiovascular:      Rate and Rhythm: Normal rate and regular rhythm.      Heart sounds: Normal heart sounds. No murmur heard.    No friction rub. No gallop.   Pulmonary:      Effort: Pulmonary effort is normal. No respiratory distress.      Breath sounds: Normal breath sounds. No stridor. No wheezing, rhonchi or rales.   Musculoskeletal:      Cervical back: Neck supple.   Lymphadenopathy:      Head:      Right side of head: No submandibular or posterior auricular adenopathy.      Left side of head: No submandibular or posterior auricular adenopathy.      Cervical: No cervical adenopathy.      Right cervical: No superficial, deep or posterior  cervical adenopathy.     Left cervical: No superficial, deep or posterior cervical adenopathy.      Upper Body:      Right upper body: No supraclavicular adenopathy.      Left upper body: No supraclavicular adenopathy.   Skin:     General: Skin is warm and dry.   Neurological:      Mental Status: She is alert. Mental status is at baseline.   Psychiatric:         Mood and Affect: Mood normal.         Behavior: Behavior normal.       Assessment:       1. Upper respiratory tract infection, unspecified type    2. Acute cough        Plan:       Upper respiratory tract infection, unspecified type  Concern symptoms 2/2 COVID  -     COVID-19 Routine Screening  -     nirmatrelvir-ritonavir 300 mg (150 mg x 2)-100 mg copackaged tablets (EUA); Take 3 tablets by mouth 2 (two) times daily for 5 days. Each dose contains 2 nirmatrelvir (pink tablets) and 1 ritonavir (white tablet). Take all 3 tablets together  -     azelastine (ASTELIN) 137 mcg (0.1 %) nasal spray; 2 sprays (274 mcg total) by Nasal route 2 (two) times daily.  -     promethazine-dextromethorphan (PROMETHAZINE-DM) 6.25-15 mg/5 mL Syrp; Take 5 mLs by mouth every 6 (six) hours as needed (cough).  -     POCT Influenza A/B Molecular  -     benzonatate (TESSALON) 100 MG capsule; Take 1 capsule (100 mg total) by mouth 3 (three) times daily as needed for Cough.    Acute cough  -     albuterol (PROVENTIL/VENTOLIN HFA) 90 mcg/actuation inhaler; Inhale 2 puffs into the lungs every 6 (six) hours as needed for Wheezing or Shortness of Breath. Rescue  -     COVID-19 Routine Screening      Bernadette Reich MD  5/11/2023

## 2023-05-11 NOTE — LETTER
May 11, 2023      [facility]  [address]  [facility] [address]  Phone: [phone]  Fax: [phone]       Patient: Shaina Mas   YOB: 1985  Date of Visit: 05/11/2023    To Whom It May Concern:    Shaina Mas  was at [facility] on 05/11/2023. She may return to work/school on 5/15/2023 with no restrictions. If you have any questions or concerns, or if I can be of further assistance, please do not hesitate to contact me.      Sincerely,        Bernadette Reich MD

## 2023-05-11 NOTE — PATIENT INSTRUCTIONS
I have sent Paxlovid to your pharmacy to help decrease the duration and severity of your COVID symptoms. This medication commonly will cause an abnormal taste in your mouth and may cause upset stomach. Otherwise it is pretty well tolerated. Occasionally people will have rebound symptoms after they finish the medication or if they stop the medication early, so make sure to complete all the medication as directed. I have also sent azelastine nasal spray to help with congestion and promethazine-DM cough syrup to your pharmacy to help with symptom management. The cough syrup can cause drowsiness, so avoid activities that could be dangerous such as driving while taking this medication.    COVID will typically cause symptoms to worsen over 4-6 days and then symptoms should start to improve over the next 7-10. The symptoms can be managed by using over the counter medications and home remedies. Body aches, headaches, and fevers can be treated using Tylenol 1,000 mg every 8 hours or 500 mg every 4 hours. Mucinex 1,200 mg twice daily can help with chest congestion, but you must be well hydrated for the medication to work. For cough you may take medications that contain dextromethorphan. Sore throat can also be treated with warm tea with honey and salt water gargling (8 oz warm water with 1/4 tsp salt). You may also use a saline nasal rinse to help clear congestion (recommend using only sterile or distilled water). It is also very important to drink at least 3 liters of water daily, especially if you are having fevers. If you are not eating very much you may also try adding one glass of pedialyte for every 3 glasses of water you drink.     If you begin to have worsening symptoms such as difficulty breathing, unstoppable diarrhea or vomiting, loss of consciousness, or any other concerning signs or symptoms please call 911 and go to your nearest emergency room for evaluation.     We recommend self isolation at home for at least  5 days since symptom onset. It is also important to contact anyone you have been around in the last week and let them know to be tested for COVID. You may end isolation after 5 days if you symptoms have improved and you have not had a fever >100.4F without medication for the past 24 hours. If isolation is discontinued at 5 days, we do recommend wearing a mask anytime you're in public for an additional 5 days after.

## 2023-05-12 LAB — SARS-COV-2 RNA RESP QL NAA+PROBE: NOT DETECTED

## 2023-05-19 ENCOUNTER — TELEPHONE (OUTPATIENT)
Dept: BARIATRICS | Facility: CLINIC | Age: 38
End: 2023-05-19
Payer: COMMERCIAL

## 2023-05-23 ENCOUNTER — OFFICE VISIT (OUTPATIENT)
Dept: INTERNAL MEDICINE | Facility: CLINIC | Age: 38
End: 2023-05-23
Payer: COMMERCIAL

## 2023-05-23 ENCOUNTER — PATIENT MESSAGE (OUTPATIENT)
Dept: INTERNAL MEDICINE | Facility: CLINIC | Age: 38
End: 2023-05-23
Payer: COMMERCIAL

## 2023-05-23 ENCOUNTER — HOSPITAL ENCOUNTER (OUTPATIENT)
Dept: RADIOLOGY | Facility: HOSPITAL | Age: 38
Discharge: HOME | End: 2023-05-23
Attending: INTERNAL MEDICINE
Payer: COMMERCIAL

## 2023-05-23 VITALS
WEIGHT: 288.38 LBS | HEIGHT: 66 IN | HEART RATE: 105 BPM | SYSTOLIC BLOOD PRESSURE: 126 MMHG | DIASTOLIC BLOOD PRESSURE: 80 MMHG | OXYGEN SATURATION: 94 % | BODY MASS INDEX: 46.35 KG/M2

## 2023-05-23 DIAGNOSIS — B96.89 PROTRACTED BACTERIAL BRONCHITIS: Primary | ICD-10-CM

## 2023-05-23 DIAGNOSIS — J42 PROTRACTED BACTERIAL BRONCHITIS: ICD-10-CM

## 2023-05-23 DIAGNOSIS — J42 PROTRACTED BACTERIAL BRONCHITIS: Primary | ICD-10-CM

## 2023-05-23 DIAGNOSIS — B96.89 PROTRACTED BACTERIAL BRONCHITIS: ICD-10-CM

## 2023-05-23 PROCEDURE — 99213 OFFICE O/P EST LOW 20 MIN: CPT | Mod: S$GLB,,, | Performed by: INTERNAL MEDICINE

## 2023-05-23 PROCEDURE — 3079F DIAST BP 80-89 MM HG: CPT | Mod: CPTII,S$GLB,, | Performed by: INTERNAL MEDICINE

## 2023-05-23 PROCEDURE — 99999 PR PBB SHADOW E&M-EST. PATIENT-LVL III: ICD-10-PCS | Mod: PBBFAC,,, | Performed by: INTERNAL MEDICINE

## 2023-05-23 PROCEDURE — 3079F PR MOST RECENT DIASTOLIC BLOOD PRESSURE 80-89 MM HG: ICD-10-PCS | Mod: CPTII,S$GLB,, | Performed by: INTERNAL MEDICINE

## 2023-05-23 PROCEDURE — 71046 XR CHEST PA AND LATERAL: ICD-10-PCS | Mod: 26,,, | Performed by: RADIOLOGY

## 2023-05-23 PROCEDURE — 1159F MED LIST DOCD IN RCRD: CPT | Mod: CPTII,S$GLB,, | Performed by: INTERNAL MEDICINE

## 2023-05-23 PROCEDURE — 71046 X-RAY EXAM CHEST 2 VIEWS: CPT | Mod: 26,,, | Performed by: RADIOLOGY

## 2023-05-23 PROCEDURE — 3008F PR BODY MASS INDEX (BMI) DOCUMENTED: ICD-10-PCS | Mod: CPTII,S$GLB,, | Performed by: INTERNAL MEDICINE

## 2023-05-23 PROCEDURE — 3008F BODY MASS INDEX DOCD: CPT | Mod: CPTII,S$GLB,, | Performed by: INTERNAL MEDICINE

## 2023-05-23 PROCEDURE — 1160F RVW MEDS BY RX/DR IN RCRD: CPT | Mod: CPTII,S$GLB,, | Performed by: INTERNAL MEDICINE

## 2023-05-23 PROCEDURE — 99213 PR OFFICE/OUTPT VISIT, EST, LEVL III, 20-29 MIN: ICD-10-PCS | Mod: S$GLB,,, | Performed by: INTERNAL MEDICINE

## 2023-05-23 PROCEDURE — 1159F PR MEDICATION LIST DOCUMENTED IN MEDICAL RECORD: ICD-10-PCS | Mod: CPTII,S$GLB,, | Performed by: INTERNAL MEDICINE

## 2023-05-23 PROCEDURE — 1160F PR REVIEW ALL MEDS BY PRESCRIBER/CLIN PHARMACIST DOCUMENTED: ICD-10-PCS | Mod: CPTII,S$GLB,, | Performed by: INTERNAL MEDICINE

## 2023-05-23 PROCEDURE — 3074F SYST BP LT 130 MM HG: CPT | Mod: CPTII,S$GLB,, | Performed by: INTERNAL MEDICINE

## 2023-05-23 PROCEDURE — 3074F PR MOST RECENT SYSTOLIC BLOOD PRESSURE < 130 MM HG: ICD-10-PCS | Mod: CPTII,S$GLB,, | Performed by: INTERNAL MEDICINE

## 2023-05-23 PROCEDURE — 99999 PR PBB SHADOW E&M-EST. PATIENT-LVL III: CPT | Mod: PBBFAC,,, | Performed by: INTERNAL MEDICINE

## 2023-05-23 PROCEDURE — 71046 X-RAY EXAM CHEST 2 VIEWS: CPT | Mod: TC

## 2023-05-23 RX ORDER — PROMETHAZINE HYDROCHLORIDE AND DEXTROMETHORPHAN HYDROBROMIDE 6.25; 15 MG/5ML; MG/5ML
5 SYRUP ORAL EVERY 4 HOURS PRN
Qty: 240 ML | Refills: 0 | Status: SHIPPED | OUTPATIENT
Start: 2023-05-23 | End: 2023-06-02

## 2023-05-23 RX ORDER — AZITHROMYCIN 250 MG/1
TABLET, FILM COATED ORAL
Qty: 6 TABLET | Refills: 0 | Status: SHIPPED | OUTPATIENT
Start: 2023-05-23 | End: 2023-05-28

## 2023-05-23 NOTE — PROGRESS NOTES
Subjective:       Patient ID: Shaina Mas is a 37 y.o. female.    Chief Complaint: Cough, Nasal Congestion, and Laryngitis      Shaina Mas is 37 y.o. female who presents for cough productive yellow mucus, sinus congestion, loss of voice, ear congestion X 3 weeks.  (+) diarrhea 3-4x/day X 2 days.  Pt was seen by PCP with negative COVID 19 and flu that was negative.  Pt was tx with tessalon for cough and alb MDI.  Pt has been taking ibuprofen and tylenol alternating for headaches.        Review of Systems   Constitutional:  Positive for fever (subjective). Negative for chills.   HENT:  Positive for postnasal drip and sinus pressure/congestion. Negative for sore throat.    Respiratory:  Positive for cough and shortness of breath.    Gastrointestinal:  Positive for diarrhea. Negative for abdominal pain, constipation, nausea and vomiting.   Neurological:  Positive for headaches.       Objective:      Physical Exam  Vitals reviewed.   Constitutional:       General: She is awake.      Appearance: Normal appearance.   HENT:      Head: Normocephalic and atraumatic.      Nose:      Right Turbinates: Swollen.      Left Turbinates: Swollen.      Right Sinus: No maxillary sinus tenderness or frontal sinus tenderness.      Left Sinus: No maxillary sinus tenderness or frontal sinus tenderness.      Mouth/Throat:      Mouth: Mucous membranes are moist.      Pharynx: Oropharynx is clear.   Eyes:      Extraocular Movements: Extraocular movements intact.      Conjunctiva/sclera: Conjunctivae normal.      Pupils: Pupils are equal, round, and reactive to light.   Cardiovascular:      Rate and Rhythm: Regular rhythm. Tachycardia present.      Heart sounds: No murmur heard.    No friction rub. No gallop.   Pulmonary:      Effort: Pulmonary effort is normal. No accessory muscle usage, respiratory distress or retractions.      Breath sounds: No decreased air movement. Examination of the right-middle field reveals rhonchi. Examination  of the left-middle field reveals rhonchi. Examination of the right-lower field reveals rhonchi. Examination of the left-lower field reveals rhonchi. Rhonchi present. No decreased breath sounds, wheezing or rales.   Abdominal:      General: Bowel sounds are normal. There is no distension.      Tenderness: There is no abdominal tenderness. There is no guarding or rebound.   Musculoskeletal:      Right lower leg: No edema.      Left lower leg: No edema.   Lymphadenopathy:      Cervical: No cervical adenopathy.   Neurological:      Mental Status: She is alert and oriented to person, place, and time.   Psychiatric:         Mood and Affect: Mood normal.         Behavior: Behavior is cooperative.       Assessment:       1. Protracted bacterial bronchitis        Plan:     Pt with s/s of a protracted bacterial bronchitis.  Will get CXR given cough > 3 weeks.  Will tx with Zithromax 500 mg po X 1 then 250 mg po daily X 4 days.  Will tx cough with phenergan DM 5 mL po Q4 prn.  Patient was advised to follow up if symptom are not improving or worsened.    Shaina was seen today for cough, nasal congestion and laryngitis.    Diagnoses and all orders for this visit:    Protracted bacterial bronchitis  -     X-Ray Chest PA And Lateral; Future  -     azithromycin (Z-NORAH) 250 MG tablet; Take 2 tablets by mouth on day 1; Take 1 tablet by mouth on days 2-5  -     promethazine-dextromethorphan (PROMETHAZINE-DM) 6.25-15 mg/5 mL Syrp; Take 5 mLs by mouth every 4 (four) hours as needed (cough).

## 2023-05-26 PROBLEM — U07.1 COVID-19: Status: ACTIVE | Noted: 2023-05-26

## 2023-06-06 ENCOUNTER — OFFICE VISIT (OUTPATIENT)
Dept: INTERNAL MEDICINE | Facility: CLINIC | Age: 38
End: 2023-06-06
Payer: COMMERCIAL

## 2023-06-06 VITALS
DIASTOLIC BLOOD PRESSURE: 76 MMHG | OXYGEN SATURATION: 98 % | HEART RATE: 101 BPM | HEIGHT: 66 IN | WEIGHT: 287.5 LBS | SYSTOLIC BLOOD PRESSURE: 120 MMHG | BODY MASS INDEX: 46.21 KG/M2

## 2023-06-06 DIAGNOSIS — R05.9 COUGH IN ADULT: Primary | ICD-10-CM

## 2023-06-06 DIAGNOSIS — J42 PROTRACTED BACTERIAL BRONCHITIS: ICD-10-CM

## 2023-06-06 DIAGNOSIS — B96.89 PROTRACTED BACTERIAL BRONCHITIS: ICD-10-CM

## 2023-06-06 DIAGNOSIS — J06.9 UPPER RESPIRATORY TRACT INFECTION, UNSPECIFIED TYPE: ICD-10-CM

## 2023-06-06 DIAGNOSIS — H65.193 ACUTE MUCOID OTITIS MEDIA OF BOTH EARS: ICD-10-CM

## 2023-06-06 PROCEDURE — 3074F SYST BP LT 130 MM HG: CPT | Mod: CPTII,S$GLB,, | Performed by: PHYSICIAN ASSISTANT

## 2023-06-06 PROCEDURE — 99214 OFFICE O/P EST MOD 30 MIN: CPT | Mod: S$GLB,,, | Performed by: PHYSICIAN ASSISTANT

## 2023-06-06 PROCEDURE — 3008F BODY MASS INDEX DOCD: CPT | Mod: CPTII,S$GLB,, | Performed by: PHYSICIAN ASSISTANT

## 2023-06-06 PROCEDURE — 3078F PR MOST RECENT DIASTOLIC BLOOD PRESSURE < 80 MM HG: ICD-10-PCS | Mod: CPTII,S$GLB,, | Performed by: PHYSICIAN ASSISTANT

## 2023-06-06 PROCEDURE — 99999 PR PBB SHADOW E&M-EST. PATIENT-LVL IV: CPT | Mod: PBBFAC,,, | Performed by: PHYSICIAN ASSISTANT

## 2023-06-06 PROCEDURE — 1160F PR REVIEW ALL MEDS BY PRESCRIBER/CLIN PHARMACIST DOCUMENTED: ICD-10-PCS | Mod: CPTII,S$GLB,, | Performed by: PHYSICIAN ASSISTANT

## 2023-06-06 PROCEDURE — 1159F PR MEDICATION LIST DOCUMENTED IN MEDICAL RECORD: ICD-10-PCS | Mod: CPTII,S$GLB,, | Performed by: PHYSICIAN ASSISTANT

## 2023-06-06 PROCEDURE — 99999 PR PBB SHADOW E&M-EST. PATIENT-LVL IV: ICD-10-PCS | Mod: PBBFAC,,, | Performed by: PHYSICIAN ASSISTANT

## 2023-06-06 PROCEDURE — 3008F PR BODY MASS INDEX (BMI) DOCUMENTED: ICD-10-PCS | Mod: CPTII,S$GLB,, | Performed by: PHYSICIAN ASSISTANT

## 2023-06-06 PROCEDURE — 3078F DIAST BP <80 MM HG: CPT | Mod: CPTII,S$GLB,, | Performed by: PHYSICIAN ASSISTANT

## 2023-06-06 PROCEDURE — 1159F MED LIST DOCD IN RCRD: CPT | Mod: CPTII,S$GLB,, | Performed by: PHYSICIAN ASSISTANT

## 2023-06-06 PROCEDURE — 3074F PR MOST RECENT SYSTOLIC BLOOD PRESSURE < 130 MM HG: ICD-10-PCS | Mod: CPTII,S$GLB,, | Performed by: PHYSICIAN ASSISTANT

## 2023-06-06 PROCEDURE — 99214 PR OFFICE/OUTPT VISIT, EST, LEVL IV, 30-39 MIN: ICD-10-PCS | Mod: S$GLB,,, | Performed by: PHYSICIAN ASSISTANT

## 2023-06-06 PROCEDURE — 1160F RVW MEDS BY RX/DR IN RCRD: CPT | Mod: CPTII,S$GLB,, | Performed by: PHYSICIAN ASSISTANT

## 2023-06-06 RX ORDER — FAMOTIDINE 20 MG/1
20 TABLET, FILM COATED ORAL 2 TIMES DAILY
Qty: 60 TABLET | Refills: 11 | Status: SHIPPED | OUTPATIENT
Start: 2023-06-06 | End: 2023-06-13

## 2023-06-06 RX ORDER — PREDNISONE 20 MG/1
40 TABLET ORAL DAILY
Qty: 6 TABLET | Refills: 0 | Status: SHIPPED | OUTPATIENT
Start: 2023-06-06 | End: 2023-06-09

## 2023-06-06 RX ORDER — DOXYCYCLINE 100 MG/1
100 CAPSULE ORAL 2 TIMES DAILY
Qty: 14 CAPSULE | Refills: 0 | Status: SHIPPED | OUTPATIENT
Start: 2023-06-06 | End: 2023-06-13

## 2023-06-06 RX ORDER — BENZONATATE 100 MG/1
100 CAPSULE ORAL 3 TIMES DAILY PRN
Qty: 30 CAPSULE | Refills: 0 | Status: SHIPPED | OUTPATIENT
Start: 2023-06-06 | End: 2024-03-14 | Stop reason: SDUPTHER

## 2023-06-06 NOTE — PROGRESS NOTES
INTERNAL MEDICINE URGENT VISIT NOTE    CHIEF COMPLAINT     Chief Complaint   Patient presents with    Cough    Diarrhea       HPI     Shaina Mas is a 37 y.o. female who presents for an urgent visit today.    PCP is Bernadette Reich MD, patient is known to me.     Cough for 5 weeks   No fever/chills/nuasea/vomiting  No sick contacts or recent travel  Negative COVID test  Negative Flu test  Has already completed z-pack and cough suppressants   Has not had steroids for these symptoms yet.   Has had tessalon and cough syrup   Having post nasal drip and some heart burn  Will start doxy and short burst of steroids   Will start pepcid and refill tessalon   ENT referral placed - suspect that Cough is from persistent uncontrolled PND.     Exam  TM's erythematous and bulging and dull bilaterally   Posterior oropharynx is erythematous and slightly edematous   No asymmetry .       Past Medical History:  Past Medical History:   Diagnosis Date    Prediabetes     Smoker        Home Medications:  Prior to Admission medications    Medication Sig Start Date End Date Taking? Authorizing Provider   albuterol (PROVENTIL/VENTOLIN HFA) 90 mcg/actuation inhaler Inhale 2 puffs into the lungs every 6 (six) hours as needed for Wheezing or Shortness of Breath. Rescue 5/11/23  Yes Bernadette Reich MD   ammonium lactate 12 % Crea Apply twice daily to affected parts both feet as needed. 7/19/22  Yes Cyrus Moses DPM   azelastine (ASTELIN) 137 mcg (0.1 %) nasal spray 2 sprays (274 mcg total) by Nasal route 2 (two) times daily. 5/11/23 5/10/24 Yes Bernadette Reich MD   benzonatate (TESSALON) 100 MG capsule Take 1 capsule (100 mg total) by mouth 3 (three) times daily as needed for Cough. 5/11/23  Yes Bernadette Reich MD   fluticasone propionate (FLONASE) 50 mcg/actuation nasal spray 1 spray (50 mcg total) by Each Nostril route 2 (two) times daily. 6/14/22  Yes Foster Fonseca PA-C   montelukast (SINGULAIR) 10 mg tablet TAKE 1 TABLET BY  "MOUTH EVERY DAY IN THE EVENING 1/20/23  Yes April Louis PA-C   mv-min/iron/folic/calcium/vitK (WOMEN'S MULTIVITAMIN ORAL) Take by mouth.   Yes Historical Provider   norelgestromin-ethinyl estradiol 150-35 mcg/24 hr Place 1 patch onto the skin once a week. CHANGE PATCH Q WEEK X 3 WEEKS THEN ONE WEEK OFF WITH NO PATCH 3/10/23  Yes Felix St Jr., MD       Review of Systems:  Review of Systems   Constitutional:  Negative for chills and fever.   HENT:  Negative for sore throat and trouble swallowing.    Eyes:  Negative for visual disturbance.   Respiratory:  Negative for cough and shortness of breath.    Cardiovascular:  Negative for chest pain.   Gastrointestinal:  Negative for abdominal pain, constipation, diarrhea, nausea and vomiting.   Genitourinary:  Negative for dysuria and flank pain.   Musculoskeletal:  Negative for back pain, neck pain and neck stiffness.   Skin:  Negative for rash.   Neurological:  Negative for dizziness, syncope, weakness and headaches.   Psychiatric/Behavioral:  Negative for confusion.      Health Maintainence:   Immunizations:  Health Maintenance         Date Due Completion Date    TETANUS VACCINE Never done ---    COVID-19 Vaccine (4 - Pfizer series) 03/11/2022 1/14/2022    Influenza Vaccine (Season Ended) 09/01/2023 ---    Hemoglobin A1c (Prediabetes) 12/29/2023 12/29/2022    Cervical Cancer Screening 06/16/2025 6/16/2022             PHYSICAL EXAM     /76 (BP Location: Left arm, Patient Position: Sitting, BP Method: Large (Manual))   Pulse 101   Ht 5' 6" (1.676 m)   Wt 130.4 kg (287 lb 7.7 oz)   LMP 06/03/2023   SpO2 98%   BMI 46.40 kg/m²     Physical Exam  Vitals and nursing note reviewed.   Constitutional:       Appearance: Normal appearance.      Comments: Healthy appearing female in NAD or apparent pain. She makes good eye contact, speaks in clear full sentences and ambulates with ease. Sounds nasally congested when speaking, coughs during interview and exam. "        HENT:      Head: Normocephalic and atraumatic.      Nose: Nose normal.      Mouth/Throat:      Pharynx: Oropharynx is clear.   Eyes:      Conjunctiva/sclera: Conjunctivae normal.   Cardiovascular:      Rate and Rhythm: Normal rate and regular rhythm.      Pulses: Normal pulses.   Pulmonary:      Effort: No respiratory distress.      Comments: Lungs are CTA b/l   Abdominal:      Tenderness: There is no abdominal tenderness.   Musculoskeletal:         General: Normal range of motion.      Cervical back: No rigidity.   Skin:     General: Skin is warm and dry.      Capillary Refill: Capillary refill takes less than 2 seconds.      Findings: No rash.   Neurological:      General: No focal deficit present.      Mental Status: She is alert.      Gait: Gait normal.   Psychiatric:         Mood and Affect: Mood normal.       LABS     Lab Results   Component Value Date    HGBA1C 6.2 (H) 12/29/2022     CMP  Sodium   Date Value Ref Range Status   12/29/2022 138 136 - 145 mmol/L Final     Potassium   Date Value Ref Range Status   12/29/2022 4.2 3.5 - 5.1 mmol/L Final     Chloride   Date Value Ref Range Status   12/29/2022 107 95 - 110 mmol/L Final     CO2   Date Value Ref Range Status   12/29/2022 26 23 - 29 mmol/L Final     Glucose   Date Value Ref Range Status   12/29/2022 118 (H) 70 - 110 mg/dL Final     BUN   Date Value Ref Range Status   12/29/2022 9 6 - 20 mg/dL Final     Creatinine   Date Value Ref Range Status   12/29/2022 0.7 0.5 - 1.4 mg/dL Final     Calcium   Date Value Ref Range Status   12/29/2022 8.7 8.7 - 10.5 mg/dL Final     Total Protein   Date Value Ref Range Status   12/29/2022 7.1 6.0 - 8.4 g/dL Final     Albumin   Date Value Ref Range Status   03/30/2023 3.8 3.6 - 5.1 g/dL Final     Total Bilirubin   Date Value Ref Range Status   12/29/2022 0.3 0.1 - 1.0 mg/dL Final     Comment:     For infants and newborns, interpretation of results should be based  on gestational age, weight and in agreement with  clinical  observations.    Premature Infant recommended reference ranges:  Up to 24 hours.............<8.0 mg/dL  Up to 48 hours............<12.0 mg/dL  3-5 days..................<15.0 mg/dL  6-29 days.................<15.0 mg/dL       Alkaline Phosphatase   Date Value Ref Range Status   12/29/2022 60 55 - 135 U/L Final     AST   Date Value Ref Range Status   12/29/2022 19 10 - 40 U/L Final     ALT   Date Value Ref Range Status   12/29/2022 30 10 - 44 U/L Final     Anion Gap   Date Value Ref Range Status   12/29/2022 5 (L) 8 - 16 mmol/L Final     Lab Results   Component Value Date    WBC 6.09 12/29/2022    HGB 13.2 12/29/2022    HCT 40.9 12/29/2022    MCV 81 (L) 12/29/2022     12/29/2022     Lab Results   Component Value Date    CHOL 186 12/29/2022     Lab Results   Component Value Date    HDL 46 12/29/2022     Lab Results   Component Value Date    LDLCALC 91.6 12/29/2022     Lab Results   Component Value Date    TRIG 242 (H) 12/29/2022     Lab Results   Component Value Date    CHOLHDL 24.7 12/29/2022     Lab Results   Component Value Date    TSH 2.690 12/29/2022       ASSESSMENT/PLAN     Shaina Mas is a 37 y.o. female     Shaina was seen today for cough and diarrhea. Will start second round of abx, ears look infected. She will benefit from a short burst of steroids as well. Suspect that diarrhea is related to GI roldan upset from recent abx, low suspicion that this is c diff. Start probiotic and RTC if symptoms do not improve or worsen. Start Pepcid to help with and LPR that could be contributing to cough.     Diagnoses and all orders for this visit:    Cough in adult  -     Ambulatory referral/consult to ENT; Future    Upper respiratory tract infection, unspecified type  -     benzonatate (TESSALON) 100 MG capsule; Take 1 capsule (100 mg total) by mouth 3 (three) times daily as needed for Cough.    Protracted bacterial bronchitis    Acute mucoid otitis media of both ears    Other orders  -     doxycycline  (MONODOX) 100 MG capsule; Take 1 capsule (100 mg total) by mouth 2 (two) times daily. for 7 days  -     predniSONE (DELTASONE) 20 MG tablet; Take 2 tablets (40 mg total) by mouth once daily. for 3 days  -     famotidine (PEPCID) 20 MG tablet; Take 1 tablet (20 mg total) by mouth 2 (two) times daily.          Patient was counseled on when and how to seek emergent care.       April Louis PA-C   Department of Internal Medicine - [facility]   11:58 AM

## 2023-06-13 ENCOUNTER — LAB VISIT (OUTPATIENT)
Dept: LAB | Facility: OTHER | Age: 38
End: 2023-06-13
Attending: OTOLARYNGOLOGY
Payer: COMMERCIAL

## 2023-06-13 ENCOUNTER — OFFICE VISIT (OUTPATIENT)
Dept: OTOLARYNGOLOGY | Facility: CLINIC | Age: 38
End: 2023-06-13
Payer: COMMERCIAL

## 2023-06-13 VITALS — BODY MASS INDEX: 46.28 KG/M2 | WEIGHT: 288 LBS | HEIGHT: 66 IN

## 2023-06-13 DIAGNOSIS — J30.89 NON-SEASONAL ALLERGIC RHINITIS, UNSPECIFIED TRIGGER: ICD-10-CM

## 2023-06-13 DIAGNOSIS — R05.9 COUGH IN ADULT: ICD-10-CM

## 2023-06-13 DIAGNOSIS — K21.9 LARYNGOPHARYNGEAL REFLUX (LPR): Primary | ICD-10-CM

## 2023-06-13 PROCEDURE — 3008F BODY MASS INDEX DOCD: CPT | Mod: CPTII,S$GLB,, | Performed by: OTOLARYNGOLOGY

## 2023-06-13 PROCEDURE — 86003 ALLG SPEC IGE CRUDE XTRC EA: CPT | Performed by: OTOLARYNGOLOGY

## 2023-06-13 PROCEDURE — 1160F RVW MEDS BY RX/DR IN RCRD: CPT | Mod: CPTII,S$GLB,, | Performed by: OTOLARYNGOLOGY

## 2023-06-13 PROCEDURE — 99213 OFFICE O/P EST LOW 20 MIN: CPT | Mod: 25,S$GLB,, | Performed by: OTOLARYNGOLOGY

## 2023-06-13 PROCEDURE — 86003 ALLG SPEC IGE CRUDE XTRC EA: CPT | Mod: 59 | Performed by: OTOLARYNGOLOGY

## 2023-06-13 PROCEDURE — 99213 PR OFFICE/OUTPT VISIT, EST, LEVL III, 20-29 MIN: ICD-10-PCS | Mod: 25,S$GLB,, | Performed by: OTOLARYNGOLOGY

## 2023-06-13 PROCEDURE — 1160F PR REVIEW ALL MEDS BY PRESCRIBER/CLIN PHARMACIST DOCUMENTED: ICD-10-PCS | Mod: CPTII,S$GLB,, | Performed by: OTOLARYNGOLOGY

## 2023-06-13 PROCEDURE — 3008F PR BODY MASS INDEX (BMI) DOCUMENTED: ICD-10-PCS | Mod: CPTII,S$GLB,, | Performed by: OTOLARYNGOLOGY

## 2023-06-13 PROCEDURE — 1159F PR MEDICATION LIST DOCUMENTED IN MEDICAL RECORD: ICD-10-PCS | Mod: CPTII,S$GLB,, | Performed by: OTOLARYNGOLOGY

## 2023-06-13 PROCEDURE — 31575 DIAGNOSTIC LARYNGOSCOPY: CPT | Mod: S$GLB,,, | Performed by: OTOLARYNGOLOGY

## 2023-06-13 PROCEDURE — 31575 PR LARYNGOSCOPY, FLEXIBLE; DIAGNOSTIC: ICD-10-PCS | Mod: S$GLB,,, | Performed by: OTOLARYNGOLOGY

## 2023-06-13 PROCEDURE — 36415 COLL VENOUS BLD VENIPUNCTURE: CPT | Performed by: OTOLARYNGOLOGY

## 2023-06-13 PROCEDURE — 1159F MED LIST DOCD IN RCRD: CPT | Mod: CPTII,S$GLB,, | Performed by: OTOLARYNGOLOGY

## 2023-06-13 RX ORDER — AZELASTINE 1 MG/ML
1 SPRAY, METERED NASAL 2 TIMES DAILY
Qty: 30 ML | Refills: 11 | Status: SHIPPED | OUTPATIENT
Start: 2023-06-13 | End: 2027-01-17

## 2023-06-13 RX ORDER — FAMOTIDINE 40 MG/1
40 TABLET, FILM COATED ORAL 2 TIMES DAILY
Qty: 60 TABLET | Refills: 1 | Status: SHIPPED | OUTPATIENT
Start: 2023-06-13 | End: 2023-08-15 | Stop reason: ALTCHOICE

## 2023-06-13 NOTE — PROGRESS NOTES
Subjective:     Chief Complaint:   Chief Complaint   Patient presents with    cough       Shaina Mas is a 37 y.o. female who was referred to me by April Louis in consultation for cough.    Patient reports onset of symptoms occurred 6 weeks ago.  Reports URI symptoms with cough nasal congestion facial pain discolored drainage.  She also reports ear fullness.  She was seen in urgent care multiple times.  She was treated with multiple rounds of antibiotics.  Reports 4 weeks without significant improvement.  Over the past 2 weeks she has had some improvement of her cough.  She reports frequent throat clearing and sensation of phlegm in the back of her throat.  She denies rhinorrhea or needing to blow the nose at this point.  She was recently seen by her primary care and found to have otitis media.  She is been started on doxycycline and is currently taking this.  She is also using intranasal Flonase on occasion.      She has not had allergy testing. There are no known allergens     She does not have a history of asthma.      She reports a history of reflux symptoms. Patient has not previously had an EGD.     She denies a diagnosis of obstructive sleep apnea.      There is not a prior history of sinonasal surgery.      Past Medical History  She has a past medical history of Prediabetes and Smoker.    Past Surgical History  She has a past surgical history that includes No past surgeries.    Family History  Her family history includes Allergies in her brother and brother; Anemia in her maternal grandmother; Cancer in her maternal grandfather; Diabetes in her father and paternal grandmother; Early death in her paternal grandfather; Hyperlipidemia in her father; Hypertension in her father and mother; Osteoarthritis in her brother.    Social History  She reports that she has quit smoking. Her smoking use included cigarettes. She has a 0.50 pack-year smoking history. She has never used smokeless tobacco. She reports  "that she does not currently use alcohol. She reports that she does not use drugs.    Allergies  She has No Known Allergies.    Medications  She has a current medication list which includes the following prescription(s): albuterol, ammonium lactate, benzonatate, doxycycline, fluticasone propionate, montelukast, mv-min/iron/folic/calcium/vitk, norelgestromin-ethinyl estradiol, azelastine, and famotidine.    Review of Systems       Objective:     Ht 5' 6" (1.676 m)   Wt 130.6 kg (288 lb)   LMP 06/03/2023   BMI 46.48 kg/m²      Constitutional:   Vital signs are normal. She appears well-developed and well-nourished. Normal speech.      Head:  Normocephalic and atraumatic.     Ears:    Right Ear: No drainage, swelling or tenderness. No middle ear effusion. No decreased hearing is noted.   Left Ear: No drainage, swelling or tenderness.  No middle ear effusion. No decreased hearing is noted.     Nose:  Mucosal edema present. No rhinorrhea, nose lacerations, sinus tenderness, septal deviation or nasal septal hematoma. No epistaxis.  No foreign bodies. Turbinate hypertrophy.  Turbinates normal.  Right sinus exhibits no maxillary sinus tenderness and no frontal sinus tenderness. Left sinus exhibits no maxillary sinus tenderness and no frontal sinus tenderness.     Mouth/Throat  Lips, teeth, and gums normal and oropharynx normal.     Neck:  Neck normal without thyromegaly masses, asymmetry, normal tracheal structure, crepitus, and tenderness, thyroid normal, trachea normal, phonation normal and no adenopathy.     Pulmonary/Chest:   Effort normal.     Psychiatric:   She has a normal mood and affect. Her speech is normal and behavior is normal.     Procedure    Flexible laryngoscopy performed.  See procedure note.    Nasal/Nasopharyngo/Laryn/Hypopharyngoscopy Procedures    Procedure:  Diagnostic nasal, nasopharyngoscopy, laryngoscopy and hypopharyngoscopy.    Routine preparation with local 1% neosynephrine and " lidocaine     NOSE:   External:  No gross deformity   Intranasal:    Mucosa:  No polyps, ulcers or lesions.    Septum:  No gross deformity.    Turbinates:  2+ hypertrophy     Nasopharynx:  No lesions.   Mucosa:  No lesions.   Adenoids:  Present.   Posterior Choanae:  Patent.   Eustachian Tubes:  Patent.    Oropharynx:    BOT: No lesions or edema      Larynx/hypopharynx:   Epiglottis:  No lesions, without edema.   Arytenoids: no lesions    AE Folds:  No lesions.   Vocal cords:  No masses, symmetric movement, good approximation    Subglottis: No obvious stenosis   Hypopharynx:  No lesions.   Piriform sinus:  No pooling or lesions.   Post Cricoid:  No edema or erythema        Data Reviewed    WBC (K/uL)   Date Value   12/29/2022 6.09     Eosinophil % (%)   Date Value   12/29/2022 4.3     Eos # (K/uL)   Date Value   12/29/2022 0.3     Platelets (K/uL)   Date Value   12/29/2022 285     Glucose (mg/dL)   Date Value   12/29/2022 118 (H)     No results found for: IGE    No sinus imaging available.      Assessment:     1. Laryngopharyngeal reflux (LPR)    2. Non-seasonal allergic rhinitis, unspecified trigger    3. Cough in adult          Plan:     I had a long discussion with the patient regarding her condition and the further workup and management options. Will assess a RAST panel. Rec asteline BID in addition to flonase. Discussed options for management of LPR including observation, diet and lifestyle modifications, acid suppression therapy and GI referral. Discussed the risks and benefits of each option.  Will plan for diet and lifestyle modifications and pepcid BID. Will contact her regarding the rast panel. She will contact me if her symptoms fail to improve.

## 2023-06-15 LAB
AMER SYCAMORE IGE QN: 0.15 KU/L
FEATHER PANEL #2: <0.1 KU/L

## 2023-06-16 LAB
A ALTERNATA IGE QN: <0.1 KU/L
A FUMIGATUS IGE QN: 0.15 KU/L
ALLERGEN BOXELDER MAPLE TREE IGE: 0.49 KU/L
ALLERGEN MAPLE (BOX ELDER) CLASS: ABNORMAL
ALLERGEN MULBERRY CLASS: NORMAL
ALLERGEN MULBERRY TREE IGE: <0.1 KU/L
ALLERGEN PIGWEED IGE: <0.1 KU/L
ALLERGEN WHITE ASH TREE IGE: 0.42 KU/L
BALD CYPRESS IGE QN: <0.1 KU/L
BERMUDA GRASS IGE QN: <0.1 KU/L
C HERBARUM IGE QN: <0.1 KU/L
CAT DANDER IGE QN: <0.1 KU/L
CEDAR IGE QN: <0.1 KU/L
COCKLEBUR IGE QN: <0.1 KU/L
COMMON PIGWEED CLASS: NORMAL
COMMON RAGWEED IGE QN: 0.1 KU/L
D FARINAE IGE QN: 0.25 KU/L
D PTERONYSS IGE QN: 0.13 KU/L
DEPRECATED A ALTERNATA IGE RAST QL: NORMAL
DEPRECATED A FUMIGATUS IGE RAST QL: ABNORMAL
DEPRECATED BALD CYPRESS IGE RAST QL: NORMAL
DEPRECATED BERMUDA GRASS IGE RAST QL: NORMAL
DEPRECATED C HERBARUM IGE RAST QL: NORMAL
DEPRECATED CAT DANDER IGE RAST QL: NORMAL
DEPRECATED CEDAR IGE RAST QL: NORMAL
DEPRECATED COCKLEBUR IGE RAST QL: NORMAL
DEPRECATED COMMON RAGWEED IGE RAST QL: NORMAL
DEPRECATED D FARINAE IGE RAST QL: ABNORMAL
DEPRECATED D PTERONYSS IGE RAST QL: ABNORMAL
DEPRECATED DOG DANDER IGE RAST QL: NORMAL
DEPRECATED ELDER IGE RAST QL: NORMAL
DEPRECATED ENGL PLANTAIN IGE RAST QL: ABNORMAL
DEPRECATED GOOSEFOOT IGE RAST QL: NORMAL
DEPRECATED JOHNSON GRASS IGE RAST QL: NORMAL
DEPRECATED KENT BLUE GRASS IGE RAST QL: NORMAL
DEPRECATED M RACEMOSUS IGE RAST QL: NORMAL
DEPRECATED MUGWORT IGE RAST QL: NORMAL
DEPRECATED NETTLE IGE RAST QL: ABNORMAL
DEPRECATED P NOTATUM IGE RAST QL: ABNORMAL
DEPRECATED PECAN/HICK TREE IGE RAST QL: ABNORMAL
DEPRECATED ROACH IGE RAST QL: NORMAL
DEPRECATED SHEEP SORREL IGE RAST QL: NORMAL
DEPRECATED SILVER BIRCH IGE RAST QL: ABNORMAL
DEPRECATED TIMOTHY IGE RAST QL: NORMAL
DEPRECATED WHITE OAK IGE RAST QL: ABNORMAL
DOG DANDER IGE QN: <0.1 KU/L
ELDER IGE QN: <0.1 KU/L
ELM CEDAR CLASS: NORMAL
ELM CEDAR, IGE: <0.1 KU/L
ENGL PLANTAIN IGE QN: 0.13 KU/L
GOOSEFOOT IGE QN: <0.1 KU/L
JOHNSON GRASS IGE QN: <0.1 KU/L
KENT BLUE GRASS IGE QN: <0.1 KU/L
M RACEMOSUS IGE QN: <0.1 KU/L
MUGWORT IGE QN: <0.1 KU/L
NETTLE IGE QN: 0.62 KU/L
P NOTATUM IGE QN: 0.14 KU/L
PECAN/HICK TREE IGE QN: 0.25 KU/L
ROACH IGE QN: <0.1 KU/L
SHEEP SORREL IGE QN: <0.1 KU/L
SILVER BIRCH IGE QN: 0.13 KU/L
TIMOTHY IGE QN: <0.1 KU/L
WHITE ASH CLASS: ABNORMAL
WHITE OAK IGE QN: 0.29 KU/L

## 2023-08-11 ENCOUNTER — PATIENT MESSAGE (OUTPATIENT)
Dept: RESEARCH | Facility: HOSPITAL | Age: 38
End: 2023-08-11
Payer: COMMERCIAL

## 2023-08-15 ENCOUNTER — OFFICE VISIT (OUTPATIENT)
Dept: URGENT CARE | Facility: CLINIC | Age: 38
End: 2023-08-15
Payer: OTHER MISCELLANEOUS

## 2023-08-15 VITALS
HEIGHT: 66 IN | SYSTOLIC BLOOD PRESSURE: 123 MMHG | RESPIRATION RATE: 18 BRPM | OXYGEN SATURATION: 98 % | WEIGHT: 288 LBS | BODY MASS INDEX: 46.28 KG/M2 | DIASTOLIC BLOOD PRESSURE: 82 MMHG | HEART RATE: 88 BPM | TEMPERATURE: 98 F

## 2023-08-15 DIAGNOSIS — S99.922A FOOT INJURY, LEFT, INITIAL ENCOUNTER: Primary | ICD-10-CM
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
## 2023-08-15 DIAGNOSIS — Z02.6 ENCOUNTER RELATED TO WORKER'S COMPENSATION CLAIM: ICD-10-CM

## 2023-08-15 PROCEDURE — 99203 OFFICE O/P NEW LOW 30 MIN: CPT | Mod: S$GLB,,, | Performed by: NURSE PRACTITIONER

## 2023-08-15 PROCEDURE — 73630 XR FOOT COMPLETE 3 VIEW LEFT: ICD-10-PCS | Mod: LT,S$GLB,, | Performed by: RADIOLOGY

## 2023-08-15 PROCEDURE — 99203 PR OFFICE/OUTPT VISIT, NEW, LEVL III, 30-44 MIN: ICD-10-PCS | Mod: S$GLB,,, | Performed by: NURSE PRACTITIONER

## 2023-08-15 PROCEDURE — 73630 X-RAY EXAM OF FOOT: CPT | Mod: LT,S$GLB,, | Performed by: RADIOLOGY

## 2023-08-15 NOTE — LETTER
Urgent Care - [facility]  [address] [address]  Phone: [phone]  Fax: [phone]  [facility]: [phone]    Pt Name: Shaina Mas  Injury Date: 08/15/2023   Employee ID:  Date of First Treatment: 08/15/2023   Company: [employer] reference to record [id number]      Appointment Time: 04:55 PM Arrived: 1710   Provider: Emma Sánchez NP Time Out:[id number]     Office Treatment:   1. Foot injury, left, initial encounter    2. Work related injury                     Return Appointment: 8/16/2023  To be cleared by occupational medicine           
no

## 2023-08-15 NOTE — PROGRESS NOTES
Subjective:      Patient ID: Shaina Mas is a 37 y.o. female.    Chief Complaint: Foot Injury    Patient's place of employment - [employer]  Patient's job title - Teacher  Date of injury - 8/15/23 at 12 PM   Body part injured including left or right - Left foot  Injury Mechanism - Fridge door at work fell on foot.   What they were doing when they got hurt - Patient was opening a fridge with a broken door which fell on her foot.   What they did immediately after - Applied ice for 10 minutes  Pain scale right now - 8/10  Provider note begins below    Patient reports pain to the top of her foot.  Swelling.  And tenderness    Foot Injury   Incident onset: 5 hours ago. The incident occurred at work. The pain is present in the left foot. The pain is at a severity of 8/10. Associated symptoms include an inability to bear weight and tingling. Pertinent negatives include no numbness. She reports no foreign bodies present. The symptoms are aggravated by movement. She has tried ice for the symptoms. The treatment provided no relief.       Constitution: Negative for fatigue and fever.   Cardiovascular:  Negative for chest pain and sob on exertion.   Respiratory:  Negative for chest tightness and shortness of breath.    Gastrointestinal:  Negative for nausea, vomiting, constipation and diarrhea.   Musculoskeletal:  Positive for pain, trauma, joint swelling and abnormal ROM of joint.   Skin:  Positive for erythema.   Neurological:  Negative for numbness.     Objective:     Physical Exam  Constitutional:       Appearance: Normal appearance.   HENT:      Head: Normocephalic and atraumatic.      Nose: Nose normal.   Cardiovascular:      Rate and Rhythm: Normal rate.      Pulses:           Dorsalis pedis pulses are 2+ on the left side.   Pulmonary:      Effort: Pulmonary effort is normal. No respiratory distress.   Musculoskeletal:         General: Swelling present.      Left foot: Decreased range of  motion.        Feet:    Skin:     General: Skin is warm and dry.      Findings: Erythema present. No lesion or rash.   Neurological:      General: No focal deficit present.      Mental Status: She is alert and oriented to person, place, and time.   Psychiatric:         Mood and Affect: Mood normal.         Behavior: Behavior normal.        XR FOOT COMPLETE 3 VIEW LEFT    Result Date: 8/15/2023  EXAMINATION: XR FOOT COMPLETE 3 VIEW LEFT CLINICAL HISTORY: .  Unspecified injury of left foot, initial encounter TECHNIQUE: AP, lateral and oblique views of the left foot were performed. COMPARISON: None FINDINGS: Bones, joint spaces and soft tissues appear intact.  There is no evidence of fracture, subluxation or foreign body.  Clothing artifact projects over the lower calf.     Negative left foot. Electronically signed by: Shane Emmanuel Date:    08/15/2023 Time:    18:38       Assessment:      1. Foot injury, left, initial encounter    2. Work related injury    3. Encounter related to worker's compensation claim      Plan:   Left foot x-ray - personally reviewed.  No fracture noted  Ibuprofen as needed for pain  Follow up with occupational medicine tomorrow  Ice and rest                No follow-ups on file.

## 2023-08-16 ENCOUNTER — OFFICE VISIT (OUTPATIENT)
Dept: URGENT CARE | Facility: CLINIC | Age: 38
End: 2023-08-16
Payer: OTHER MISCELLANEOUS

## 2023-08-16 VITALS
BODY MASS INDEX: 46.06 KG/M2 | TEMPERATURE: 99 F | DIASTOLIC BLOOD PRESSURE: 82 MMHG | HEART RATE: 81 BPM | WEIGHT: 286.63 LBS | SYSTOLIC BLOOD PRESSURE: 134 MMHG | HEIGHT: 66 IN | OXYGEN SATURATION: 98 %

## 2023-08-16 DIAGNOSIS — Z02.6 ENCOUNTER RELATED TO WORKER'S COMPENSATION CLAIM: ICD-10-CM

## 2023-08-16 DIAGNOSIS — S90.32XA CONTUSION OF LEFT FOOT, INITIAL ENCOUNTER: Primary | ICD-10-CM
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
## 2023-08-16 PROCEDURE — 99203 PR OFFICE/OUTPT VISIT, NEW, LEVL III, 30-44 MIN: ICD-10-PCS | Mod: S$GLB,,, | Performed by: PHYSICIAN ASSISTANT

## 2023-08-16 PROCEDURE — 99203 OFFICE O/P NEW LOW 30 MIN: CPT | Mod: S$GLB,,, | Performed by: PHYSICIAN ASSISTANT

## 2023-08-16 NOTE — LETTER
Urgent Care [address] [address]  Phone: [phone]  Fax: [phone]  [facility]: [id number]    Pt Name: Shaina Mas  Injury Date: 08/15/2023   Employee ID: [id number] Date of First Treatment: 08/16/2023   Company: [employer]      Appointment Time: 09:30 AM Arrived: 9:45 am   Provider: Mio Castaneda PA-C Time Out:11:00 am     Office Treatment:   1. Contusion of left foot, initial encounter    2. Encounter related to worker's compensation claim    3. Work related injury          Patient Instructions: Attention not to aggravate affected area (Apply ice 2-3 times daily for 30 minutes.  Elevate the foot at night.)    Restrictions: Regular Duty, Discharged from Occupational Health     Discharged from Occupational Health      KW

## 2023-08-16 NOTE — PROGRESS NOTES
Subjective:      Patient ID: Shaina Mas is a 37 y.o. female.    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  Patient states she was getting milk from a refrigerator when the refrigerator door fell off and fell onto her left foot.  Patient was seen at urgent care and had x-rays that were negative for fracture.  Patient reports some improvement in pain since yesterday.  States she applied ice and took some ibuprofen. MEB    Foot Injury   Pertinent negatives include no numbness.     Constitution: Positive for activity change.   HENT:  Negative for facial trauma.    Neck: Negative for neck pain.   Cardiovascular:  Negative for chest trauma.   Eyes:  Negative for eye trauma.   Respiratory:  Negative for shortness of breath.    Gastrointestinal:  Negative for abdominal trauma.   Musculoskeletal:  Positive for pain and trauma.   Skin:  Negative for wound and bruising.   Neurological:  Negative for numbness and tingling.     Objective:     Physical Exam  Vitals and nursing note reviewed.   Constitutional:       General: She is not in acute distress.     Appearance: She is well-developed. She is not diaphoretic.   HENT:      Head: Normocephalic and atraumatic.      Right Ear: Hearing and external ear normal.      Left Ear: Hearing and external ear normal.      Nose: Nose normal. No nasal deformity.   Eyes:      General: Lids are normal. No scleral icterus.     Conjunctiva/sclera: Conjunctivae normal.    Neck:      Trachea: Trachea normal.   Cardiovascular:      Pulses: Normal pulses.   Pulmonary:      Effort: Pulmonary effort is normal. No respiratory distress.      Breath sounds: No stridor.   Musculoskeletal:      Cervical back: Normal range of motion.      Left foot: Tenderness present. No deformity. Normal pulse.        Feet:    Skin:     General: Skin is warm and dry.      Capillary Refill: Capillary refill takes less than 2 seconds.   Neurological:      Mental Status: She is alert. She is not disoriented.      GCS: GCS eye subscore is 4. GCS verbal subscore is 5. GCS motor subscore is 6.      Sensory: No sensory deficit.   Psychiatric:         Attention and Perception: She is attentive.         Speech: Speech normal.         Behavior: Behavior normal.        Assessment:      1. Contusion of left foot, initial encounter    2. Encounter related to worker's compensation claim    3. Work related injury      Plan:          Patient Instructions: Attention not to aggravate affected area (Apply ice 2-3 times daily for 30 minutes.  Elevate the foot at night.)   Restrictions: Regular Duty, Discharged from Occupational Health  Follow up if symptoms worsen or fail to improve.

## 2023-08-21 ENCOUNTER — PATIENT MESSAGE (OUTPATIENT)
Dept: RESEARCH | Facility: HOSPITAL | Age: 38
End: 2023-08-21
Payer: COMMERCIAL

## 2023-09-26 ENCOUNTER — TELEPHONE (OUTPATIENT)
Dept: INTERNAL MEDICINE | Facility: CLINIC | Age: 38
End: 2023-09-26
Payer: COMMERCIAL

## 2023-09-26 NOTE — TELEPHONE ENCOUNTER
----- Message from Jackie Kelly MA sent at 9/26/2023  1:47 PM CDT -----  Name of Who is Calling:HILLARY WALDROP [[id number]]                What is the request in detail: Pt is requesting a call back to see if she can come in earlier for her visit on 9/28. Please assist.                Can the clinic reply by [facility]: No                What Number [facility] Back [facility]: [phone]

## 2023-10-12 ENCOUNTER — OFFICE VISIT (OUTPATIENT)
Dept: INTERNAL MEDICINE | Facility: CLINIC | Age: 38
End: 2023-10-12
Payer: COMMERCIAL

## 2023-10-12 VITALS
DIASTOLIC BLOOD PRESSURE: 78 MMHG | HEART RATE: 97 BPM | SYSTOLIC BLOOD PRESSURE: 135 MMHG | OXYGEN SATURATION: 98 % | BODY MASS INDEX: 47.82 KG/M2 | WEIGHT: 293 LBS

## 2023-10-12 DIAGNOSIS — R73.03 PREDIABETES: Primary | ICD-10-CM

## 2023-10-12 PROCEDURE — 99215 PR OFFICE/OUTPT VISIT, EST, LEVL V, 40-54 MIN: ICD-10-PCS | Mod: S$GLB,,, | Performed by: STUDENT IN AN ORGANIZED HEALTH CARE EDUCATION/TRAINING PROGRAM

## 2023-10-12 PROCEDURE — 99215 OFFICE O/P EST HI 40 MIN: CPT | Mod: S$GLB,,, | Performed by: STUDENT IN AN ORGANIZED HEALTH CARE EDUCATION/TRAINING PROGRAM

## 2023-10-12 PROCEDURE — 1159F MED LIST DOCD IN RCRD: CPT | Mod: CPTII,S$GLB,, | Performed by: STUDENT IN AN ORGANIZED HEALTH CARE EDUCATION/TRAINING PROGRAM

## 2023-10-12 PROCEDURE — 3008F BODY MASS INDEX DOCD: CPT | Mod: CPTII,S$GLB,, | Performed by: STUDENT IN AN ORGANIZED HEALTH CARE EDUCATION/TRAINING PROGRAM

## 2023-10-12 PROCEDURE — 3075F SYST BP GE 130 - 139MM HG: CPT | Mod: CPTII,S$GLB,, | Performed by: STUDENT IN AN ORGANIZED HEALTH CARE EDUCATION/TRAINING PROGRAM

## 2023-10-12 PROCEDURE — 99999 PR PBB SHADOW E&M-EST. PATIENT-LVL IV: ICD-10-PCS | Mod: PBBFAC,,, | Performed by: STUDENT IN AN ORGANIZED HEALTH CARE EDUCATION/TRAINING PROGRAM

## 2023-10-12 PROCEDURE — 3075F PR MOST RECENT SYSTOLIC BLOOD PRESS GE 130-139MM HG: ICD-10-PCS | Mod: CPTII,S$GLB,, | Performed by: STUDENT IN AN ORGANIZED HEALTH CARE EDUCATION/TRAINING PROGRAM

## 2023-10-12 PROCEDURE — 1159F PR MEDICATION LIST DOCUMENTED IN MEDICAL RECORD: ICD-10-PCS | Mod: CPTII,S$GLB,, | Performed by: STUDENT IN AN ORGANIZED HEALTH CARE EDUCATION/TRAINING PROGRAM

## 2023-10-12 PROCEDURE — 3008F PR BODY MASS INDEX (BMI) DOCUMENTED: ICD-10-PCS | Mod: CPTII,S$GLB,, | Performed by: STUDENT IN AN ORGANIZED HEALTH CARE EDUCATION/TRAINING PROGRAM

## 2023-10-12 PROCEDURE — 3078F DIAST BP <80 MM HG: CPT | Mod: CPTII,S$GLB,, | Performed by: STUDENT IN AN ORGANIZED HEALTH CARE EDUCATION/TRAINING PROGRAM

## 2023-10-12 PROCEDURE — 3078F PR MOST RECENT DIASTOLIC BLOOD PRESSURE < 80 MM HG: ICD-10-PCS | Mod: CPTII,S$GLB,, | Performed by: STUDENT IN AN ORGANIZED HEALTH CARE EDUCATION/TRAINING PROGRAM

## 2023-10-12 PROCEDURE — 99999 PR PBB SHADOW E&M-EST. PATIENT-LVL IV: CPT | Mod: PBBFAC,,, | Performed by: STUDENT IN AN ORGANIZED HEALTH CARE EDUCATION/TRAINING PROGRAM

## 2023-10-12 NOTE — PATIENT INSTRUCTIONS
[email or web address]    Recommend the following:      Goal of at least 150 minutes aerobic exercise weekly. Recommend 30 mins, 5 days weekly.      Target heart rate while performing aerobic activity 157 bpm.       Monitoring caloric intake for 1-2 days weekly, with a goal of 1600 - 1800 kcal/day.       Goal carbohydrates <200 grams per day.       Can try My Fitness Pal or Fat Secret apps to help track nutrition.       Organizing plate with half vegetables, quarter whole grain starches or starchy vegetables, and quarter lean protein.       Goal of at least 1 vegetarian meal weekly and emphasizing fresh vegetables and fruits in diet.       Weight loss goal of 1-2 lbs per week. Goal by next appt in 12 weeks is 15 lbs.        Increasing healthy based fats such as avocados, olive oil, nuts, and freshwater, cold-water fish.      Decreasing red meat to no more than once weekly.       RTC in 3 months for weight management follow up.

## 2023-10-12 NOTE — PROGRESS NOTES
Subjective:       Patient ID: Shaina Mas is a 37 y.o. female.    Chief Complaint: Prediabetes [R73.03]    Patient is established with me, here today for the following:    Weight management -    Dietary:   2 meals per day  Breakfast: usually skips. Occasionally yogurt, Northern Irish thin bread and cheese. Weekends, eggs with avocado and bread.  Lunch: Eggs, salad, dinner left overs, sandwich.   Dinner: Meat, chicken or beef. Tomato sauce with meat. Pasta or rice sometimes. Vegetables. Steak, mashed potatoes, and broccoli.   If eats out will eat mediterranean food. Hummus, rice, meat, salad. Mexican food with meat.  Doesn't eat fried food.   Not routinely snacking. Occasional yogurt or tangerine.   Drinking mostly water. Sweet tea frequently. Coffee plain in the morning.  BMR: 2,009 kcal/day  Goal calories per day: 1600 - 1800 kcal/day    Exercise:  Not routinely exercising.   Target HR: 157 bpm    Never previously has tried weight reduction medication.  No prior weight reduction surgeries    Wt Readings from Last 10 Encounters:  10/12/23 : 134.4 kg (296 lb 4.8 oz)  08/16/23 : 130 kg (286 lb 9.6 oz)  08/15/23 : 130.6 kg (288 lb)  06/13/23 : 130.6 kg (288 lb)  06/06/23 : 130.4 kg (287 lb 7.7 oz)  05/23/23 : 130.8 kg (288 lb 5.8 oz)  05/11/23 : 132.3 kg (291 lb 12.5 oz)  04/13/23 : 132.1 kg (291 lb 3.6 oz)  04/04/23 : 131.4 kg (289 lb 11 oz)  03/30/23 : 131.9 kg (290 lb 12.6 oz)      Lab Results       Component                Value               Date                       HGBA1C                   6.2 (H)             12/29/2022            Lab Results       Component                Value               Date                       HDL                      46                  12/29/2022            Lab Results       Component                Value               Date                       TRIG                     242 (H)             12/29/2022              Metabolic syndrome:   Waist circumference   Triglycerides >150 mg/dL  HDL  cholesterol <50 mg/dL  Prediabetes       Review of Systems   Constitutional:  Positive for appetite change. Negative for unexpected weight change.         Current Outpatient Medications   Medication Instructions    albuterol (PROVENTIL/VENTOLIN HFA) 90 mcg/actuation inhaler 2 puffs, Inhalation, Every 6 hours PRN, Rescue    ammonium lactate 12 % Crea Apply twice daily to affected parts both feet as needed.    azelastine (ASTELIN) 137 mcg, Nasal, 2 times daily    benzonatate (TESSALON) 100 mg, Oral, 3 times daily PRN    fluticasone propionate (FLONASE) 50 mcg, Each Nostril, 2 times daily    montelukast (SINGULAIR) 10 mg tablet TAKE 1 TABLET BY MOUTH EVERY DAY IN THE EVENING    mv-min/iron/folic/calcium/vitK (WOMEN'S MULTIVITAMIN ORAL) Oral    norelgestromin-ethinyl estradiol 150-35 mcg/24 hr 1 patch, Transdermal, Weekly, CHANGE PATCH Q WEEK X 3 WEEKS THEN ONE WEEK OFF WITH NO PATCH     Objective:      Vitals:    10/12/23 1406   BP: 135/78   Pulse: 97   SpO2: 98%   Weight: 134.4 kg (296 lb 4.8 oz)   PainSc: 0-No pain     Body mass index is 47.82 kg/m².    Physical Exam  Vitals reviewed.   Constitutional:       General: She is not in acute distress.     Appearance: She is obese. She is not ill-appearing or diaphoretic.   Pulmonary:      Effort: Pulmonary effort is normal.   Skin:     General: Skin is warm and dry.   Neurological:      Mental Status: She is alert. Mental status is at baseline.   Psychiatric:         Mood and Affect: Mood normal.         Behavior: Behavior normal.         Assessment:       1. Prediabetes        Plan:       Prediabetes  Discussed GLP1 vs Qsymia for weight reduction, interest in potentially starting GLP1  Would like to review medication information regarding Wegovy, will notify office if interested in starting  Recommend the following:      Goal of at least 150 minutes aerobic exercise weekly. Recommend 30 mins, 5 days weekly.      Target heart rate while performing aerobic activity 157  bpm.       Monitoring caloric intake for 1-2 days weekly, with a goal of 1600 - 1800 kcal/day.       Goal carbohydrates <200 grams per day.       Can try My Fitness Pal or Fat Secret apps to help track nutrition.       Organizing plate with half vegetables, quarter whole grain starches or starchy vegetables, and quarter lean protein.       Goal of at least 1 vegetarian meal weekly and emphasizing fresh vegetables and fruits in diet.       Weight loss goal of 1-2 lbs per week. Goal by next appt in 12 weeks is 15 lbs.        Increasing healthy based fats such as avocados, olive oil, nuts, and freshwater, cold-water fish.      Decreasing red meat to no more than once weekly.       RTC in 3 months for weight management follow up.   -     Ambulatory referral/consult to Diabetes Education; Future      44 minutes were spent in chart review, documentation and review of results, and evaluation, treatment, and counseling of patient on the same day of service.    Bernadette Reich MD  10/12/2023

## 2023-10-16 ENCOUNTER — OFFICE VISIT (OUTPATIENT)
Dept: URGENT CARE | Facility: CLINIC | Age: 38
End: 2023-10-16
Payer: OTHER MISCELLANEOUS

## 2023-10-16 VITALS
HEIGHT: 64 IN | BODY MASS INDEX: 49.31 KG/M2 | WEIGHT: 288.81 LBS | TEMPERATURE: 98 F | SYSTOLIC BLOOD PRESSURE: 137 MMHG | HEART RATE: 83 BPM | DIASTOLIC BLOOD PRESSURE: 75 MMHG | OXYGEN SATURATION: 99 %

## 2023-10-16 DIAGNOSIS — Z02.6 ENCOUNTER RELATED TO WORKER'S COMPENSATION CLAIM: ICD-10-CM
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
## 2023-10-16 DIAGNOSIS — S80.11XA CONTUSION OF RIGHT LOWER EXTREMITY, INITIAL ENCOUNTER: Primary | ICD-10-CM

## 2023-10-16 DIAGNOSIS — S46.911A STRAIN OF RIGHT SHOULDER, INITIAL ENCOUNTER: ICD-10-CM

## 2023-10-16 PROCEDURE — 73030 XR SHOULDER COMPLETE 2 OR MORE VIEWS RIGHT: ICD-10-PCS | Mod: RT,S$GLB,, | Performed by: RADIOLOGY

## 2023-10-16 PROCEDURE — 99203 OFFICE O/P NEW LOW 30 MIN: CPT | Mod: S$GLB,,, | Performed by: PHYSICIAN ASSISTANT

## 2023-10-16 PROCEDURE — 99203 PR OFFICE/OUTPT VISIT, NEW, LEVL III, 30-44 MIN: ICD-10-PCS | Mod: S$GLB,,, | Performed by: PHYSICIAN ASSISTANT

## 2023-10-16 PROCEDURE — 73590 X-RAY EXAM OF LOWER LEG: CPT | Mod: RT,S$GLB,, | Performed by: RADIOLOGY

## 2023-10-16 PROCEDURE — 73590 XR TIBIA FIBULA 2 VIEW RIGHT: ICD-10-PCS | Mod: RT,S$GLB,, | Performed by: RADIOLOGY

## 2023-10-16 PROCEDURE — 73030 X-RAY EXAM OF SHOULDER: CPT | Mod: RT,S$GLB,, | Performed by: RADIOLOGY

## 2023-10-16 RX ORDER — IBUPROFEN 600 MG/1
600 TABLET ORAL EVERY 6 HOURS PRN
Qty: 30 TABLET | Refills: 0 | Status: SHIPPED | OUTPATIENT
Start: 2023-10-16

## 2023-10-16 NOTE — PROGRESS NOTES
Subjective:      Patient ID: Shaina Mas is a 37 y.o. female.    Chief Complaint: Shoulder Injury (RIGHT SHOULDER,), Elbow Injury (RIGHT ELBOW), and Leg Injury (RIGHT LEG)    KW  Patient's place of employment - [employer]  Patient's job title - TEACHER  Date of injury - 10-  Body part injured including left or right - RIGHT,SHOULDER,ELBOW,LEG  Injury Mechanism - FALL  What they were doing when they got hurt - PATIENT STATES SHE FALL OVER A BIG PUMPKIN ON A FIELD TRIP  What they did immediately after - CAME STRAIGHT HERE  Pain scale right now - 8    37-year-old right-hand dominant female presents with acute right leg and right shoulder pain after tripping and falling on her right side earlier today.  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  She reports pain in the right trapezius region as well as pain and bruising on the right lateral proximal leg.    Shoulder Injury   Pertinent negatives include no numbness.   Elbow Injury  Associated symptoms include arthralgias. Pertinent negatives include no neck pain or numbness.     Constitution: Positive for activity change.   HENT:  Negative for facial trauma.    Neck: Negative for neck pain.   Cardiovascular:  Negative for chest trauma.   Respiratory:  Negative for shortness of breath.    Gastrointestinal:  Negative for abdominal trauma.   Musculoskeletal:  Positive for pain, trauma and joint pain.   Skin:  Positive for bruising. Negative for wound.   Neurological:  Negative for numbness and tingling.     Objective:     Physical Exam  Vitals and nursing note reviewed.   Constitutional:       General: She is not in acute distress.     Appearance: She is well-developed. She is not diaphoretic.   HENT:      Head: Normocephalic and atraumatic.      Right Ear: Hearing and external ear normal.      Left Ear: Hearing and external ear normal.      Nose: Nose normal. No nasal deformity.   Eyes:       General: Lids are normal. No scleral icterus.     Conjunctiva/sclera: Conjunctivae normal.   Neck:      Trachea: Trachea normal.   Cardiovascular:      Pulses: Normal pulses.   Pulmonary:      Effort: Pulmonary effort is normal. No respiratory distress.      Breath sounds: No stridor.   Musculoskeletal:      Right shoulder: Tenderness present. No swelling or deformity. Normal range of motion. Normal pulse.        Arms:       Cervical back: Normal range of motion.      Right knee: Normal range of motion. No tenderness.      Right lower leg: Swelling and tenderness present.        Legs:    Skin:     General: Skin is warm and dry.      Capillary Refill: Capillary refill takes less than 2 seconds.   Neurological:      Mental Status: She is alert. She is not disoriented.      GCS: GCS eye subscore is 4. GCS verbal subscore is 5. GCS motor subscore is 6.      Sensory: No sensory deficit.   Psychiatric:         Attention and Perception: She is attentive.         Speech: Speech normal.         Behavior: Behavior normal.          X-ray Shoulder 2 or More Views Right    Result Date: 10/16/2023  EXAMINATION: XR SHOULDER COMPLETE 2 OR MORE VIEWS RIGHT CLINICAL HISTORY: Unspecified fall, initial encounter TECHNIQUE: Two or three views of the right shoulder were performed. COMPARISON: None FINDINGS: Three views right shoulder. The right humeral head maintains appropriate relationship with the glenoid.  The acromioclavicular joint is intact.  No acute displaced right rib fracture.  The right lung zones are grossly clear.     1. No acute displaced fracture or dislocation of the right shoulder. Electronically signed by: Nigel Vaz MD Date:    10/16/2023 Time:    14:39    X-Ray Tibia Fibula 2 View Right    Result Date: 10/16/2023  EXAMINATION: XR TIBIA FIBULA 2 VIEW RIGHT CLINICAL HISTORY: pain;  Unspecified fall, initial encounter TECHNIQUE: AP and lateral views of the right tibia and fibula were performed. COMPARISON: None.  FINDINGS: Four views tibia fibula. No acute displaced fracture or dislocation of the tibia or fibula.  No radiopaque foreign body.  No significant edema.  The knee is intact.  The ankle mortise is intact.     1. No acute displaced fracture or dislocation of the right tibia or fibula. Electronically signed by: Nigel Vaz MD Date:    10/16/2023 Time:    14:38     Assessment:      1. Contusion of right lower extremity, initial encounter    2. Encounter related to worker's compensation claim    3. Fall, initial encounter    4. Strain of right shoulder, initial encounter    5. Work related injury      Plan:       Medications Ordered This Encounter   Medications    ibuprofen (ADVIL,MOTRIN) 600 MG tablet     Sig: Take 1 tablet (600 mg total) by mouth every 6 (six) hours as needed for Pain. Take with meals.     Dispense:  30 tablet     Refill:  0     Patient Instructions: Daily home exercises/warm soaks (Apply ice to leg 2 to 3 times a day for 30 minutes.)   Restrictions: Home today, Regular Duty (Begin regular duty 10/17/2023.)  Follow up in about 1 week (around 10/23/2023).

## 2023-10-16 NOTE — LETTER
Urgent Care - [address] [address]  Phone: [phone]  Fax: [phone]  [facility]: [id number]    Pt Name: Shaina Mas  Injury Date: 10/16/2023   Employee ID:-[id number] Date of First Treatment: 10/16/2023   Company: [employer]      Appointment Time: 11:50 AM Arrived: 12:15 PM   Provider: Mio Castaneda PA-C Time Out:2:02 PM     Office Treatment:   1. Contusion of right lower extremity, initial encounter    2. Encounter related to worker's compensation claim    3. Fall, initial encounter    4. Strain of right shoulder, initial encounter    5. Work related injury      Medications Ordered This Encounter   Medications    ibuprofen (ADVIL,MOTRIN) 600 MG tablet      Patient Instructions: Daily home exercises/warm soaks (Apply ice to leg 2 to 3 times a day for 30 minutes.)    Restrictions: Home today, Regular Duty (Begin regular duty 10/17/2023.)     Return Appointment: 10/23/2023 at 10:40 AM\        KW

## 2023-10-19 ENCOUNTER — CLINICAL SUPPORT (OUTPATIENT)
Dept: DIABETES | Facility: CLINIC | Age: 38
End: 2023-10-19
Payer: COMMERCIAL

## 2023-10-19 DIAGNOSIS — R73.03 PREDIABETES: ICD-10-CM

## 2023-10-19 PROCEDURE — 99999 PR PBB SHADOW E&M-EST. PATIENT-LVL II: ICD-10-PCS | Mod: PBBFAC,,, | Performed by: DIETITIAN, REGISTERED

## 2023-10-19 PROCEDURE — G0108 PR DIAB MANAGE TRN  PER INDIV: ICD-10-PCS | Mod: S$GLB,,, | Performed by: DIETITIAN, REGISTERED

## 2023-10-19 PROCEDURE — 99999 PR PBB SHADOW E&M-EST. PATIENT-LVL II: CPT | Mod: PBBFAC,,, | Performed by: DIETITIAN, REGISTERED

## 2023-10-19 PROCEDURE — G0108 DIAB MANAGE TRN  PER INDIV: HCPCS | Mod: S$GLB,,, | Performed by: DIETITIAN, REGISTERED

## 2023-10-20 VITALS — HEIGHT: 64 IN | BODY MASS INDEX: 50.02 KG/M2 | WEIGHT: 293 LBS

## 2023-10-20 NOTE — PROGRESS NOTES
"Diabetes Care Specialist Progress Note  Author: Bambi Hall RD, CDE  Date: 10/20/2023    Program Intake  Reason for Diabetes Program Visit:: Intervention  Type of Intervention:: Individual  Individual: Education  Education: Self-Management Skill Review  Current diabetes risk level:: low  In the last 12 months, have you:: none    Lab Results   Component Value Date    HGBA1C 6.2 (H) 12/29/2022       Clinical    Weight: 134.2 kg (295 lb 13.7 oz)   Height: 5' 4" (162.6 cm)   Body mass index is 50.78 kg/m².    Current DM meds:  Not on any dm meds  Noted Dr. Reich discussed GLP with pt at last office visit and pt reports considering it. Discussed basic MOA, benefits and common s/e.      Assessment Summary and Plan    Based on today's diabetes care assessment, the following areas of need were identified:          4/11/2023    12:01 AM   Social   Support No   Access to Mass Media/Tech No   Cognitive/Behavioral Health No   Culture/Anabaptism Yes   Communication No   Health Literacy No            4/11/2023    12:01 AM   Clinical   Lab Compliance No            4/11/2023    12:01 AM   Diabetes Self-Management Skills   Diabetes Disease Process/Treatment Options Yes - Discussed prediabetes pathophysiology and insulin resistance.   Nutrition/Healthy Eating Yes - see care planning   Physical Activity/Exercise Yes - see care planning          Today's interventions were provided through individual discussion, instruction, and written materials were provided.      Patient verbalized understanding of instruction and written materials.  Pt was able to return back demonstration of instructions today. Patient understood key points, needs reinforcement and further instruction.     Diabetes Self-Management Care Plan:    Today's Diabetes Self-Management Care Plan was developed with Shaina's input. Shaina has agreed to work toward the following goal(s) to improve his/her overall diabetes control.      Care Plan: Diabetes Management "   Updates made since 9/20/2023 12:00 AM        Problem: Healthy Eating         Goal: Pt will swap sweet tea for unsweet.    Start Date: 4/11/2023   Expected End Date: 5/13/2023   This Visit's Progress: Met   Priority: High   Barriers: No Barriers Identified   Note:    4/11/23 - Pt has a consistent eating pattern with eating 3 times daily, does not snack, drinks a lot of water, and incorporates non-starchy vegetables. She does not like to cook, uses some convenience food items, and does minimal meal prep. Boyfriend does some cooking for dinner. Morning meal/snack is usually something quick at school (cookies or croissant if she stopped by [employer]) - discussed some quick breakfast choices that she could prepare at school and will try to move up meal to ~8 or 9 am. Lunch is either school lunch or brought from home (50/50). If she brings lunch from home, it is usually bigger portion of pasta and vegetables. Dinner is usually protein, starch, vegetable meal. Provided education on sources of CHO, choosing more vegetables/whole grains/fresh produce, portion sizes using dry measuring cups/food models/fist size for visual aid, plate method, and label reading. Discussed and reviewed examples of meal planning. Does drink some bottled sweet tea - likes brewed tea without sugar - discussed and encouraged swapping with unsweet or diet.     10/19/23 - She has stopped sweet tea completely. Working on drinking more water.        Goal: Will add a serving of non-starchy vegetables to meals and limit snack to 15-20g CHO.    Start Date: 10/19/2023   Expected End Date: 1/18/2024   Priority: High   Barriers: No Barriers Identified   Note:    10/19/23 - Reviewed sources of carbohydrate, choosing more complex/high fiber carbs vs simple carbs, portion sizes using dry measuring cups and food models for visual aid, label reading, and balancing meals with lean protein and non-starchy vegetables. Reviewed meal planning, plate method, and went  over some examples. Pt verbalizes realizes her portions are larger and does not eat that much non-starchy vegetables. Plans to add more non-starchy vegetables and will eat protein and vegetables first and carbs last at meals. Also eats some high carb snacks often - encouraged checking labels and limiting meals to 30-45gCHO and snacks to 15-20g.        Task: Reviewed the sources and role of Carbohydrate, Protein, and Fat and how each nutrient impacts blood sugar. Completed 10/20/2023        Task: Provided visual examples using dry measuring cups, food models, and other familiar objects such as computer mouse, deck or cards, tennis ball etc. to help with visualization of portions. Completed 10/20/2023        Task: Explained how to count carbohydrates using the food label and the use of dry measuring cups for accurate carb counting. Completed 10/20/2023        Task: Discussed strategies for choosing healthier menu options when dining out. Completed 10/20/2023        Task: Recommended replacing beverages containing high sugar content with noncaloric/sugar free options and/or water. Completed 10/20/2023        Task: Review the importance of balancing carbohydrates with each meal using portion control techniques to count servings of carbohydrate and label reading to identify serving size and amount of total carbs per serving. Completed 10/20/2023        Task: Provided Sample plate method and reviewed the use of the plate to estimate amounts of carbohydrate per meal. Completed 10/20/2023        Problem: Physical Activity and Exercise         Goal: Pt will start with adding at least 5 minutes of enjoyable movement daily.    Start Date: 4/11/2023   Expected End Date: 5/13/2023   This Visit's Progress: No change   Priority: Medium   Barriers: No Barriers Identified   Note:    4/11/23 - Pt is active at work but no other activities in the evening or weekends. Discussed benefits of regular physical activity on reducing insulin  resistance. Discussed some ideas to increase PA.     10/19/23 - Has not been able to add in activity yet. Work schedule is a barrier. Discussed some ideas for starting new habit. Pt's mom is coming for a visit, and she plans start going to workout at the gym with her.          Follow Up Plan     Follow up in about 3 months (around 1/19/2024) for 3 month follow-up/check in .    Today's care plan and follow up schedule was discussed with patient.  Shaina verbalized understanding of the care plan, goals, and agrees to follow up plan.        The patient was encouraged to communicate with his/her health care provider/physician and care team regarding his/her condition(s) and treatment.  I provided the patient with my contact information today and encouraged to contact me via phone or [facility] as needed.     Length of Visit   Total Time: 60 Minutes

## 2023-11-02 ENCOUNTER — OFFICE VISIT (OUTPATIENT)
Dept: URGENT CARE | Facility: CLINIC | Age: 38
End: 2023-11-02
Payer: COMMERCIAL

## 2023-11-02 VITALS
BODY MASS INDEX: 50.02 KG/M2 | SYSTOLIC BLOOD PRESSURE: 127 MMHG | HEIGHT: 64 IN | RESPIRATION RATE: 20 BRPM | DIASTOLIC BLOOD PRESSURE: 84 MMHG | TEMPERATURE: 98 F | HEART RATE: 115 BPM | WEIGHT: 293 LBS | OXYGEN SATURATION: 96 %

## 2023-11-02 DIAGNOSIS — R51.9 SINUS HEADACHE: ICD-10-CM

## 2023-11-02 DIAGNOSIS — R09.81 NASAL CONGESTION: ICD-10-CM

## 2023-11-02 DIAGNOSIS — J34.89 TENDERNESS OVER FRONTAL SINUS: ICD-10-CM

## 2023-11-02 DIAGNOSIS — R53.83 FATIGUE, UNSPECIFIED TYPE: Primary | ICD-10-CM

## 2023-11-02 DIAGNOSIS — R00.0 TACHYCARDIA: ICD-10-CM

## 2023-11-02 DIAGNOSIS — R68.83 CHILLS: ICD-10-CM

## 2023-11-02 LAB
CTP QC/QA: YES
CTP QC/QA: YES
POC MOLECULAR INFLUENZA A AGN: NEGATIVE
POC MOLECULAR INFLUENZA B AGN: NEGATIVE
SARS-COV-2 AG RESP QL IA.RAPID: NEGATIVE

## 2023-11-02 PROCEDURE — 87811 SARS CORONAVIRUS 2 ANTIGEN POCT, MANUAL READ: ICD-10-PCS | Mod: QW,S$GLB,, | Performed by: NURSE PRACTITIONER

## 2023-11-02 PROCEDURE — 99213 OFFICE O/P EST LOW 20 MIN: CPT | Mod: S$GLB,,, | Performed by: NURSE PRACTITIONER

## 2023-11-02 PROCEDURE — 87502 POCT INFLUENZA A/B MOLECULAR: ICD-10-PCS | Mod: QW,S$GLB,, | Performed by: NURSE PRACTITIONER

## 2023-11-02 PROCEDURE — 87811 SARS-COV-2 COVID19 W/OPTIC: CPT | Mod: QW,S$GLB,, | Performed by: NURSE PRACTITIONER

## 2023-11-02 PROCEDURE — 87502 INFLUENZA DNA AMP PROBE: CPT | Mod: QW,S$GLB,, | Performed by: NURSE PRACTITIONER

## 2023-11-02 PROCEDURE — 99213 PR OFFICE/OUTPT VISIT, EST, LEVL III, 20-29 MIN: ICD-10-PCS | Mod: S$GLB,,, | Performed by: NURSE PRACTITIONER

## 2023-11-02 RX ORDER — FLUTICASONE PROPIONATE 50 MCG
2 SPRAY, SUSPENSION (ML) NASAL DAILY PRN
Qty: 15.8 ML | Refills: 0 | Status: SHIPPED | OUTPATIENT
Start: 2023-11-02

## 2023-11-02 RX ORDER — AMOXICILLIN AND CLAVULANATE POTASSIUM 875; 125 MG/1; MG/1
1 TABLET, FILM COATED ORAL EVERY 12 HOURS
Qty: 14 TABLET | Refills: 0 | Status: SHIPPED | OUTPATIENT
Start: 2023-11-02 | End: 2023-11-09

## 2023-11-02 NOTE — PROGRESS NOTES
"Subjective:      Patient ID: Shaina Mas is a 38 y.o. female.    Vitals:  height is 5' 4" (1.626 m) and weight is 134.2 kg (295 lb 13.7 oz). Her oral temperature is 97.5 °F (36.4 °C). Her blood pressure is 127/84 and her pulse is 115 (abnormal). Her respiration is 20 and oxygen saturation is 96%.     Chief Complaint: Fever    Pt states her migrane started this morning and says the pain is a 9, it is in the front of her head and behind her eyes, she also feels very fatigued and weak with chills and fever, ibuprohen was taken.    38 year old female presents to clinic with complaints of fatigue, nasal congestion, chills and frontal headache rated at a 9 on a scale 0-10, treating with ibuprofen. History positive for Pfizer covid vaccine x 3 doses.     Migraine   This is a new problem. The current episode started today. The problem occurs constantly. The problem has been gradually worsening. The pain is located in the Frontal region. The quality of the pain is described as aching. The pain is at a severity of 9/10. The pain is severe. Associated symptoms include dizziness, nausea, sinus pressure and weakness. Pertinent negatives include no abdominal pain, fever or neck pain. Associated symptoms comments: Body aches and fatigue. Treatments tried: ibuprohen.     Constitution: Positive for fatigue. Negative for chills and fever.   HENT:  Positive for congestion, postnasal drip and sinus pressure.    Neck: Negative for neck pain.   Gastrointestinal:  Positive for nausea. Negative for abdominal pain.   Musculoskeletal:  Negative for muscle ache.   Neurological:  Positive for dizziness and headaches.      Objective:     Physical Exam   Constitutional: She is oriented to person, place, and time. She appears well-developed. She is cooperative.  Non-toxic appearance. She does not appear ill. No distress.   HENT:   Head: Normocephalic and atraumatic.   Ears:   Right Ear: Hearing, external ear and ear canal normal. Tympanic " membrane is bulging.   Left Ear: Hearing, external ear and ear canal normal. Tympanic membrane is bulging.   Nose: Mucosal edema and rhinorrhea present. No nasal deformity. No epistaxis. Right sinus exhibits frontal sinus tenderness. Right sinus exhibits no maxillary sinus tenderness. Left sinus exhibits frontal sinus tenderness. Left sinus exhibits no maxillary sinus tenderness.   Mouth/Throat: Uvula is midline, oropharynx is clear and moist and mucous membranes are normal. No trismus in the jaw. Normal dentition. No uvula swelling. No oropharyngeal exudate, posterior oropharyngeal edema or posterior oropharyngeal erythema.   Eyes: Conjunctivae and lids are normal. No scleral icterus.   Neck: Trachea normal and phonation normal. Neck supple. No edema present. No erythema present. No neck rigidity present.   Cardiovascular: Regular rhythm and normal heart sounds. Tachycardia present.   Pulmonary/Chest: Effort normal and breath sounds normal. No respiratory distress. She has no decreased breath sounds. She has no rhonchi.   Abdominal: Normal appearance.   Musculoskeletal: Normal range of motion.         General: No deformity. Normal range of motion.   Neurological: She is alert and oriented to person, place, and time. She exhibits normal muscle tone. Coordination normal.   Skin: Skin is warm, dry, intact, not diaphoretic and not pale.   Psychiatric: Her speech is normal and behavior is normal. Judgment and thought content normal.   Nursing note and vitals reviewed.      Assessment:     1. Fatigue, unspecified type    2. Nasal congestion    3. Chills    4. Tachycardia    5. Tenderness over frontal sinus    6. Sinus headache      Results for orders placed or performed in visit on 11/02/23   POCT Influenza A/B MOLECULAR   Result Value Ref Range    POC Molecular Influenza A Ag Negative Negative, Not Reported    POC Molecular Influenza B Ag Negative Negative, Not Reported     Acceptable Yes    SARS  Coronavirus 2 Antigen, POCT Manual Read   Result Value Ref Range    SARS Coronavirus 2 Antigen Negative Negative     Acceptable Yes       Plan:       Fatigue, unspecified type  -     POCT Influenza A/B MOLECULAR  -     SARS Coronavirus 2 Antigen, POCT Manual Read    Nasal congestion  -     amoxicillin-clavulanate 875-125mg (AUGMENTIN) 875-125 mg per tablet; Take 1 tablet by mouth every 12 (twelve) hours. for 7 days  Dispense: 14 tablet; Refill: 0  -     fluticasone propionate (FLONASE) 50 mcg/actuation nasal spray; 2 sprays (100 mcg total) by Each Nostril route daily as needed.  Dispense: 15.8 mL; Refill: 0    Chills    Tachycardia    Tenderness over frontal sinus  -     amoxicillin-clavulanate 875-125mg (AUGMENTIN) 875-125 mg per tablet; Take 1 tablet by mouth every 12 (twelve) hours. for 7 days  Dispense: 14 tablet; Refill: 0    Sinus headache  -     amoxicillin-clavulanate 875-125mg (AUGMENTIN) 875-125 mg per tablet; Take 1 tablet by mouth every 12 (twelve) hours. for 7 days  Dispense: 14 tablet; Refill: 0      Patient Instructions   Please drink plenty of fluids.  Please get plenty of rest.  Please return here or go to the Emergency Department for any concerns or worsening of condition.  It is ok to take over the counter plain Allegra or Claritin or Zyrtec.   If you do have Hypertension or palpitations, it is safe to take Coricidin HBP for relief of sinus symptoms.  We recommend you take Flonase (Fluticasone) or another nasally inhaled steroid unless you are already taking one.  Nasal irrigation with a saline spray or Netti Pot like device per their directions is also recommended.  If not allergic, please take over the counter Tylenol (Acetaminophen) and/or Motrin (Ibuprofen) as directed for control of pain and/or fever.  Please follow up with your primary care doctor or specialist as needed.    If you  smoke, please stop smoking.

## 2023-11-02 NOTE — LETTER
November 2, 2023      Urgent Care - [address] [address]  Phone: [phone]  Fax: [phone]       Patient: Shaina Mas   YOB: 1985  Date of Visit: 11/02/2023    To Whom It May Concern:    Nevaeh Mas  was at [facility] on 11/02/2023. The patient may return to work/school on 11/04/2023 with no restrictions. If you have any questions or concerns, or if I can be of further assistance, please do not hesitate to contact me.    Sincerely,     Liana Ford NP

## 2023-11-20 ENCOUNTER — OFFICE VISIT (OUTPATIENT)
Dept: INTERNAL MEDICINE | Facility: CLINIC | Age: 38
End: 2023-11-20
Payer: COMMERCIAL

## 2023-11-20 ENCOUNTER — CLINICAL SUPPORT (OUTPATIENT)
Dept: DIABETES | Facility: CLINIC | Age: 38
End: 2023-11-20
Payer: COMMERCIAL

## 2023-11-20 VITALS
SYSTOLIC BLOOD PRESSURE: 114 MMHG | DIASTOLIC BLOOD PRESSURE: 82 MMHG | WEIGHT: 293 LBS | OXYGEN SATURATION: 98 % | HEIGHT: 64 IN | HEART RATE: 85 BPM | BODY MASS INDEX: 50.02 KG/M2

## 2023-11-20 DIAGNOSIS — M25.511 ACUTE PAIN OF RIGHT SHOULDER: ICD-10-CM

## 2023-11-20 DIAGNOSIS — R73.03 PREDIABETES: Primary | ICD-10-CM

## 2023-11-20 DIAGNOSIS — M79.604 LOW BACK PAIN RADIATING TO RIGHT LOWER EXTREMITY: Primary | ICD-10-CM

## 2023-11-20 DIAGNOSIS — M54.50 LOW BACK PAIN RADIATING TO RIGHT LOWER EXTREMITY: Primary | ICD-10-CM

## 2023-11-20 PROCEDURE — 1159F MED LIST DOCD IN RCRD: CPT | Mod: CPTII,S$GLB,, | Performed by: PHYSICIAN ASSISTANT

## 2023-11-20 PROCEDURE — 3079F PR MOST RECENT DIASTOLIC BLOOD PRESSURE 80-89 MM HG: ICD-10-PCS | Mod: CPTII,S$GLB,, | Performed by: PHYSICIAN ASSISTANT

## 2023-11-20 PROCEDURE — 99214 OFFICE O/P EST MOD 30 MIN: CPT | Mod: S$GLB,,, | Performed by: PHYSICIAN ASSISTANT

## 2023-11-20 PROCEDURE — 99999 PR PBB SHADOW E&M-EST. PATIENT-LVL I: ICD-10-PCS | Mod: PBBFAC,,, | Performed by: DIETITIAN, REGISTERED

## 2023-11-20 PROCEDURE — 99214 PR OFFICE/OUTPT VISIT, EST, LEVL IV, 30-39 MIN: ICD-10-PCS | Mod: S$GLB,,, | Performed by: PHYSICIAN ASSISTANT

## 2023-11-20 PROCEDURE — G0108 PR DIAB MANAGE TRN  PER INDIV: ICD-10-PCS | Mod: S$GLB,,, | Performed by: DIETITIAN, REGISTERED

## 2023-11-20 PROCEDURE — 3074F PR MOST RECENT SYSTOLIC BLOOD PRESSURE < 130 MM HG: ICD-10-PCS | Mod: CPTII,S$GLB,, | Performed by: PHYSICIAN ASSISTANT

## 2023-11-20 PROCEDURE — 3008F PR BODY MASS INDEX (BMI) DOCUMENTED: ICD-10-PCS | Mod: CPTII,S$GLB,, | Performed by: PHYSICIAN ASSISTANT

## 2023-11-20 PROCEDURE — 99999 PR PBB SHADOW E&M-EST. PATIENT-LVL IV: ICD-10-PCS | Mod: PBBFAC,,, | Performed by: PHYSICIAN ASSISTANT

## 2023-11-20 PROCEDURE — 3008F BODY MASS INDEX DOCD: CPT | Mod: CPTII,S$GLB,, | Performed by: PHYSICIAN ASSISTANT

## 2023-11-20 PROCEDURE — 3074F SYST BP LT 130 MM HG: CPT | Mod: CPTII,S$GLB,, | Performed by: PHYSICIAN ASSISTANT

## 2023-11-20 PROCEDURE — 99999 PR PBB SHADOW E&M-EST. PATIENT-LVL I: CPT | Mod: PBBFAC,,, | Performed by: DIETITIAN, REGISTERED

## 2023-11-20 PROCEDURE — 3079F DIAST BP 80-89 MM HG: CPT | Mod: CPTII,S$GLB,, | Performed by: PHYSICIAN ASSISTANT

## 2023-11-20 PROCEDURE — 1159F PR MEDICATION LIST DOCUMENTED IN MEDICAL RECORD: ICD-10-PCS | Mod: CPTII,S$GLB,, | Performed by: PHYSICIAN ASSISTANT

## 2023-11-20 PROCEDURE — G0108 DIAB MANAGE TRN  PER INDIV: HCPCS | Mod: S$GLB,,, | Performed by: DIETITIAN, REGISTERED

## 2023-11-20 PROCEDURE — 99999 PR PBB SHADOW E&M-EST. PATIENT-LVL IV: CPT | Mod: PBBFAC,,, | Performed by: PHYSICIAN ASSISTANT

## 2023-11-20 RX ORDER — METHOCARBAMOL 500 MG/1
500 TABLET, FILM COATED ORAL 4 TIMES DAILY
Qty: 40 TABLET | Refills: 0 | Status: SHIPPED | OUTPATIENT
Start: 2023-11-20 | End: 2023-11-30

## 2023-11-20 NOTE — PROGRESS NOTES
Diabetes Care Specialist Progress Note  Author: Bambi Hall RD, CDE  Date: 11/20/2023    Program Intake  Reason for Diabetes Program Visit:: Intervention  Type of Intervention:: Individual  Individual: Education  Education: Self-Management Skill Review  Current diabetes risk level:: low  In the last 12 months, have you:: none    Lab Results   Component Value Date    HGBA1C 6.2 (H) 12/29/2022         Today's interventions were provided through individual discussion, instruction, and written materials were provided.      Patient verbalized understanding of instruction and written materials.  Pt was able to return back demonstration of instructions today. Patient understood key points, needs reinforcement and further instruction.     Diabetes Self-Management Care Plan:    Today's Diabetes Self-Management Care Plan was developed with Shaina's input. Shaina has agreed to work toward the following goal(s) to improve his/her overall diabetes control.      Care Plan: Diabetes Management   Updates made since 10/21/2023 12:00 AM        Problem: Healthy Eating         Goal: Pt will swap sweet tea for unsweet. Completed 11/20/2023   Start Date: 4/11/2023   Expected End Date: 5/13/2023   Recent Progress: Met   Priority: High   Barriers: No Barriers Identified   Note:    4/11/23 - Pt has a consistent eating pattern with eating 3 times daily, does not snack, drinks a lot of water, and incorporates non-starchy vegetables. She does not like to cook, uses some convenience food items, and does minimal meal prep. Boyfriend does some cooking for dinner. Morning meal/snack is usually something quick at school (cookies or croissant if she stopped by [employer]) - discussed some quick breakfast choices that she could prepare at school and will try to move up meal to ~8 or 9 am. Lunch is either school lunch or brought from home (50/50). If she brings lunch from home, it is usually bigger portion of pasta and vegetables. Dinner is usually  protein, starch, vegetable meal. Provided education on sources of CHO, choosing more vegetables/whole grains/fresh produce, portion sizes using dry measuring cups/food models/fist size for visual aid, plate method, and label reading. Discussed and reviewed examples of meal planning. Does drink some bottled sweet tea - likes brewed tea without sugar - discussed and encouraged swapping with unsweet or diet.     10/19/23 - She has stopped sweet tea completely. Working on drinking more water.        Goal: Will add a serving of non-starchy vegetables to meals and limit snack to 15-20g CHO.    Start Date: 10/19/2023   Expected End Date: 1/18/2024   Priority: High   Barriers: No Barriers Identified   Note:    10/19/23 - Reviewed sources of carbohydrate, choosing more complex/high fiber carbs vs simple carbs, portion sizes using dry measuring cups and food models for visual aid, label reading, and balancing meals with lean protein and non-starchy vegetables. Reviewed meal planning, plate method, and went over some examples. Pt verbalizes realizes her portions are larger and does not eat that much non-starchy vegetables. Plans to add more non-starchy vegetables and will eat protein and vegetables first and carbs last at meals. Also eats some high carb snacks often - encouraged checking labels and limiting meals to 30-45gCHO and snacks to 15-20g.     11/20/23 - Eating a lot more non-starchy vegetables but finding she is having some loose BMs. Discussed try logging meal or two she has prior to episodes of diarrhea and see if a pattern is noticeable. If d/t very large amounts of non-starchy vegetables, can cut down on portion and balance with protein and carb. Also, can try increasing probiotic-rich foods like having a low- sugar yogurt daily for snack.        Problem: Physical Activity and Exercise         Goal: Pt will start with adding at least 5 minutes of enjoyable movement daily.    Start Date: 4/11/2023   Expected End  Date: 5/13/2023   This Visit's Progress: On track   Recent Progress: No change   Priority: Medium   Barriers: No Barriers Identified   Note:    4/11/23 - Pt is active at work but no other activities in the evening or weekends. Discussed benefits of regular physical activity on reducing insulin resistance. Discussed some ideas to increase PA.     10/19/23 - Has not been able to add in activity yet. Work schedule is a barrier. Discussed some ideas for starting new habit. Pt's mom is coming for a visit, and she plans start going to workout at the gym with her.     11/20/23 - Mom didn't get to come visit after all. Has not yet started exercise. Purchased a treadmill. Plans to start walking on treadmill or go swimming at least 3x/week. Has a friend who will go swimming with her. Discussed setting small goal to get started, keep threshold to start low, and building up as she can. Pt is interested in finding out exactly % lean mass compared to body fat. Explained would require metabolic testing - may be able to have done with RD at [facility]. Provided contact information.         Follow Up Plan     Follow up in about 3 months (around 2/20/2024) for check in after next A1C.    Today's care plan and follow up schedule was discussed with patient.  Shaina verbalized understanding of the care plan, goals, and agrees to follow up plan.        The patient was encouraged to communicate with his/her health care provider/physician and care team regarding his/her condition(s) and treatment.  I provided the patient with my contact information today and encouraged to contact me via phone or [facility] Portal as needed.     Length of Visit   Total Time: 30 Minutes

## 2023-11-20 NOTE — PROGRESS NOTES
INTERNAL MEDICINE URGENT VISIT NOTE    CHIEF COMPLAINT     Chief Complaint   Patient presents with    Back Pain    Shoulder Pain     Right       HPI     Shaina Mas is a 38 y.o. female who presents for an urgent visit today.    PCP is Bernadette Reich MD, patient is known to me.     She presents today with two complaints.   She has chronic lower back pain that radiates to the right leg. She has been seeing chiropractor for almost one year with no relief - reports that pain started about three years ago.   No associated fever, chills, nausea, vomiting, no bladder or bowel changes.No numbness or tingling - pain better with NSAIDS PRN but only temporarily - has never seen PT, Ortho, pain mgmt, or spine for these symptoms.       Also with complaints of right shoulder pain that has been present for the past 3 weeks. No trauma or injury. Pain is worse int he AM, worse with moving right arm up and out. No numbness, tingling to swelling to the right arm.         Past Medical History:  Past Medical History:   Diagnosis Date    Prediabetes     Smoker        Home Medications:  Prior to Admission medications    Medication Sig Start Date End Date Taking? Authorizing Provider   albuterol (PROVENTIL/VENTOLIN HFA) 90 mcg/actuation inhaler Inhale 2 puffs into the lungs every 6 (six) hours as needed for Wheezing or Shortness of Breath. Rescue 5/11/23  Yes Bernadette Reich MD   ammonium lactate 12 % Crea Apply twice daily to affected parts both feet as needed. 7/19/22  Yes Cyrus Moses DPM   azelastine (ASTELIN) 137 mcg (0.1 %) nasal spray 1 spray (137 mcg total) by Nasal route 2 (two) times daily. 6/13/23 1/17/27 Yes Nigel Martinez MD   benzonatate (TESSALON) 100 MG capsule Take 1 capsule (100 mg total) by mouth 3 (three) times daily as needed for Cough. 6/6/23  Yes April Louis PA-C   fluticasone propionate (FLONASE) 50 mcg/actuation nasal spray 2 sprays (100 mcg total) by Each Nostril route daily as needed. 11/2/23   "Yes Liana Ford, NP   ibuprofen (ADVIL,MOTRIN) 600 MG tablet Take 1 tablet (600 mg total) by mouth every 6 (six) hours as needed for Pain. Take with meals. 10/16/23  Yes Mio Castaneda PA-C   montelukast (SINGULAIR) 10 mg tablet TAKE 1 TABLET BY MOUTH EVERY DAY IN THE EVENING 1/20/23  Yes April Louis PA-C   mv-min/iron/folic/calcium/vitK (WOMEN'S MULTIVITAMIN ORAL) Take by mouth.   Yes Provider, Historical   norelgestromin-ethinyl estradiol 150-35 mcg/24 hr Place 1 patch onto the skin once a week. CHANGE PATCH Q WEEK X 3 WEEKS THEN ONE WEEK OFF WITH NO PATCH 3/10/23  Yes Felix St Jr., MD       Review of Systems:  Review of Systems   Constitutional:  Negative for chills and fever.   HENT:  Negative for sore throat and trouble swallowing.    Eyes:  Negative for visual disturbance.   Respiratory:  Negative for cough and shortness of breath.    Cardiovascular:  Negative for chest pain.   Gastrointestinal:  Negative for abdominal pain, constipation, diarrhea, nausea and vomiting.   Genitourinary:  Negative for dysuria and flank pain.   Musculoskeletal:  Positive for back pain. Negative for neck pain and neck stiffness.        Right shoulder pain      Skin:  Negative for rash.   Neurological:  Negative for dizziness, syncope, weakness and headaches.   Psychiatric/Behavioral:  Negative for confusion.        Health Maintainence:   Immunizations:  Health Maintenance         Date Due Completion Date    TETANUS VACCINE Never done ---    Influenza Vaccine (1) Never done ---    COVID-19 Vaccine (6 - 2023-24 season) 09/01/2023 1/14/2022    Hemoglobin A1c (Prediabetes) 12/29/2023 12/29/2022    Cervical Cancer Screening 06/16/2025 6/16/2022             PHYSICAL EXAM     /82 (BP Location: Left arm, Patient Position: Sitting, BP Method: X-Large (Manual))   Pulse 85   Ht 5' 4" (1.626 m)   Wt 134 kg (295 lb 6.7 oz)   LMP 11/07/2023 (Approximate)   SpO2 98%   BMI 50.71 kg/m²     Physical Exam  Vitals " and nursing note reviewed.   Constitutional:       Appearance: Normal appearance.      Comments: Obese female in NAD or apparent pain. She makes good eye contact, speaks in clear full sentences and ambulates with ease.    HENT:      Head: Normocephalic and atraumatic.      Nose: Nose normal.      Mouth/Throat:      Pharynx: Oropharynx is clear.   Eyes:      Conjunctiva/sclera: Conjunctivae normal.   Cardiovascular:      Rate and Rhythm: Normal rate and regular rhythm.      Pulses: Normal pulses.   Pulmonary:      Effort: No respiratory distress.   Abdominal:      Tenderness: There is no abdominal tenderness.   Musculoskeletal:         General: Normal range of motion.      Cervical back: No rigidity.      Comments: No C T or L midline bony TTP crepitus or step-offs.   There is right sided lumbar paraspinal musculature TTP   There is also right sided trapezius TTP   There is painful but normal ROM of the right shoulder     Skin:     General: Skin is warm and dry.      Capillary Refill: Capillary refill takes less than 2 seconds.      Findings: No rash.   Neurological:      General: No focal deficit present.      Mental Status: She is alert.      Gait: Gait normal.   Psychiatric:         Mood and Affect: Mood normal.         LABS     Lab Results   Component Value Date    HGBA1C 6.2 (H) 12/29/2022     CMP  Sodium   Date Value Ref Range Status   12/29/2022 138 136 - 145 mmol/L Final     Potassium   Date Value Ref Range Status   12/29/2022 4.2 3.5 - 5.1 mmol/L Final     Chloride   Date Value Ref Range Status   12/29/2022 107 95 - 110 mmol/L Final     CO2   Date Value Ref Range Status   12/29/2022 26 23 - 29 mmol/L Final     Glucose   Date Value Ref Range Status   12/29/2022 118 (H) 70 - 110 mg/dL Final     BUN   Date Value Ref Range Status   12/29/2022 9 6 - 20 mg/dL Final     Creatinine   Date Value Ref Range Status   12/29/2022 0.7 0.5 - 1.4 mg/dL Final     Calcium   Date Value Ref Range Status   12/29/2022 8.7 8.7 -  10.5 mg/dL Final     Total Protein   Date Value Ref Range Status   12/29/2022 7.1 6.0 - 8.4 g/dL Final     Albumin   Date Value Ref Range Status   03/30/2023 3.8 3.6 - 5.1 g/dL Final     Total Bilirubin   Date Value Ref Range Status   12/29/2022 0.3 0.1 - 1.0 mg/dL Final     Comment:     For infants and newborns, interpretation of results should be based  on gestational age, weight and in agreement with clinical  observations.    Premature Infant recommended reference ranges:  Up to 24 hours.............<8.0 mg/dL  Up to 48 hours............<12.0 mg/dL  3-5 days..................<15.0 mg/dL  6-29 days.................<15.0 mg/dL       Alkaline Phosphatase   Date Value Ref Range Status   12/29/2022 60 55 - 135 U/L Final     AST   Date Value Ref Range Status   12/29/2022 19 10 - 40 U/L Final     ALT   Date Value Ref Range Status   12/29/2022 30 10 - 44 U/L Final     Anion Gap   Date Value Ref Range Status   12/29/2022 5 (L) 8 - 16 mmol/L Final     Lab Results   Component Value Date    WBC 6.09 12/29/2022    HGB 13.2 12/29/2022    HCT 40.9 12/29/2022    MCV 81 (L) 12/29/2022     12/29/2022     Lab Results   Component Value Date    CHOL 186 12/29/2022     Lab Results   Component Value Date    HDL 46 12/29/2022     Lab Results   Component Value Date    LDLCALC 91.6 12/29/2022     Lab Results   Component Value Date    TRIG 242 (H) 12/29/2022     Lab Results   Component Value Date    CHOLHDL 24.7 12/29/2022     Lab Results   Component Value Date    TSH 2.690 12/29/2022       ASSESSMENT/PLAN     Shaina Mas is a 38 y.o. female     Shaina was seen today for back pain and shoulder pain.    Diagnoses and all orders for this visit:    Low back pain radiating to right lower extremity  -     Ambulatory referral/consult to [facility] Sp[facility]; Future    Acute pain of right shoulder  -     Ambulatory referral/consult to Orthopedics; Future    Other orders  -     methocarbamoL (ROBAXIN) 500 MG Tab; Take 1 tablet (500 mg  total) by mouth 4 (four) times daily. for 10 days            . Patient was counseled on when and how to seek emergent care.       April Louis PA-C   Department of Internal Medicine - [facility]   9:27 AM

## 2023-11-21 ENCOUNTER — OFFICE VISIT (OUTPATIENT)
Dept: SPORTS MEDICINE | Facility: CLINIC | Age: 38
End: 2023-11-21
Payer: COMMERCIAL

## 2023-11-21 VITALS
BODY MASS INDEX: 50.02 KG/M2 | HEIGHT: 64 IN | SYSTOLIC BLOOD PRESSURE: 114 MMHG | DIASTOLIC BLOOD PRESSURE: 82 MMHG | WEIGHT: 293 LBS

## 2023-11-21 DIAGNOSIS — M75.21 BICEPS TENDINITIS OF RIGHT UPPER EXTREMITY: ICD-10-CM

## 2023-11-21 DIAGNOSIS — M25.511 CHRONIC RIGHT SHOULDER PAIN: ICD-10-CM

## 2023-11-21 DIAGNOSIS — G25.89 SCAPULAR DYSKINESIS: ICD-10-CM

## 2023-11-21 DIAGNOSIS — G89.29 CHRONIC RIGHT SHOULDER PAIN: ICD-10-CM

## 2023-11-21 DIAGNOSIS — M25.511 ARTHRALGIA OF RIGHT ACROMIOCLAVICULAR JOINT: Primary | ICD-10-CM

## 2023-11-21 PROCEDURE — 99999 PR PBB SHADOW E&M-EST. PATIENT-LVL III: CPT | Mod: PBBFAC,,, | Performed by: ORTHOPAEDIC SURGERY

## 2023-11-21 PROCEDURE — 3008F BODY MASS INDEX DOCD: CPT | Mod: CPTII,S$GLB,, | Performed by: ORTHOPAEDIC SURGERY

## 2023-11-21 PROCEDURE — 3074F SYST BP LT 130 MM HG: CPT | Mod: CPTII,S$GLB,, | Performed by: ORTHOPAEDIC SURGERY

## 2023-11-21 PROCEDURE — 20605 INTERMEDIATE JOINT ASPIRATION/INJECTION: R ACROMIOCLAVICULAR: ICD-10-PCS | Mod: RT,S$GLB,, | Performed by: ORTHOPAEDIC SURGERY

## 2023-11-21 PROCEDURE — 3079F PR MOST RECENT DIASTOLIC BLOOD PRESSURE 80-89 MM HG: ICD-10-PCS | Mod: CPTII,S$GLB,, | Performed by: ORTHOPAEDIC SURGERY

## 2023-11-21 PROCEDURE — 99204 OFFICE O/P NEW MOD 45 MIN: CPT | Mod: 25,S$GLB,, | Performed by: ORTHOPAEDIC SURGERY

## 2023-11-21 PROCEDURE — 99204 PR OFFICE/OUTPT VISIT, NEW, LEVL IV, 45-59 MIN: ICD-10-PCS | Mod: 25,S$GLB,, | Performed by: ORTHOPAEDIC SURGERY

## 2023-11-21 PROCEDURE — 3008F PR BODY MASS INDEX (BMI) DOCUMENTED: ICD-10-PCS | Mod: CPTII,S$GLB,, | Performed by: ORTHOPAEDIC SURGERY

## 2023-11-21 PROCEDURE — 20605 DRAIN/INJ JOINT/BURSA W/O US: CPT | Mod: RT,S$GLB,, | Performed by: ORTHOPAEDIC SURGERY

## 2023-11-21 PROCEDURE — 3074F PR MOST RECENT SYSTOLIC BLOOD PRESSURE < 130 MM HG: ICD-10-PCS | Mod: CPTII,S$GLB,, | Performed by: ORTHOPAEDIC SURGERY

## 2023-11-21 PROCEDURE — 99999 PR PBB SHADOW E&M-EST. PATIENT-LVL III: ICD-10-PCS | Mod: PBBFAC,,, | Performed by: ORTHOPAEDIC SURGERY

## 2023-11-21 PROCEDURE — 3079F DIAST BP 80-89 MM HG: CPT | Mod: CPTII,S$GLB,, | Performed by: ORTHOPAEDIC SURGERY

## 2023-11-21 RX ORDER — CELECOXIB 200 MG/1
200 CAPSULE ORAL DAILY
Qty: 30 CAPSULE | Refills: 1 | Status: SHIPPED | OUTPATIENT
Start: 2023-11-21

## 2023-11-21 RX ORDER — TRIAMCINOLONE ACETONIDE 40 MG/ML
40 INJECTION, SUSPENSION INTRA-ARTICULAR; INTRAMUSCULAR
Status: DISCONTINUED | OUTPATIENT
Start: 2023-11-21 | End: 2023-11-21 | Stop reason: HOSPADM

## 2023-11-21 RX ADMIN — TRIAMCINOLONE ACETONIDE 40 MG: 40 INJECTION, SUSPENSION INTRA-ARTICULAR; INTRAMUSCULAR at 01:11

## 2023-11-21 NOTE — PROGRESS NOTES
CC: Right shoulder pain     38 y.o. Female presents as a new patient to me. She  works as an 1st grade . Complaint is right shoulder pain for 4 months. Atraumatic onset.  No specific antecedent injury mechanism.  Insidious onset.  Localizes predominantly over the anterior superior shoulder.  But sometimes can be more diffuse.  Denies any prior discrete subluxation or dislocation events.  No instability symptoms.  Chief complaint of pain with subjective weakness.  No neck or radicular symptoms.  She does have some chronic back pain for which she has seen a chiropractor.  She feels that some of the chiropractic manipulations may have exacerbated the right shoulder.  She is right-hand dominant.  Treatment at this point has included activity modifications, oral medication and rest.  No injections.  No formal physical therapy.    BMI 51    PMHx notable for prediabetes .   Positive for tobacco.   Negative for diabetes.     PAST MEDICAL HISTORY:   Past Medical History:   Diagnosis Date    Prediabetes     Smoker      PAST SURGICAL HISTORY:  Past Surgical History:   Procedure Laterality Date    NO PAST SURGERIES       FAMILY HISTORY:  Family History   Problem Relation Age of Onset    Hypertension Mother     Diabetes Father     Hyperlipidemia Father     Hypertension Father     Allergies Brother     Osteoarthritis Brother     Allergies Brother     Anemia Maternal Grandmother     Cancer Maternal Grandfather     Diabetes Paternal Grandmother     Early death Paternal Grandfather     Colon cancer Neg Hx     Breast cancer Neg Hx     Ovarian cancer Neg Hx     Heart attack Neg Hx     Stroke Neg Hx     Osteoporosis Neg Hx      MEDICATIONS:    Current Outpatient Medications:     albuterol (PROVENTIL/VENTOLIN HFA) 90 mcg/actuation inhaler, Inhale 2 puffs into the lungs every 6 (six) hours as needed for Wheezing or Shortness of Breath. Rescue, Disp: 18 g, Rfl: 2    ammonium lactate 12 % Crea, Apply  "twice daily to affected parts both feet as needed., Disp: 140 g, Rfl: 11    azelastine (ASTELIN) 137 mcg (0.1 %) nasal spray, 1 spray (137 mcg total) by Nasal route 2 (two) times daily., Disp: 30 mL, Rfl: 11    benzonatate (TESSALON) 100 MG capsule, Take 1 capsule (100 mg total) by mouth 3 (three) times daily as needed for Cough., Disp: 30 capsule, Rfl: 0    celecoxib (CELEBREX) 200 MG capsule, Take 1 capsule (200 mg total) by mouth once daily., Disp: 30 capsule, Rfl: 1    fluticasone propionate (FLONASE) 50 mcg/actuation nasal spray, 2 sprays (100 mcg total) by Each Nostril route daily as needed., Disp: 15.8 mL, Rfl: 0    ibuprofen (ADVIL,MOTRIN) 600 MG tablet, Take 1 tablet (600 mg total) by mouth every 6 (six) hours as needed for Pain. Take with meals., Disp: 30 tablet, Rfl: 0    methocarbamoL (ROBAXIN) 500 MG Tab, Take 1 tablet (500 mg total) by mouth 4 (four) times daily. for 10 days, Disp: 40 tablet, Rfl: 0    montelukast (SINGULAIR) 10 mg tablet, TAKE 1 TABLET BY MOUTH EVERY DAY IN THE EVENING, Disp: 30 tablet, Rfl: 0    mv-min/iron/folic/calcium/vitK (WOMEN'S MULTIVITAMIN ORAL), Take by mouth., Disp: , Rfl:     norelgestromin-ethinyl estradiol 150-35 mcg/24 hr, Place 1 patch onto the skin once a week. CHANGE PATCH Q WEEK X 3 WEEKS THEN ONE WEEK OFF WITH NO PATCH, Disp: 3 patch, Rfl: 3    ALLERGIES:  Review of patient's allergies indicates:  No Known Allergies     REVIEW OF SYSTEMS:  Constitution: Negative. Negative for chills, fever and night sweats.    Hematologic/Lymphatic: Negative for bleeding problem. Does not bruise/bleed easily.   Skin: Negative for dry skin, itching and rash.   Musculoskeletal: Negative for falls. Positive for right shoulder pain and muscle weakness.     All other review of symptoms were reviewed and found to be noncontributory.     PHYSICAL EXAMINATION:  Vitals:  /82   Ht 5' 4" (1.626 m)   Wt 134 kg (295 lb 6.7 oz)   LMP 11/07/2023 (Approximate)   BMI 50.71 kg/m²  "   General: Well-developed well-nourished 38 y.o. femalein no acute distress   Cardiovascular: Regular rhythm by palpation of distal pulse, normal color and temperature, no concerning varicosities on symptomatic side   Lungs: No labored breathing or wheezing appreciated   Neuro: Alert and oriented ×3   Psychiatric: well oriented to person, place and time, demonstrates normal mood and affect   Skin: No rashes, lesions or ulcers, normal temperature, turgor, and texture on uninvolved extremity    Ortho/SPM Exam  Examination of the right shoulder demonstrates active forward elevation to 175, ER with arm at side to 60, IR to T10. Passive FE to 175, ER to 70. Prominent tenderness along the proximal biceps tendon. + AC tenderness.  Positive cross chest adduction maneuver for typical pain.  Localizes over the AC joint.  5-/5 resisted supraspinatus testing. 5/5 resisted infraspinatus testing. Negative belly press test.  Negative anterior apprehension test.  Stable to load and shift testing anteriorly and posteriorly.  Negative posterior push-pull maneuver.  Negative glenohumeral grind.  Negative compression rotation.  Scapular dyskinesis.  + Midline cervical neck tenderness. Negative Spurling's maneuver.     IMAGING:  Prior Xrays including AP, Outlet and Axillary Lateral of RIGHT shoulder are ordered / images reviewed by me:   No acute findings.  No significant degenerative changes.    ASSESSMENT:      ICD-10-CM ICD-9-CM   1. Arthralgia of right acromioclavicular joint  M25.511 719.41   2. Biceps tendinitis of right upper extremity  M75.21 726.12   3. Scapular dyskinesis  G25.89 781.3   4. Chronic right shoulder pain  M25.511 719.41    G89.29 338.29       PLAN:     Findings were discussed with the patient.  She has pain over the AC joint primarily and also over the proximal biceps groove with some underlying scapular dyskinesis.  Notably she has a stable shoulder otherwise and good cuff strength.  No pain on resisted cuff  testing.  Discussed treatment options and I have recommended some additional conservative treatment to include physical therapy and a prescription for Celebrex was given.  She has elected for a right AC joint corticosteroid injection under ultrasound guidance.  She will let us know how she is doing in the next 6-8 weeks.  If pain is persistent or recurrent to significant degree despite these measures, next step would be MRI.    Intermediate Joint Aspiration/Injection: R acromioclavicular    Date/Time: 11/21/2023 1:00 PM    Performed by: DEE Hampton MD  Authorized by: DEE Hampton MD    Consent Done?:  Yes (Verbal)  Indications:  Pain  Site marked: The procedure site was marked    Timeout: Prior to procedure the correct patient, procedure, and site was verified      Location:  Shoulder  Site:  R acromioclavicular  Prep: Patient was prepped and draped in usual sterile fashion    Needle size:  22 G  Medications:  40 mg triamcinolone acetonide 40 mg/mL  Patient tolerance:  Patient tolerated the procedure well with no immediate complications       Additional Comments: The ultrasound was used to gain visualization over the acromioclavicular joint.  A representative picture was taken and saved showing the distal clavicle and anatomy along with the needle introduced into the AC joint for the steroid injection.

## 2023-11-24 ENCOUNTER — TELEPHONE (OUTPATIENT)
Dept: PAIN MEDICINE | Facility: CLINIC | Age: 38
End: 2023-11-24
Payer: COMMERCIAL

## 2023-11-24 NOTE — TELEPHONE ENCOUNTER
Staff contacted patient about appointment on 11/27/23, patient answered, patient confirmed attendance for appointment.

## 2024-01-31 ENCOUNTER — LAB VISIT (OUTPATIENT)
Dept: LAB | Facility: OTHER | Age: 39
End: 2024-01-31
Attending: STUDENT IN AN ORGANIZED HEALTH CARE EDUCATION/TRAINING PROGRAM
Payer: COMMERCIAL

## 2024-01-31 ENCOUNTER — OFFICE VISIT (OUTPATIENT)
Dept: INTERNAL MEDICINE | Facility: CLINIC | Age: 39
End: 2024-01-31
Payer: COMMERCIAL

## 2024-01-31 VITALS
SYSTOLIC BLOOD PRESSURE: 118 MMHG | DIASTOLIC BLOOD PRESSURE: 90 MMHG | OXYGEN SATURATION: 98 % | HEART RATE: 80 BPM | BODY MASS INDEX: 51.24 KG/M2 | WEIGHT: 293 LBS

## 2024-01-31 DIAGNOSIS — Z13.6 SCREENING FOR CARDIOVASCULAR CONDITION: ICD-10-CM

## 2024-01-31 DIAGNOSIS — Z11.4 SCREENING FOR HIV WITHOUT PRESENCE OF RISK FACTORS: ICD-10-CM

## 2024-01-31 DIAGNOSIS — R73.03 PREDIABETES: ICD-10-CM

## 2024-01-31 DIAGNOSIS — D64.9 ANEMIA, UNSPECIFIED TYPE: ICD-10-CM

## 2024-01-31 DIAGNOSIS — K52.9 CHRONIC DIARRHEA: ICD-10-CM

## 2024-01-31 DIAGNOSIS — Z23 NEED FOR VACCINATION: ICD-10-CM

## 2024-01-31 DIAGNOSIS — N92.0 MENORRHAGIA WITH REGULAR CYCLE: ICD-10-CM

## 2024-01-31 DIAGNOSIS — Z30.8 ENCOUNTER FOR OTHER CONTRACEPTIVE MANAGEMENT: ICD-10-CM

## 2024-01-31 DIAGNOSIS — R07.89 RIGHT-SIDED CHEST WALL PAIN: ICD-10-CM

## 2024-01-31 DIAGNOSIS — Z00.00 HEALTH MAINTENANCE EXAMINATION: Primary | ICD-10-CM

## 2024-01-31 DIAGNOSIS — E55.9 VITAMIN D DEFICIENCY: ICD-10-CM

## 2024-01-31 DIAGNOSIS — Z00.00 HEALTH MAINTENANCE EXAMINATION: ICD-10-CM

## 2024-01-31 DIAGNOSIS — Z11.59 ENCOUNTER FOR HEPATITIS C SCREENING TEST FOR LOW RISK PATIENT: ICD-10-CM

## 2024-01-31 DIAGNOSIS — Z20.2 EXPOSURE TO SEXUALLY TRANSMITTED DISEASE (STD): ICD-10-CM

## 2024-01-31 LAB
25(OH)D3+25(OH)D2 SERPL-MCNC: 12 NG/ML (ref 30–96)
ALBUMIN SERPL BCP-MCNC: 4.1 G/DL (ref 3.5–5.2)
ALP SERPL-CCNC: 75 U/L (ref 55–135)
ALT SERPL W/O P-5'-P-CCNC: 28 U/L (ref 10–44)
ANION GAP SERPL CALC-SCNC: 8 MMOL/L (ref 8–16)
AST SERPL-CCNC: 18 U/L (ref 10–40)
B-HCG UR QL: NEGATIVE
BASOPHILS # BLD AUTO: 0.05 K/UL (ref 0–0.2)
BASOPHILS NFR BLD: 0.6 % (ref 0–1.9)
BILIRUB SERPL-MCNC: 0.1 MG/DL (ref 0.1–1)
BUN SERPL-MCNC: 8 MG/DL (ref 6–20)
BURR CELLS BLD QL SMEAR: ABNORMAL
CALCIUM SERPL-MCNC: 9.9 MG/DL (ref 8.7–10.5)
CHLORIDE SERPL-SCNC: 101 MMOL/L (ref 95–110)
CHOLEST SERPL-MCNC: 210 MG/DL (ref 120–199)
CHOLEST/HDLC SERPL: 4.9 {RATIO} (ref 2–5)
CO2 SERPL-SCNC: 29 MMOL/L (ref 23–29)
CREAT SERPL-MCNC: 0.7 MG/DL (ref 0.5–1.4)
CTP QC/QA: YES
DIFFERENTIAL METHOD BLD: ABNORMAL
EOSINOPHIL # BLD AUTO: 0.3 K/UL (ref 0–0.5)
EOSINOPHIL NFR BLD: 4.1 % (ref 0–8)
ERYTHROCYTE [DISTWIDTH] IN BLOOD BY AUTOMATED COUNT: 14.4 % (ref 11.5–14.5)
EST. GFR  (NO RACE VARIABLE): >60 ML/MIN/1.73 M^2
ESTIMATED AVG GLUCOSE: 134 MG/DL (ref 68–131)
FERRITIN SERPL-MCNC: 76 NG/ML (ref 20–300)
FOLATE SERPL-MCNC: 13.2 NG/ML (ref 4–24)
GLUCOSE SERPL-MCNC: 100 MG/DL (ref 70–110)
HBA1C MFR BLD: 6.3 % (ref 4–5.6)
HBV SURFACE AG SERPL QL IA: NORMAL
HCT VFR BLD AUTO: 42.6 % (ref 37–48.5)
HCV AB SERPL QL IA: NORMAL
HDLC SERPL-MCNC: 43 MG/DL (ref 40–75)
HDLC SERPL: 20.5 % (ref 20–50)
HGB BLD-MCNC: 13.5 G/DL (ref 12–16)
HIV 1+2 AB+HIV1 P24 AG SERPL QL IA: NORMAL
IMM GRANULOCYTES # BLD AUTO: 0.04 K/UL (ref 0–0.04)
IMM GRANULOCYTES NFR BLD AUTO: 0.5 % (ref 0–0.5)
IRON SERPL-MCNC: 31 UG/DL (ref 30–160)
LDLC SERPL CALC-MCNC: 135.4 MG/DL (ref 63–159)
LYMPHOCYTES # BLD AUTO: 3.6 K/UL (ref 1–4.8)
LYMPHOCYTES NFR BLD: 44.1 % (ref 18–48)
MCH RBC QN AUTO: 25.5 PG (ref 27–31)
MCHC RBC AUTO-ENTMCNC: 31.7 G/DL (ref 32–36)
MCV RBC AUTO: 80 FL (ref 82–98)
MONOCYTES # BLD AUTO: 0.7 K/UL (ref 0.3–1)
MONOCYTES NFR BLD: 8.8 % (ref 4–15)
NEUTROPHILS # BLD AUTO: 3.4 K/UL (ref 1.8–7.7)
NEUTROPHILS NFR BLD: 41.9 % (ref 38–73)
NONHDLC SERPL-MCNC: 167 MG/DL
NRBC BLD-RTO: 0 /100 WBC
PATH REV BLD -IMP: NORMAL
PLATELET # BLD AUTO: 317 K/UL (ref 150–450)
PLATELET BLD QL SMEAR: ABNORMAL
PMV BLD AUTO: 11 FL (ref 9.2–12.9)
POIKILOCYTOSIS BLD QL SMEAR: SLIGHT
POTASSIUM SERPL-SCNC: 4.1 MMOL/L (ref 3.5–5.1)
PROT SERPL-MCNC: 7.8 G/DL (ref 6–8.4)
RBC # BLD AUTO: 5.3 M/UL (ref 4–5.4)
RPR SER QL: NORMAL
SATURATED IRON: 8 % (ref 20–50)
SODIUM SERPL-SCNC: 138 MMOL/L (ref 136–145)
TOTAL IRON BINDING CAPACITY: 380 UG/DL (ref 250–450)
TRANSFERRIN SERPL-MCNC: 257 MG/DL (ref 200–375)
TRIGL SERPL-MCNC: 158 MG/DL (ref 30–150)
TSH SERPL DL<=0.005 MIU/L-ACNC: 1.87 UIU/ML (ref 0.4–4)
VIT B12 SERPL-MCNC: 470 PG/ML (ref 210–950)
WBC # BLD AUTO: 8.2 K/UL (ref 3.9–12.7)

## 2024-01-31 PROCEDURE — 99395 PREV VISIT EST AGE 18-39: CPT | Mod: S$GLB,,, | Performed by: STUDENT IN AN ORGANIZED HEALTH CARE EDUCATION/TRAINING PROGRAM

## 2024-01-31 PROCEDURE — 84443 ASSAY THYROID STIM HORMONE: CPT | Performed by: STUDENT IN AN ORGANIZED HEALTH CARE EDUCATION/TRAINING PROGRAM

## 2024-01-31 PROCEDURE — 82607 VITAMIN B-12: CPT | Performed by: STUDENT IN AN ORGANIZED HEALTH CARE EDUCATION/TRAINING PROGRAM

## 2024-01-31 PROCEDURE — 1159F MED LIST DOCD IN RCRD: CPT | Mod: CPTII,S$GLB,, | Performed by: STUDENT IN AN ORGANIZED HEALTH CARE EDUCATION/TRAINING PROGRAM

## 2024-01-31 PROCEDURE — 81025 URINE PREGNANCY TEST: CPT | Mod: S$GLB,,, | Performed by: STUDENT IN AN ORGANIZED HEALTH CARE EDUCATION/TRAINING PROGRAM

## 2024-01-31 PROCEDURE — 80061 LIPID PANEL: CPT | Performed by: STUDENT IN AN ORGANIZED HEALTH CARE EDUCATION/TRAINING PROGRAM

## 2024-01-31 PROCEDURE — 87340 HEPATITIS B SURFACE AG IA: CPT | Performed by: STUDENT IN AN ORGANIZED HEALTH CARE EDUCATION/TRAINING PROGRAM

## 2024-01-31 PROCEDURE — 83540 ASSAY OF IRON: CPT | Performed by: STUDENT IN AN ORGANIZED HEALTH CARE EDUCATION/TRAINING PROGRAM

## 2024-01-31 PROCEDURE — 86592 SYPHILIS TEST NON-TREP QUAL: CPT | Performed by: STUDENT IN AN ORGANIZED HEALTH CARE EDUCATION/TRAINING PROGRAM

## 2024-01-31 PROCEDURE — 36415 COLL VENOUS BLD VENIPUNCTURE: CPT | Performed by: STUDENT IN AN ORGANIZED HEALTH CARE EDUCATION/TRAINING PROGRAM

## 2024-01-31 PROCEDURE — 3044F HG A1C LEVEL LT 7.0%: CPT | Mod: CPTII,S$GLB,, | Performed by: STUDENT IN AN ORGANIZED HEALTH CARE EDUCATION/TRAINING PROGRAM

## 2024-01-31 PROCEDURE — 99213 OFFICE O/P EST LOW 20 MIN: CPT | Mod: 25,S$GLB,, | Performed by: STUDENT IN AN ORGANIZED HEALTH CARE EDUCATION/TRAINING PROGRAM

## 2024-01-31 PROCEDURE — 87389 HIV-1 AG W/HIV-1&-2 AB AG IA: CPT | Performed by: STUDENT IN AN ORGANIZED HEALTH CARE EDUCATION/TRAINING PROGRAM

## 2024-01-31 PROCEDURE — 82306 VITAMIN D 25 HYDROXY: CPT | Performed by: STUDENT IN AN ORGANIZED HEALTH CARE EDUCATION/TRAINING PROGRAM

## 2024-01-31 PROCEDURE — 3074F SYST BP LT 130 MM HG: CPT | Mod: CPTII,S$GLB,, | Performed by: STUDENT IN AN ORGANIZED HEALTH CARE EDUCATION/TRAINING PROGRAM

## 2024-01-31 PROCEDURE — 85025 COMPLETE CBC W/AUTO DIFF WBC: CPT | Performed by: STUDENT IN AN ORGANIZED HEALTH CARE EDUCATION/TRAINING PROGRAM

## 2024-01-31 PROCEDURE — 3080F DIAST BP >= 90 MM HG: CPT | Mod: CPTII,S$GLB,, | Performed by: STUDENT IN AN ORGANIZED HEALTH CARE EDUCATION/TRAINING PROGRAM

## 2024-01-31 PROCEDURE — 86803 HEPATITIS C AB TEST: CPT | Performed by: STUDENT IN AN ORGANIZED HEALTH CARE EDUCATION/TRAINING PROGRAM

## 2024-01-31 PROCEDURE — 82746 ASSAY OF FOLIC ACID SERUM: CPT | Performed by: STUDENT IN AN ORGANIZED HEALTH CARE EDUCATION/TRAINING PROGRAM

## 2024-01-31 PROCEDURE — 99999 PR PBB SHADOW E&M-EST. PATIENT-LVL III: CPT | Mod: PBBFAC,,, | Performed by: STUDENT IN AN ORGANIZED HEALTH CARE EDUCATION/TRAINING PROGRAM

## 2024-01-31 PROCEDURE — 83036 HEMOGLOBIN GLYCOSYLATED A1C: CPT | Performed by: STUDENT IN AN ORGANIZED HEALTH CARE EDUCATION/TRAINING PROGRAM

## 2024-01-31 PROCEDURE — 3008F BODY MASS INDEX DOCD: CPT | Mod: CPTII,S$GLB,, | Performed by: STUDENT IN AN ORGANIZED HEALTH CARE EDUCATION/TRAINING PROGRAM

## 2024-01-31 PROCEDURE — 80053 COMPREHEN METABOLIC PANEL: CPT | Performed by: STUDENT IN AN ORGANIZED HEALTH CARE EDUCATION/TRAINING PROGRAM

## 2024-01-31 PROCEDURE — 85060 BLOOD SMEAR INTERPRETATION: CPT | Mod: ,,, | Performed by: PATHOLOGY

## 2024-01-31 PROCEDURE — 82728 ASSAY OF FERRITIN: CPT | Performed by: STUDENT IN AN ORGANIZED HEALTH CARE EDUCATION/TRAINING PROGRAM

## 2024-01-31 RX ORDER — NORELGESTROMIN AND ETHINYL ESTRADIOL 35; 150 UG/MG; UG/MG
1 PATCH TRANSDERMAL WEEKLY
Qty: 12 PATCH | Refills: 3 | Status: SHIPPED | OUTPATIENT
Start: 2024-01-31 | End: 2024-03-21

## 2024-01-31 NOTE — PROGRESS NOTES
Subjective:       Patient ID: Shaina Mas is a 38 y.o. female.    Chief Complaint: Health maintenance examination [Z00.00]    Patient is established with me, here today for the following:    Health maintenance -   Denies family history of colorectal cancer.  Denies family history of breast cancer.  Denies family history of ovarian cancer.  Last pap performed JUNE2022.   Denies history of abnormal pap smear.  Denies family history of osteoporosis.  Denies significant family history of cardiac disease.  UTD on COVID primary/booster vaccinations.  Due for influenza, Tdap, COVID vaccinations.  Unsure HPV vaccination status.   Started smoking at age [age], rarely at first, at age 36 at most <0.5 PPD.   Down to 2-3 cigarettes per weekend.  Drinks alcohol 1 time monthly, 1 drinks per sitting.  Denies drug use.  Completed HIV and hepatitis C screening.  Due for lipid screening.  Lab Results       Component                Value               Date                       LDLCALC                  91.6                12/29/2022                 Has regular menstrual cycles monthly.  Last menstrual cycle lasted for 14 days.  Endorses menorrhagia for 3 days of cycle.  Began menarche at age [age].   Never previously pregnant.   Currently sexually active with male partner.  Ran out of patches, would like to restart for contraception.   Agreeable to routine STI testing.       Prediabetes -   Due for diabetes screening.  Lab Results       Component                Value               Date                       HGBA1C                   6.2 (H)             12/29/2022                  Has been having diarrhea for the past few months  Initially started after going to [employer] to eat at restaurant  Would occur when ate at that restaurant, but then became persistent when not eating at the restaurant   Has been worsening since that time  Will have BM immediately after eating, loose, watery  Has had only one well formed BM in the past  month     Endorses back pain is improving with PT twice weekly  Started with right sided chest wall pain about 1 week ago  Will notice associated with right shoulder pain  Tried stretching without significant improvement  Endorses sharp pain    Denies known personal history of thalassemia or sickle cell   Unsure family history of thalassemia or sickle cell   Lab Results       Component                Value               Date                       HGB                      13.2                12/29/2022                 HCT                      40.9                12/29/2022                 MCV                      81 (L)              12/29/2022                 RDW                      14.1                12/29/2022                  Review of Systems   Constitutional:  Negative for activity change, fatigue and fever.   Respiratory:  Negative for cough and shortness of breath.    Cardiovascular:  Negative for chest pain and palpitations.   Gastrointestinal:  Positive for diarrhea. Negative for abdominal pain, constipation, nausea and vomiting.   Neurological:  Negative for syncope and headaches.         Current Outpatient Medications   Medication Instructions    albuterol (PROVENTIL/VENTOLIN HFA) 90 mcg/actuation inhaler 2 puffs, Inhalation, Every 6 hours PRN, Rescue    ammonium lactate 12 % Crea Apply twice daily to affected parts both feet as needed.    azelastine (ASTELIN) 137 mcg, Nasal, 2 times daily    benzonatate (TESSALON) 100 mg, Oral, 3 times daily PRN    celecoxib (CELEBREX) 200 mg, Oral, Daily    fluticasone propionate (FLONASE) 100 mcg, Each Nostril, Daily PRN    ibuprofen (ADVIL,MOTRIN) 600 mg, Oral, Every 6 hours PRN, Take with meals.    montelukast (SINGULAIR) 10 mg tablet TAKE 1 TABLET BY MOUTH EVERY DAY IN THE EVENING    mv-min/iron/folic/calcium/vitK (WOMEN'S MULTIVITAMIN ORAL) Oral    norelgestromin-ethinyl estradiol 150-35 mcg/24 hr 1 patch, Transdermal, Weekly, CHANGE PATCH Q WEEK X 3 WEEKS THEN  ONE WEEK OFF WITH NO PATCH     Objective:      Vitals:    01/31/24 1439   BP: (!) 118/90   Pulse: 80   SpO2: 98%   Weight: 135.4 kg (298 lb 8.1 oz)     Body mass index is 51.24 kg/m².    Physical Exam  Vitals reviewed.   Constitutional:       General: She is not in acute distress.     Appearance: Normal appearance. She is not ill-appearing or diaphoretic.   HENT:      Head: Normocephalic and atraumatic.      Right Ear: Tympanic membrane, ear canal and external ear normal. There is no impacted cerumen.      Left Ear: Tympanic membrane, ear canal and external ear normal. There is no impacted cerumen.      Nose: Nose normal. No rhinorrhea.      Mouth/Throat:      Mouth: Mucous membranes are moist.      Pharynx: Oropharynx is clear. No oropharyngeal exudate or posterior oropharyngeal erythema.   Eyes:      General: No scleral icterus.        Right eye: No discharge.         Left eye: No discharge.      Conjunctiva/sclera: Conjunctivae normal.   Neck:      Thyroid: No thyromegaly or thyroid tenderness.      Trachea: Trachea normal.   Cardiovascular:      Rate and Rhythm: Normal rate and regular rhythm.      Heart sounds: Normal heart sounds. No murmur heard.     No friction rub. No gallop.   Pulmonary:      Effort: Pulmonary effort is normal. No respiratory distress.      Breath sounds: Normal breath sounds. No stridor. No wheezing, rhonchi or rales.   Abdominal:      General: There is no distension.      Palpations: Abdomen is soft.      Tenderness: There is no abdominal tenderness. There is no guarding or rebound.   Musculoskeletal:         General: No swelling or deformity.        Arms:       Cervical back: Neck supple.      Comments: No pain with chest press against resistance    Lymphadenopathy:      Head:      Right side of head: No submandibular or posterior auricular adenopathy.      Left side of head: No submandibular or posterior auricular adenopathy.      Cervical: No cervical adenopathy.      Right cervical:  No superficial, deep or posterior cervical adenopathy.     Left cervical: No superficial, deep or posterior cervical adenopathy.      Upper Body:      Right upper body: No supraclavicular adenopathy.      Left upper body: No supraclavicular adenopathy.   Skin:     General: Skin is warm and dry.   Neurological:      General: No focal deficit present.      Mental Status: She is alert. Mental status is at baseline.      Gait: Gait normal.   Psychiatric:         Mood and Affect: Mood normal.         Behavior: Behavior normal.         Assessment:       1. Health maintenance examination    2. Prediabetes    3. Chronic diarrhea    4. Right-sided chest wall pain    5. Anemia, unspecified type    6. Menorrhagia with regular cycle    7. Vitamin D deficiency    8. Encounter for other contraceptive management    9. Need for vaccination    10. Screening for cardiovascular condition    11. Screening for HIV without presence of risk factors    12. Encounter for hepatitis C screening test for low risk patient    13. Exposure to sexually transmitted disease (STD)        Plan:       Prediabetes  -     Hemoglobin A1C; Future    Chronic diarrhea  -     H. pylori antigen, stool; Future  -     Fecal fat, qualitative; Future  -     Stool Exam-Ova,Cysts,Parasites; Future  -     WBC, Stool; Future  -     Calprotectin, Stool; Future  -     Giardia / Cryptosporidum, EIA; Future    Right-sided chest wall pain  -     Ambulatory referral/consult to Sports Medicine; Future    Anemia, unspecified type  Menorrhagia with regular cycle  -     TSH; Future  -     CBC Auto Differential; Future  -     Pathologist Interpretation Differential; Future  -     Vitamin B12; Future  -     Folate; Future  -     Iron and TIBC; Future  -     Ferritin; Future    Vitamin D deficiency  -     Vitamin D; Future    Encounter for other contraceptive management  UPT negative   Continue current medications.  RTC in 6 months for follow up.  -     POCT Urine Pregnancy  -      RPR; Future  -     Hepatitis B Surface Antigen; Future  -     C. trachomatis/N. gonorrhoeae by AMP DNA [facility]; Urine; Future  -     norelgestromin-ethinyl estradiol 150-35 mcg/24 hr; Place 1 patch onto the skin once a week. CHANGE PATCH Q WEEK X 3 WEEKS THEN ONE WEEK OFF WITH NO PATCH    Health maintenance examination  Reviewed and discussed age appropriate screenings and immunizations.  -     Comprehensive Metabolic Panel; Future  -     TSH; Future  -     Lipid Panel; Future  -     Hemoglobin A1C; Future  -     CBC Auto Differential; Future  -     Vitamin D; Future  -     Pathologist Interpretation Differential; Future  -     Vitamin B12; Future  -     Folate; Future  -     Iron and TIBC; Future  -     Ferritin; Future  -     HIV 1/2 Ag/Ab (4th Gen); Future  -     RPR; Future  -     Hepatitis B Surface Antigen; Future  -     Hepatitis C Antibody; Future  -     C. trachomatis/N. gonorrhoeae by AMP DNA [facility]; Urine; Future    Need for vaccination  Discussed recommended vaccinations and how to obtain.    Screening for cardiovascular condition  -     Lipid Panel; Future    Screening for HIV without presence of risk factors  -     HIV 1/2 Ag/Ab (4th Gen); Future    Encounter for hepatitis C screening test for low risk patient  -     Hepatitis C Antibody; Future    Exposure to sexually transmitted disease (STD)  -     RPR; Future  -     Hepatitis B Surface Antigen; Future  -     C. trachomatis/N. gonorrhoeae by AMP DNA [facility]; Urine; Future      Bernadette Reich MD  1/31/2024

## 2024-02-01 ENCOUNTER — LAB VISIT (OUTPATIENT)
Dept: LAB | Facility: OTHER | Age: 39
End: 2024-02-01
Attending: STUDENT IN AN ORGANIZED HEALTH CARE EDUCATION/TRAINING PROGRAM
Payer: COMMERCIAL

## 2024-02-01 DIAGNOSIS — K52.9 CHRONIC DIARRHEA: ICD-10-CM

## 2024-02-01 PROCEDURE — 83993 ASSAY FOR CALPROTECTIN FECAL: CPT | Performed by: STUDENT IN AN ORGANIZED HEALTH CARE EDUCATION/TRAINING PROGRAM

## 2024-02-01 PROCEDURE — 87338 HPYLORI STOOL AG IA: CPT | Performed by: STUDENT IN AN ORGANIZED HEALTH CARE EDUCATION/TRAINING PROGRAM

## 2024-02-01 PROCEDURE — 87209 SMEAR COMPLEX STAIN: CPT | Performed by: STUDENT IN AN ORGANIZED HEALTH CARE EDUCATION/TRAINING PROGRAM

## 2024-02-01 PROCEDURE — 87329 GIARDIA AG IA: CPT | Performed by: STUDENT IN AN ORGANIZED HEALTH CARE EDUCATION/TRAINING PROGRAM

## 2024-02-01 PROCEDURE — 82705 FATS/LIPIDS FECES QUAL: CPT | Performed by: STUDENT IN AN ORGANIZED HEALTH CARE EDUCATION/TRAINING PROGRAM

## 2024-02-01 PROCEDURE — 89055 LEUKOCYTE ASSESSMENT FECAL: CPT | Performed by: STUDENT IN AN ORGANIZED HEALTH CARE EDUCATION/TRAINING PROGRAM

## 2024-02-02 LAB
CRYPTOSP AG STL QL IA: NEGATIVE
G LAMBLIA AG STL QL IA: NEGATIVE
PATH REV BLD -IMP: NORMAL
WBC #/AREA STL HPF: NORMAL /[HPF]

## 2024-02-04 LAB
FAT STL QL: NORMAL
NEUTRAL FAT STL QL: NORMAL

## 2024-02-05 LAB
CALPROTECTIN STL-MCNT: 11.4 MCG/G
O+P STL MICRO: NORMAL

## 2024-02-09 LAB — H PYLORI AG STL QL IA: DETECTED

## 2024-03-01 ENCOUNTER — PATIENT MESSAGE (OUTPATIENT)
Dept: INTERNAL MEDICINE | Facility: CLINIC | Age: 39
End: 2024-03-01
Payer: COMMERCIAL

## 2024-03-01 DIAGNOSIS — A04.8 H. PYLORI INFECTION: Primary | ICD-10-CM

## 2024-03-01 RX ORDER — TETRACYCLINE HYDROCHLORIDE 500 MG/1
500 CAPSULE ORAL 4 TIMES DAILY
Qty: 56 CAPSULE | Refills: 0 | Status: SHIPPED | OUTPATIENT
Start: 2024-03-01 | End: 2024-03-15

## 2024-03-01 RX ORDER — METRONIDAZOLE 250 MG/1
250 TABLET ORAL 4 TIMES DAILY
Qty: 56 TABLET | Refills: 0 | Status: SHIPPED | OUTPATIENT
Start: 2024-03-01 | End: 2024-03-15

## 2024-03-01 RX ORDER — PANTOPRAZOLE SODIUM 40 MG/1
40 TABLET, DELAYED RELEASE ORAL 2 TIMES DAILY
Qty: 28 TABLET | Refills: 0 | Status: SHIPPED | OUTPATIENT
Start: 2024-03-01 | End: 2024-03-15

## 2024-03-14 DIAGNOSIS — J06.9 UPPER RESPIRATORY TRACT INFECTION, UNSPECIFIED TYPE: ICD-10-CM

## 2024-03-15 RX ORDER — BENZONATATE 100 MG/1
100 CAPSULE ORAL 3 TIMES DAILY PRN
Qty: 30 CAPSULE | Refills: 0 | Status: SHIPPED | OUTPATIENT
Start: 2024-03-15

## 2024-03-21 ENCOUNTER — OFFICE VISIT (OUTPATIENT)
Dept: OBSTETRICS AND GYNECOLOGY | Facility: CLINIC | Age: 39
End: 2024-03-21
Payer: COMMERCIAL

## 2024-03-21 VITALS
DIASTOLIC BLOOD PRESSURE: 83 MMHG | WEIGHT: 292.13 LBS | BODY MASS INDEX: 50.14 KG/M2 | HEART RATE: 84 BPM | SYSTOLIC BLOOD PRESSURE: 108 MMHG

## 2024-03-21 DIAGNOSIS — Z12.4 SCREENING FOR CERVICAL CANCER: ICD-10-CM

## 2024-03-21 DIAGNOSIS — Z30.45 ENCOUNTER FOR SURVEILLANCE OF TRANSDERMAL PATCH HORMONAL CONTRACEPTIVE DEVICE: ICD-10-CM

## 2024-03-21 DIAGNOSIS — Z01.419 ROUTINE GYNECOLOGICAL EXAMINATION: Primary | ICD-10-CM

## 2024-03-21 PROCEDURE — 3074F SYST BP LT 130 MM HG: CPT | Mod: CPTII,S$GLB,, | Performed by: NURSE PRACTITIONER

## 2024-03-21 PROCEDURE — 99999 PR PBB SHADOW E&M-EST. PATIENT-LVL III: CPT | Mod: PBBFAC,,, | Performed by: NURSE PRACTITIONER

## 2024-03-21 PROCEDURE — 87624 HPV HI-RISK TYP POOLED RSLT: CPT | Performed by: NURSE PRACTITIONER

## 2024-03-21 PROCEDURE — 3008F BODY MASS INDEX DOCD: CPT | Mod: CPTII,S$GLB,, | Performed by: NURSE PRACTITIONER

## 2024-03-21 PROCEDURE — 1159F MED LIST DOCD IN RCRD: CPT | Mod: CPTII,S$GLB,, | Performed by: NURSE PRACTITIONER

## 2024-03-21 PROCEDURE — 88175 CYTOPATH C/V AUTO FLUID REDO: CPT | Performed by: NURSE PRACTITIONER

## 2024-03-21 PROCEDURE — 3079F DIAST BP 80-89 MM HG: CPT | Mod: CPTII,S$GLB,, | Performed by: NURSE PRACTITIONER

## 2024-03-21 PROCEDURE — 3044F HG A1C LEVEL LT 7.0%: CPT | Mod: CPTII,S$GLB,, | Performed by: NURSE PRACTITIONER

## 2024-03-21 PROCEDURE — 99395 PREV VISIT EST AGE 18-39: CPT | Mod: S$GLB,,, | Performed by: NURSE PRACTITIONER

## 2024-03-21 PROCEDURE — 1160F RVW MEDS BY RX/DR IN RCRD: CPT | Mod: CPTII,S$GLB,, | Performed by: NURSE PRACTITIONER

## 2024-03-21 RX ORDER — NORELGESTROMIN AND ETHINYL ESTRADIOL 150; 35 UG/D; UG/D
1 PATCH TRANSDERMAL WEEKLY
Qty: 12 PATCH | Refills: 3 | Status: SHIPPED | OUTPATIENT
Start: 2024-03-21 | End: 2025-03-21

## 2024-03-21 NOTE — PROGRESS NOTES
Chief Complaint: Well Woman Exam     HPI:      Shaina is a 38 y.o.  who presents today for well woman exam.      Denies any breast, vaginal, urinary complaints or pelvic pain today.    Patient's last menstrual period was 2024. Denies AUB w/ patches. She does endorse a history of irregular periods prior to being on birth control. She was seeing a provider that was questioning PCOS.    Shaina is currently sexually active with a single male partner. She is currently using contraceptive patches for contraception. She was recently switched from Xulane brand to Zafemy brand and she feels like this brand does not adhere as well to her skin.    She is not a cigarette smoker.   She does not have a h/o migraine with aura or VTE herself.    Previous Pap: NILM (3/21/2024)    Gardasil:Pt Unsure     She denies family history of breast, GYN, colon, or  cancers.    .     OB History          0    Para   0    Term   0       0    AB   0    Living   0         SAB   0    IAB   0    Ectopic   0    Multiple   0    Live Births   0               ROS:     GENERAL: Feeling well overall.   CARDIOVASCULAR: Denies palpitations or chest pain.   RESPIRATORY: Denies shortness of breath.  BREASTS: see HPI.  ABDOMEN: Denies constipation, diarrhea, blood in stool.  URINARY: see HPI.  REPRODUCTIVE: see HPI.  PSYCHIATRIC: Denies uncontrolled depression or anxiety.    Physical Exam:      Physical Exam     /83   Pulse 84   Wt 132.5 kg (292 lb 1.8 oz)   LMP 2024   BMI 50.14 kg/m²   Body mass index is 50.14 kg/m².     APPEARANCE: Well nourished, well developed, in no acute distress.  PSYCH: Appropriate mood and affect.  SKIN: No acne or hirsutism.  CARDIOVASCULAR: Regular rate and rhythm. No edema of peripheral extremities.  PULMONARY: Effort and breath sounds normal.  NECK: Neck symmetric without masses. No thyromegaly.  NODES: No axillary lymph node enlargement.  BREASTS: Symmetrical, no visible skin lesions.  Lumpy bumpy feel to bilateral breasts. No nipple discharge bilaterally.  PELVIC: Normal external genitalia without lesions.  Normal hair distribution.  Adequate perineal body, normal urethral meatus.  Vagina moist and well rugated. Without lesions. Vagina without abnormal discharge.  Cervix without lesions, abnormal discharge, or tenderness.. No significant cystocele or rectocele.  Bimanual exam deferred.    Assessment/Plan:     Routine gynecological examination  -     HPV High Risk Genotypes, PCR  -     Liquid-Based Pap Smear, Screening  -     XULANE 150-35 mcg/24 hr; Place 1 patch onto the skin once a week.  Dispense: 12 patch; Refill: 3    Screening for cervical cancer  -     HPV High Risk Genotypes, PCR  -     Liquid-Based Pap Smear, Screening    Encounter for surveillance of transdermal patch hormonal contraceptive device  -     XULANE 150-35 mcg/24 hr; Place 1 patch onto the skin once a week.  Dispense: 12 patch; Refill: 3    PLAN:    Pap test collected  Contraception - Xulane patches refilled - requested brand name. Today's discussion included:   The need for regular compliance to ensure adequate contraceptive effect.  Potential though less likely major side effects such as MI, stroke, and deep vein thrombosis. She has been asked to report any signs of such serious problems immediately.      Follow up in about 1 year (around 3/21/2025).    Counseling:     Patient was counseled today on the recommendation for yearly wellness exams, importance of breast self awareness and annual mammograms, as well as the current ASCCP pap guidelines. She was counseled on importance of regular exercise. She is to see her PCP for other health maintenance.     Use of the [facility] Patient Portal discussed and encouraged during today's visit.

## 2024-03-26 LAB
HPV HR 12 DNA SPEC QL NAA+PROBE: NEGATIVE
HPV16 AG SPEC QL: NEGATIVE
HPV18 DNA SPEC QL NAA+PROBE: NEGATIVE

## 2024-03-28 LAB
FINAL PATHOLOGIC DIAGNOSIS: NORMAL
Lab: NORMAL

## 2024-05-12 ENCOUNTER — PATIENT MESSAGE (OUTPATIENT)
Dept: INTERNAL MEDICINE | Facility: CLINIC | Age: 39
End: 2024-05-12
Payer: COMMERCIAL

## 2024-06-11 ENCOUNTER — OFFICE VISIT (OUTPATIENT)
Dept: INTERNAL MEDICINE | Facility: CLINIC | Age: 39
End: 2024-06-11
Payer: COMMERCIAL

## 2024-06-11 DIAGNOSIS — R73.03 PREDIABETES: ICD-10-CM

## 2024-06-11 DIAGNOSIS — Z86.19 HISTORY OF HELICOBACTER PYLORI INFECTION: Primary | ICD-10-CM

## 2024-06-11 DIAGNOSIS — Z71.3 ENCOUNTER FOR WEIGHT LOSS COUNSELING: ICD-10-CM

## 2024-06-11 DIAGNOSIS — E88.810 METABOLIC SYNDROME: ICD-10-CM

## 2024-06-11 DIAGNOSIS — E66.01 CLASS 3 SEVERE OBESITY DUE TO EXCESS CALORIES WITH SERIOUS COMORBIDITY AND BODY MASS INDEX (BMI) OF 50.0 TO 59.9 IN ADULT: ICD-10-CM

## 2024-06-11 PROBLEM — E66.813 CLASS 3 SEVERE OBESITY DUE TO EXCESS CALORIES WITH SERIOUS COMORBIDITY AND BODY MASS INDEX (BMI) OF 50.0 TO 59.9 IN ADULT: Status: ACTIVE | Noted: 2024-06-11

## 2024-06-11 PROCEDURE — 99213 OFFICE O/P EST LOW 20 MIN: CPT | Mod: 95,,, | Performed by: STUDENT IN AN ORGANIZED HEALTH CARE EDUCATION/TRAINING PROGRAM

## 2024-06-11 PROCEDURE — 3044F HG A1C LEVEL LT 7.0%: CPT | Mod: CPTII,95,, | Performed by: STUDENT IN AN ORGANIZED HEALTH CARE EDUCATION/TRAINING PROGRAM

## 2024-06-11 RX ORDER — SEMAGLUTIDE 0.25 MG/.5ML
0.25 INJECTION, SOLUTION SUBCUTANEOUS
Qty: 2 ML | Refills: 0 | Status: SHIPPED | OUTPATIENT
Start: 2024-06-11

## 2024-06-11 NOTE — PROGRESS NOTES
"The patient's location is:  [place]  The chief complaint leading to consultation is:  History of Helicobacter pylori infection [Z86.19]    Visit type: audiovisual    Time spent in discussion with the patient: 22 minutes  27 minutes of total time spent on the encounter. This includes time spent in discussion with the patient and time preparing for the patient appointment: review of tests, obtaining and/or reviewing separately obtained history, documenting clinical information in the electronic or other health record, independently interpreting results (not separately reported), and communicating results to the patient/family/caregiver or care coordination (not separately reported).     Each patient to whom he or she provides medical services by telemedicine is: (1) informed of the relationship between the physician and patient and the respective role of any other health care provider with respect to management of the patient; and (2) notified that he or she may decline to receive medical services by telemedicine and may withdraw from such care at any time.      Subjective:   Shaina Mas is a 38 y.o. female who presents for History of Helicobacter pylori infection [Z86.19].       Patient is established with me, here today for the following:    Needs repeat H pylori testing to ensure clearance    Weight management -    Dietary:   BMR: 1,999 kcal/day  Goal calories per day: 1600 - 1800 kcal/day  Per prior discussion:  "2 meals per day  Breakfast: usually skips. Occasionally yogurt, [place] thin bread and cheese. Weekends, eggs with avocado and bread.  Lunch: Eggs, salad, dinner left overs, sandwich.   Dinner: Meat, chicken or beef. Tomato sauce with meat. Pasta or rice sometimes. Vegetables. Steak, mashed potatoes, and broccoli.   If eats out will eat mediterranean food. Hummus, rice, meat, salad. Mexican food with meat.  Doesn't eat fried food.   Not routinely snacking. Occasional yogurt or tangerine.   Drinking " "mostly water. Sweet tea frequently. Coffee plain in the morning."    Exercise:  Has been swimming twice weekly  Target HR: 157 bpm     Never previously has tried weight reduction medication.  Denies personal history of pancreatitis   Liliam family history of thyroid cancer or MEN syndrome  No prior weight reduction surgeries     Wt Readings from Last 10 Encounters:  03/21/24 : 132.5 kg (292 lb 1.8 oz)  01/31/24 : 135.4 kg (298 lb 8.1 oz)  11/21/23 : 134 kg (295 lb 6.7 oz)  11/20/23 : 134 kg (295 lb 6.7 oz)  11/02/23 : 134.2 kg (295 lb 13.7 oz)  10/20/23 : 134.2 kg (295 lb 13.7 oz)  10/16/23 : 131 kg (288 lb 12.8 oz)  10/12/23 : 134.4 kg (296 lb 4.8 oz)  08/16/23 : 130 kg (286 lb 9.6 oz)  08/15/23 : 130.6 kg (288 lb)    Lab Results       Component                Value               Date                       HGBA1C                   6.3 (H)             01/31/2024                 HGBA1C                   6.2 (H)             12/29/2022            Lab Results       Component                Value               Date                       HDL                      43                  01/31/2024            Lab Results       Component                Value               Date                       TRIG                     158 (H)             01/31/2024              Metabolic syndrome:   Waist circumference   Triglycerides >150 mg/dL  HDL cholesterol <50 mg/dL  Prediabetes          Review of Systems   Constitutional:  Negative for activity change and unexpected weight change.   HENT:  Negative for hearing loss, rhinorrhea and trouble swallowing.    Eyes:  Negative for discharge and visual disturbance.   Respiratory:  Negative for chest tightness and wheezing.    Cardiovascular:  Negative for chest pain and palpitations.   Gastrointestinal:  Negative for blood in stool, constipation, diarrhea and vomiting.   Endocrine: Negative for polydipsia and polyuria.   Genitourinary:  Negative for difficulty urinating, dysuria, hematuria " and menstrual problem.   Musculoskeletal:  Negative for arthralgias and neck pain.   Neurological:  Negative for weakness and headaches.   Psychiatric/Behavioral:  Negative for confusion and dysphoric mood.          Current Outpatient Medications   Medication Instructions    albuterol (PROVENTIL/VENTOLIN HFA) 90 mcg/actuation inhaler 2 puffs, Inhalation, Every 6 hours PRN, Rescue    ammonium lactate 12 % Crea Apply twice daily to affected parts both feet as needed.    azelastine (ASTELIN) 137 mcg, Nasal, 2 times daily    benzonatate (TESSALON) 100 mg, Oral, 3 times daily PRN    celecoxib (CELEBREX) 200 mg, Oral, Daily    fluticasone propionate (FLONASE) 100 mcg, Each Nostril, Daily PRN    hepatitis B vacc-CpG 1018, PF, (HEPLISAV-B, PF,) 20 mcg/0.5 mL Syrg Intramuscular    ibuprofen (ADVIL,MOTRIN) 600 mg, Oral, Every 6 hours PRN, Take with meals.    measles, mumps and rubella vaccine (MMR) 1,000-12,500 TCID50/0.5 mL injection Subcutaneous    montelukast (SINGULAIR) 10 mg tablet TAKE 1 TABLET BY MOUTH EVERY DAY IN THE EVENING    mv-min/iron/folic/calcium/vitK (WOMEN'S MULTIVITAMIN ORAL) Oral    pantoprazole (PROTONIX) 40 mg, Oral, 2 times daily    WEGOVY 0.25 mg, Subcutaneous, Every 7 days    XULANE 150-35 mcg/24 hr 1 patch, Transdermal, Weekly       Objective:   There were no vitals filed for this visit.     Physical Exam  Constitutional:       General: She is not in acute distress.     Appearance: Normal appearance. She is not ill-appearing or diaphoretic.   Neurological:      Mental Status: She is alert.   Psychiatric:         Attention and Perception: Attention normal.         Mood and Affect: Mood and affect normal.         Speech: Speech normal.           Assessment/Plan:       History of Helicobacter pylori infection  Test of cure   -     H. pylori antigen, stool; Future    Encounter for weight loss counseling  Class 3 severe obesity due to excess calories with serious comorbidity and body mass index (BMI) of  50.0 to 59.9 in adult  Metabolic syndrome  Prediabetes  Start Wegovy 0.25 mg weekly.  If tolerating medication without side effects, contact office after 3rd injection and can increase to Wegovy 0.5 mg weekly.   Recommend the following:      Goal of at least 150 minutes aerobic exercise weekly. Recommend 30 mins, 5 days weekly.      Target heart rate while performing aerobic activity 157 bpm.       Monitoring caloric intake for 1-2 days weekly, with a goal of 1600 - 1800 kcal/day.       Organizing plate with half vegetables, quarter whole grain starches or starchy vegetables, and quarter lean protein.       Goal of at least 1 vegetarian meal weekly and emphasizing fresh vegetables and fruits in diet.       Weight loss goal of 1-2 lbs per week. Goal by next appt in 12 weeks is 15 lbs.        Increasing healthy based fats such as avocados, olive oil, nuts, and freshwater, cold-water fish.      Decreasing red meat to no more than once weekly.       RTC in 3 months for weight management follow up.   -     semaglutide, weight loss, (WEGOVY) 0.25 mg/0.5 mL PnIj; Inject 0.25 mg into the skin every 7 days.        Bernadette Reich MD  06/11/2024

## 2024-07-19 ENCOUNTER — CLINICAL SUPPORT (OUTPATIENT)
Dept: DIABETES | Facility: CLINIC | Age: 39
End: 2024-07-19
Payer: COMMERCIAL

## 2024-07-19 DIAGNOSIS — R73.03 PREDIABETES: Primary | ICD-10-CM

## 2024-07-19 PROCEDURE — 99999 PR PBB SHADOW E&M-EST. PATIENT-LVL I: CPT | Mod: PBBFAC,,, | Performed by: DIETITIAN, REGISTERED

## 2024-07-19 PROCEDURE — G0108 DIAB MANAGE TRN  PER INDIV: HCPCS | Mod: S$GLB,,, | Performed by: DIETITIAN, REGISTERED

## 2024-07-22 ENCOUNTER — PATIENT MESSAGE (OUTPATIENT)
Dept: PODIATRY | Facility: CLINIC | Age: 39
End: 2024-07-22
Payer: COMMERCIAL

## 2024-07-23 VITALS — WEIGHT: 293 LBS | BODY MASS INDEX: 50.02 KG/M2 | HEIGHT: 64 IN

## 2024-07-23 NOTE — PROGRESS NOTES
"Diabetes Care Specialist Progress Note  Author: Bambi Hall RD, CDE  Date: 7/23/2024    Intake    Program Intake  Reason for Diabetes Program Visit:: Intervention  Type of Intervention:: Individual  Individual: Education  Education: Self-Management Skill Review  Current diabetes risk level:: low  In the last 12 months, have you:: none    Current Diabetes Treatment: Diet/Exercise  Diet/Exercise Type/Dose: carb controlled diet    Continuous Glucose Monitoring  Patient has CGM: No    Lab Results   Component Value Date    HGBA1C 6.3 (H) 01/31/2024       Weight: 133.1 kg (293 lb 6.9 oz)   Height: 5' 4" (162.6 cm)   Body mass index is 50.37 kg/m².      Today's interventions were provided through individual discussion, instruction, and written materials were provided.      Patient verbalized understanding of instruction and written materials.  Pt was able to return back demonstration of instructions today. Patient understood key points, needs reinforcement and further instruction.     Diabetes Self-Management Care Plan:    Today's Diabetes Self-Management Care Plan was developed with Shaina's input. Shaina has agreed to work toward the following goal(s) to improve his/her overall diabetes control.      Care Plan: Diabetes Management   Updates made since 6/23/2024 12:00 AM        Problem: Healthy Eating         Goal: Will add a serving of non-starchy vegetables to meals and limit snack to 15-20g CHO.    Start Date: 10/19/2023   Expected End Date: 1/18/2024   Priority: High   Barriers: No Barriers Identified   Note:    10/19/23 - Reviewed sources of carbohydrate, choosing more complex/high fiber carbs vs simple carbs, portion sizes using dry measuring cups and food models for visual aid, label reading, and balancing meals with lean protein and non-starchy vegetables. Reviewed meal planning, plate method, and went over some examples. Pt verbalizes realizes her portions are larger and does not eat that much non-starchy " vegetables. Plans to add more non-starchy vegetables and will eat protein and vegetables first and carbs last at meals. Also eats some high carb snacks often - encouraged checking labels and limiting meals to 30-45gCHO and snacks to 15-20g.     11/20/23 - Eating a lot more non-starchy vegetables but finding she is having some loose BMs. Discussed try logging meal or two she has prior to episodes of diarrhea and see if a pattern is noticeable. If d/t very large amounts of non-starchy vegetables, can cut down on portion and balance with protein and carb. Also, can try increasing probiotic-rich foods like having a low- sugar yogurt daily for snack.     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 She has continued to focus on more non-starchy veg and proteins. Eating pattern has been very different over the summer - eats 2 meals daily (brunch and dinner) and sometimes a snack if out with friends but not often. 24hr recall: B- black coffee, 2 eggs, 1/2 avocado, 2 slices whole wheat bread, 2 spoons olives, D- 1/2 steak, salad with tomato, cucumber, onion, fresh mini mozzarella balls and olive oil. Drinking mostly water and sometimes hot tea - has cut out adding sugar to tea. Avoiding sweets for the most part unless at friends house (will have ice cream with friend). She is disappointed with not losing any wt. Discussed overall her new eating habits are very healthful but a few areas where she can cut down on calories: measuring and controlling portion size of fats (olive oil, avocado, nuts, cheeses) and assessing how often she is having snacks or sweet treats with friends. Encouraged using MyFitnessPal to keep a food log for 2-3 days and self-assess where she may be getting extra calories. Also discussed some healthier sweet treat options. Going back to school will add on some stressors, which have been a trigger for more snacking. Discussed stocking pantry and fridge with  some healthy options.        Problem: Physical Activity and Exercise         Goal: Pt will start with adding at least 5 minutes of enjoyable movement daily.    Start Date: 4/11/2023   Expected End Date: 5/13/2023   This Visit's Progress: Met   Recent Progress: On track   Priority: Medium   Barriers: No Barriers Identified   Note:    4/11/23 - Pt is active at work but no other activities in the evening or weekends. Discussed benefits of regular physical activity on reducing insulin resistance. Discussed some ideas to increase PA.     10/19/23 - Has not been able to add in activity yet. Work schedule is a barrier. Discussed some ideas for starting new habit. Pt's mom is coming for a visit, and she plans start going to workout at the gym with her.     11/20/23 - Mom didn't get to come visit after all. Has not yet started exercise. Purchased a treadmill. Plans to start walking on treadmill or go swimming at least 3x/week. Has a friend who will go swimming with her. Discussed setting small goal to get started, keep threshold to start low, and building up as she can. Pt is interested in finding out exactly % lean mass compared to body fat. Explained would require metabolic testing - may be able to have done with RD at [facility]. Provided contact information.    7/19/24 - Pt has exceeded her activity goal and is going to the gym 3x/week, working with a  and spends about 45 min on a mix of cardio and strength training. She is also walking or going to [place] with friends at least 2 more days out of the week. Praised excellent change! She recognizes will be more difficult to keep up once school year starts but plans to at least continue with the gym 3x/week and walk on the weekends. Helps that her  sends her text messages motivating her to show up. She feels good when she exercises and is noticing a difference in her endurance and energy level. 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          Follow Up Plan     Follow up in about 3 months (around 10/19/2024) for holiday break follow-up.    Today's care plan and follow up schedule was discussed with patient.  Shaina verbalized understanding of the care plan, goals, and agrees to follow up plan.        The patient was encouraged to communicate with his/her health care provider/physician and care team regarding his/her condition(s) and treatment.  I provided the patient with my contact information today and encouraged to contact me via phone or [facility] as needed.     Length of Visit   Total Time: 60 Minutes

## 2024-07-24 ENCOUNTER — OFFICE VISIT (OUTPATIENT)
Dept: PODIATRY | Facility: CLINIC | Age: 39
End: 2024-07-24
Payer: COMMERCIAL

## 2024-07-24 VITALS
WEIGHT: 293 LBS | DIASTOLIC BLOOD PRESSURE: 80 MMHG | BODY MASS INDEX: 50.02 KG/M2 | HEIGHT: 64 IN | SYSTOLIC BLOOD PRESSURE: 100 MMHG | HEART RATE: 114 BPM

## 2024-07-24 DIAGNOSIS — L60.9 DISEASE OF NAIL: Primary | ICD-10-CM

## 2024-07-24 PROCEDURE — 3008F BODY MASS INDEX DOCD: CPT | Mod: CPTII,S$GLB,, | Performed by: PODIATRIST

## 2024-07-24 PROCEDURE — 3074F SYST BP LT 130 MM HG: CPT | Mod: CPTII,S$GLB,, | Performed by: PODIATRIST

## 2024-07-24 PROCEDURE — 1159F MED LIST DOCD IN RCRD: CPT | Mod: CPTII,S$GLB,, | Performed by: PODIATRIST

## 2024-07-24 PROCEDURE — 3079F DIAST BP 80-89 MM HG: CPT | Mod: CPTII,S$GLB,, | Performed by: PODIATRIST

## 2024-07-24 PROCEDURE — 3044F HG A1C LEVEL LT 7.0%: CPT | Mod: CPTII,S$GLB,, | Performed by: PODIATRIST

## 2024-07-24 PROCEDURE — 99213 OFFICE O/P EST LOW 20 MIN: CPT | Mod: S$GLB,,, | Performed by: PODIATRIST

## 2024-07-24 PROCEDURE — 87101 SKIN FUNGI CULTURE: CPT | Performed by: PODIATRIST

## 2024-07-24 PROCEDURE — 99999 PR PBB SHADOW E&M-EST. PATIENT-LVL III: CPT | Mod: PBBFAC,,, | Performed by: PODIATRIST

## 2024-07-24 NOTE — PROGRESS NOTES
Subjective:      Patient ID: Shaina Mas is a 38 y.o. female.    Chief Complaint: Nail Problem (Nail discoloration / thickening)    Shaina is a 38 y.o. female who presents to the clinic complaining of thick and discolored toenails on both feet. Shaina is inquiring about treatment options.    Review of Systems   Constitutional: Negative for chills, fever and malaise/fatigue.   HENT:  Negative for hearing loss.    Cardiovascular:  Negative for claudication.   Respiratory:  Negative for shortness of breath.    Skin:  Positive for nail changes. Negative for flushing and rash.   Musculoskeletal:  Negative for joint pain and myalgias.   Neurological:  Negative for loss of balance, numbness, paresthesias and sensory change.   Psychiatric/Behavioral:  Negative for altered mental status.          Objective:      Physical Exam  Vitals reviewed.   Cardiovascular:      Pulses:           Dorsalis pedis pulses are 2+ on the right side and 2+ on the left side.        Posterior tibial pulses are 2+ on the right side and 2+ on the left side.      Comments: No edema noted b/L  Musculoskeletal:      Comments:        Feet:      Right foot:      Protective Sensation: 5 sites tested.  5 sites sensed.      Toenail Condition: Right toenails are abnormally thick.      Left foot:      Protective Sensation: 5 sites tested.  5 sites sensed.      Toenail Condition: Left toenails are abnormally thick.   Skin:     Comments: Normal skin tugor noted.   No open lesion noted b/L  Skin temp is warm to warm from proximal to distal b/L.  Webspaces clean, dry, and intact     Neurological:      Mental Status: She is alert.      Comments: Gross sensation intact b/L           Assessment:       Encounter Diagnosis   Name Primary?    Disease of nail Yes         Plan:       Shaina was seen today for nail problem.    Diagnoses and all orders for this visit:    Disease of nail  -     CULTURE, FUNGUS - SKIN, HAIR, OR NAILS      I counseled the patient on her  conditions, their implications and medical management.    Pt was seen today for changes in toenail. Pt was advised that nail changes could be due to toenail fungus or damage/trauma to toenail or toenail matrix.   Pt advised that there are currently no prescription options for nail damage/trauma.   Fungal culture taken of toenail? yes  Treatment options discussed with pt for toenails positive for toenail fungus including oral lamisil (that requires a hepatic function test) and topical treatments such as penlac and DMSO compound formula. Pt opted for unsure, will let me know    Treatment and differences explained in detail      Pt advised she will be notified of culture results.       .

## 2024-07-30 ENCOUNTER — TELEPHONE (OUTPATIENT)
Dept: INTERNAL MEDICINE | Facility: CLINIC | Age: 39
End: 2024-07-30
Payer: COMMERCIAL

## 2024-07-30 NOTE — TELEPHONE ENCOUNTER
Container for Stool H Pylori still at desk. Patient states she was out of town and will pick it up this week.

## 2024-08-29 ENCOUNTER — OFFICE VISIT (OUTPATIENT)
Dept: OPTOMETRY | Facility: CLINIC | Age: 39
End: 2024-08-29
Payer: COMMERCIAL

## 2024-08-29 DIAGNOSIS — H52.533 ACCOMMODATIVE SPASM, BILATERAL: Primary | ICD-10-CM

## 2024-08-29 DIAGNOSIS — H52.13 MYOPIA OF BOTH EYES WITH ASTIGMATISM: ICD-10-CM

## 2024-08-29 DIAGNOSIS — H52.203 MYOPIA OF BOTH EYES WITH ASTIGMATISM: ICD-10-CM

## 2024-08-29 PROCEDURE — 92004 COMPRE OPH EXAM NEW PT 1/>: CPT | Mod: S$GLB,,, | Performed by: OPTOMETRIST

## 2024-08-29 PROCEDURE — 92015 DETERMINE REFRACTIVE STATE: CPT | Mod: S$GLB,,, | Performed by: OPTOMETRIST

## 2024-08-29 PROCEDURE — 3044F HG A1C LEVEL LT 7.0%: CPT | Mod: CPTII,S$GLB,, | Performed by: OPTOMETRIST

## 2024-08-29 PROCEDURE — 1159F MED LIST DOCD IN RCRD: CPT | Mod: CPTII,S$GLB,, | Performed by: OPTOMETRIST

## 2024-08-29 PROCEDURE — 99999 PR PBB SHADOW E&M-EST. PATIENT-LVL III: CPT | Mod: PBBFAC,,, | Performed by: OPTOMETRIST

## 2024-08-29 NOTE — PROGRESS NOTES
HPI    Annual Exam   Pt states vision is blurry   Pt reports last specs did not help   Pt reports trouble with accomodation from near to distance     Pt denies F/F     Pt denies Dry/ Itchy/ Burning  Gtt: No    Last edited by Jazmyn Lance on 8/29/2024  3:07 PM.            Assessment /Plan     For exam results, see Encounter Report.    Accommodative spasm, bilateral  -reviewed PALs    Myopia of both eyes with astigmatism  Eyeglass Final Rx       Eyeglass Final Rx         Sphere Cylinder Axis Dist VA Add    Right -0.50 +0.50 180 20/20 +1.00    Left -0.50 +0.50 180 20/20 +1.00      Type: PAL    Expiration Date: 8/29/2025                      RTC 1 yr

## 2024-09-15 ENCOUNTER — OFFICE VISIT (OUTPATIENT)
Dept: URGENT CARE | Facility: CLINIC | Age: 39
End: 2024-09-15
Payer: COMMERCIAL

## 2024-09-15 VITALS
WEIGHT: 293 LBS | HEIGHT: 64 IN | SYSTOLIC BLOOD PRESSURE: 126 MMHG | DIASTOLIC BLOOD PRESSURE: 85 MMHG | RESPIRATION RATE: 19 BRPM | TEMPERATURE: 99 F | HEART RATE: 106 BPM | BODY MASS INDEX: 50.02 KG/M2 | OXYGEN SATURATION: 98 %

## 2024-09-15 DIAGNOSIS — H93.8X3 CONGESTION OF BOTH EARS: Primary | ICD-10-CM

## 2024-09-15 DIAGNOSIS — R09.81 SINUS CONGESTION: ICD-10-CM

## 2024-09-15 PROCEDURE — 99213 OFFICE O/P EST LOW 20 MIN: CPT | Mod: S$GLB,,,

## 2024-09-15 RX ORDER — VITAMIN A 3000 MCG
1 CAPSULE ORAL DAILY
Qty: 60 ML | Refills: 0 | Status: SHIPPED | OUTPATIENT
Start: 2024-09-15

## 2024-09-15 RX ORDER — PREDNISONE 20 MG/1
40 TABLET ORAL DAILY
Qty: 8 TABLET | Refills: 0 | Status: SHIPPED | OUTPATIENT
Start: 2024-09-15 | End: 2024-09-19

## 2024-09-15 NOTE — LETTER
September 15, 2024      [facility]  [address] [facility] [address]  Phone: [phone]  Fax: [phone]       Patient: Shaina Mas   YOB: 1985  Date of Visit: 09/15/2024    To Whom It May Concern:    Nevaeh Mas  was at [facility] on 09/15/2024. The patient may return to work/school on 9/17/2024 or 9/18/2024 with no restrictions. If you have any questions or concerns, or if I can be of further assistance, please do not hesitate to contact me.    Sincerely,    Shakira Suarez, NP

## 2024-09-15 NOTE — PROGRESS NOTES
"Subjective:      Patient ID: Shaina Mas is a 38 y.o. female.    Vitals:  height is 5' 4" (1.626 m) and weight is 133.1 kg (293 lb 6.9 oz). Her oral temperature is 98.7 °F (37.1 °C). Her blood pressure is 126/85 and her pulse is 106. Her respiration is 19 and oxygen saturation is 98%.     Chief Complaint: Sinus Problem    Pt is here for recurrent sinus problem that onset a week ago. Pt was seen at another urgent care and was diagnosed with pneumonia but hasn't felt better (2 days ago). She states symptoms of  cough, congestion, sore throat and headache. Using flonase, albuterol inhaler, doxycycline, promethazine/DM. Ear pain and headaches have worsened. No CP or SOB. +night sweats    Sinus Problem  This is a recurrent problem. The current episode started 1 to 4 weeks ago. The problem has been gradually worsening since onset. There has been no fever. Her pain is at a severity of 10/10. The pain is moderate. Associated symptoms include chills, congestion, coughing, ear pain, headaches, sinus pressure and a sore throat. Pertinent negatives include no shortness of breath. (Body temp) Past treatments include antibiotics. The treatment provided no relief.       Constitution: Positive for chills and fatigue. Negative for fever.   HENT:  Positive for ear pain, congestion, postnasal drip, sinus pressure and sore throat. Negative for ear discharge.    Cardiovascular:  Negative for chest pain.   Respiratory:  Positive for cough and wheezing. Negative for shortness of breath.    Gastrointestinal:  Negative for abdominal pain, nausea, vomiting and diarrhea.   Musculoskeletal:  Negative for muscle ache.   Neurological:  Positive for headaches.      Objective:     Physical Exam   Constitutional: She is oriented to person, place, and time. She appears well-developed. She is cooperative.  Non-toxic appearance. She does not appear ill. No distress.   HENT:   Head: Normocephalic and atraumatic.   Ears:   Right Ear: Hearing, " external ear and ear canal normal. A middle ear effusion is present.   Left Ear: Hearing, external ear and ear canal normal. A middle ear effusion is present.   Nose: No mucosal edema, rhinorrhea, nasal deformity or congestion. No epistaxis. Right sinus exhibits frontal sinus tenderness. Right sinus exhibits no maxillary sinus tenderness. Left sinus exhibits frontal sinus tenderness. Left sinus exhibits no maxillary sinus tenderness.   Mouth/Throat: Uvula is midline, oropharynx is clear and moist and mucous membranes are normal. No trismus in the jaw. Normal dentition. No uvula swelling. No oropharyngeal exudate, posterior oropharyngeal edema or posterior oropharyngeal erythema.   Eyes: Conjunctivae and lids are normal. No scleral icterus.   Neck: Trachea normal and phonation normal. Neck supple. No edema present. No erythema present. No neck rigidity present.   Cardiovascular: Normal rate, regular rhythm, normal heart sounds and normal pulses.   Pulmonary/Chest: Effort normal and breath sounds normal. No stridor. No respiratory distress. She has no decreased breath sounds. She has no wheezes. She has no rhonchi. She has no rales.   Abdominal: Normal appearance.   Musculoskeletal: Normal range of motion.         General: No deformity. Normal range of motion.   Lymphadenopathy:        Head (right side): Preauricular and posterior auricular adenopathy present.        Head (left side): Preauricular and posterior auricular adenopathy present.     She has cervical adenopathy.   Neurological: She is alert and oriented to person, place, and time. She exhibits normal muscle tone. Coordination normal.   Skin: Skin is warm, dry, intact, not diaphoretic and not pale.   Psychiatric: Her speech is normal and behavior is normal. Judgment and thought content normal.   Nursing note and vitals reviewed.      Assessment:     1. Congestion of both ears    2. Sinus congestion        Plan:       Congestion of both ears  -     predniSONE  (DELTASONE) 20 MG tablet; Take 2 tablets (40 mg total) by mouth once daily. for 4 days  Dispense: 8 tablet; Refill: 0  -     sodium chloride (SALINE NASAL) 0.65 % nasal spray; 1 spray by Nasal route once daily.  Dispense: 60 mL; Refill: 0    Sinus congestion  -     predniSONE (DELTASONE) 20 MG tablet; Take 2 tablets (40 mg total) by mouth once daily. for 4 days  Dispense: 8 tablet; Refill: 0  -     sodium chloride (SALINE NASAL) 0.65 % nasal spray; 1 spray by Nasal route once daily.  Dispense: 60 mL; Refill: 0            Discussed results/diagnosis/plan with patient in clinic. Strict precautions given to patient to monitor for worsening signs and symptoms. Advised to follow up with PCP or specialist.  Explained side effects of medications prescribed with patient and informed him/her to discontinue use if he/she has any side effects and to inform UC or PCP if this occurs. All questions answered. Strict ED verses clinic return precautions stressed and given in depth. Advised if symptoms worsens of fail to improve he/she should go to the Emergency Room. Discharge and follow-up instructions given verbally/printed with the patient who expressed understanding and willingness to comply with my recommendations. Patient voiced understanding and in agreement with current treatment plan. Patient exits the exam room in no acute distress. Conversant and engaged during discharge discussion, verbalized understanding.

## 2024-09-15 NOTE — PATIENT INSTRUCTIONS
Take prednisone (steroid) as directed x 4 days. Take in the morning so it does not keep you up at night. Take with food and water. This medication can increase your BP and sugar level. Please monitor and eat a well balanced diet. Avoid high salt content foods and eat more foods with potassium, magnesium and calcium.     Continue doxycycline antibiotic and take as prescribed. Take full dose or symptoms will not get better. Take with food and water to decrease GI upset. Try a probiotic supplement or eat daily yogurt to maintain good gut and vaginal roldan while on an antibiotic. Do not drink alcohol while taking an antibiotic.       Continue albuterol inhaler for chest tightness/shortness of breath/wheezing/coughing fits as directed.     Try a decongestant and corticosteroid nasal spray like flonase for the next few days for sinus relief. Initial: 2 sprays in each nostril once daily for 1 week. Reduce to 1 spray in each nostril once per day. Stop taking if you develop a nose bleed. Nasal saline spray can be used together with flonase to help moisten nostrils. An antihistamine like zyrtec, claritin, allegra can also be helpful for sinus relief and will help dry nasal passages.     Honey is a natural cough suppressant.     -If you do NOT have high blood pressure, you may use a decongestant form (D)  of this medication (ie. Claritin- D, zyrtec-D, allegra-D, Mucinex-D) or if you do not take the D form, you can take sudafed (pseudoephedrine) over the counter, which is a powerful decongestant. Do NOT take two decongestant (D) medications at the same time (such as mucinex-D and claritin-D or plain sudafed and claritin D). Dextromethorphan (DM) is a cough suppressant over the counter (ie. mucinex DM, robitussin, delsym; dayquil/nyquil has DM as well.) Try Afrin for nasal congestion at bedtime. Only use for 3 days as this can cause a rebound of congestion. Try cepacol for sore throat.     Warm tea/warm liquids will help soothe  the back of your throat. Warm water salt gurgles can also be helpful. A dry throat will cause pain. Make sure to stay hydrated. Water and pedilyte are the best to drink. Neti pot irrigation, humidifier in your room, avoiding fans, warm compresses to face, eating/drinking hot soups, hot shower before bedtime can help.     The recommended daily fluid intake for women is 2.7 liters (five 16 oz bottles).      Alternate Tylenol and ibuprofen every 4 hours as needed for fever and body aches.  Please take NSAIDs with a full glass of water and food to avoid GI upset.      Please only use over the counter cough and cold medications for 3-5 days at a time to avoid rebound symptoms.     Getting plenty of rest can aid in a faster recovery of illnesses.     Please follow-up with your primary care provider or return to the clinic if not better/worsening symptoms in 1 week.     Report to the ER if you have chest pain, shortness of breath, palpitations.

## 2025-04-08 ENCOUNTER — PATIENT MESSAGE (OUTPATIENT)
Dept: INTERNAL MEDICINE | Facility: CLINIC | Age: 40
End: 2025-04-08

## 2025-04-08 ENCOUNTER — OFFICE VISIT (OUTPATIENT)
Dept: INTERNAL MEDICINE | Facility: CLINIC | Age: 40
End: 2025-04-08
Payer: COMMERCIAL

## 2025-04-08 VITALS
BODY MASS INDEX: 45.99 KG/M2 | DIASTOLIC BLOOD PRESSURE: 84 MMHG | HEART RATE: 115 BPM | WEIGHT: 286.19 LBS | HEIGHT: 66 IN | SYSTOLIC BLOOD PRESSURE: 120 MMHG | OXYGEN SATURATION: 98 %

## 2025-04-08 DIAGNOSIS — J02.0 STREP PHARYNGITIS: ICD-10-CM

## 2025-04-08 DIAGNOSIS — J02.0 STREP PHARYNGITIS: Primary | ICD-10-CM

## 2025-04-08 DIAGNOSIS — A04.8 H. PYLORI INFECTION: ICD-10-CM

## 2025-04-08 DIAGNOSIS — J02.9 PHARYNGITIS, UNSPECIFIED ETIOLOGY: Primary | ICD-10-CM

## 2025-04-08 LAB
CTP QC/QA: YES
S PYO RRNA THROAT QL PROBE: POSITIVE

## 2025-04-08 PROCEDURE — 3008F BODY MASS INDEX DOCD: CPT | Mod: CPTII,S$GLB,,

## 2025-04-08 PROCEDURE — 99999 PR PBB SHADOW E&M-EST. PATIENT-LVL III: CPT | Mod: PBBFAC,,,

## 2025-04-08 PROCEDURE — 3074F SYST BP LT 130 MM HG: CPT | Mod: CPTII,S$GLB,,

## 2025-04-08 PROCEDURE — 1159F MED LIST DOCD IN RCRD: CPT | Mod: CPTII,S$GLB,,

## 2025-04-08 PROCEDURE — 99213 OFFICE O/P EST LOW 20 MIN: CPT | Mod: S$GLB,,,

## 2025-04-08 PROCEDURE — 3079F DIAST BP 80-89 MM HG: CPT | Mod: CPTII,S$GLB,,

## 2025-04-08 RX ORDER — PANTOPRAZOLE SODIUM 40 MG/1
40 TABLET, DELAYED RELEASE ORAL 2 TIMES DAILY
Qty: 28 TABLET | Refills: 0 | Status: SHIPPED | OUTPATIENT
Start: 2025-04-08 | End: 2025-04-22

## 2025-04-08 RX ORDER — LEVOCETIRIZINE DIHYDROCHLORIDE 5 MG/1
5 TABLET, FILM COATED ORAL NIGHTLY
COMMUNITY

## 2025-04-08 RX ORDER — AMOXICILLIN AND CLAVULANATE POTASSIUM 875; 125 MG/1; MG/1
1 TABLET, FILM COATED ORAL EVERY 12 HOURS
Qty: 14 TABLET | Refills: 0 | Status: SHIPPED | OUTPATIENT
Start: 2025-04-08

## 2025-04-08 NOTE — LETTER
April 8, 2025      [facility]  [address]  [facility] [address]  Phone: [phone]  Fax: [phone]       Patient: Shaina Mas   YOB: 1985  Date of Visit: 04/08/2025    To Whom It May Concern:    Nevaeh Mas  was at [facility] on 04/08/2025. The patient may return to work/school on 4/10/2025 with no restrictions. If you have any questions or concerns, or if I can be of further assistance, please do not hesitate to contact me.    Sincerely,    Nora Briscoe LPN

## 2025-04-09 DIAGNOSIS — J06.9 UPPER RESPIRATORY TRACT INFECTION, UNSPECIFIED TYPE: ICD-10-CM

## 2025-04-09 NOTE — TELEPHONE ENCOUNTER
Refill Encounter    PCP Visits: Recent Visits  Date Type Provider Dept   04/08/25 Office Visit Riley Russo NP [facility]   06/11/24 Office Visit Bernadette Reich MD [facility]   Showing recent visits within past 360 days and meeting all other requirements  Future Appointments  Date Type Provider Dept   04/16/25 Appointment Bernadette Reich MD [facility]   05/01/25 Appointment Bernadette Reich MD [facility]   Showing future appointments within next 720 days and meeting all other requirements      Last 3 Blood Pressure:   BP Readings from Last 3 Encounters:   04/08/25 120/84   09/15/24 126/85   07/24/24 100/80     Preferred Pharmacy:   [id number] [place] [address] AT [facility] [address]  [facility] [address]  [place] [address]  Phone: [phone] Fax: [phone]    Requested RX:  Requested Prescriptions     Pending Prescriptions Disp Refills    benzonatate (TESSALON) 100 MG capsule 30 capsule 0     Sig: Take 1 capsule (100 mg total) by mouth 3 (three) times daily as needed for Cough.      RX Route: Normal

## 2025-04-11 NOTE — PROGRESS NOTES
"    CHIEF COMPLAINT     Chief Complaint   Patient presents with    Fever    Sore Throat       HPI     Shaina Mas is a 39 y.o. female who presents for xxx today.    PCP is Bernadette Reich MD, patient is new to me. This note was generated with the assistance of ambient listening technology. Verbal consent was obtained by the patient and accompanying visitor(s) for the recording of patient appointment to facilitate this note. I attest to having reviewed and edited the generated note for accuracy, though some syntax or spelling errors may persist. Please contact the author of this note for any clarification.     History of Present Illness    CHIEF COMPLAINT:  Patient presents today for persistent sore throat and associated symptoms.    HISTORY OF PRESENT ILLNESS:  She was evaluated at urgent care on Sunday where testing was negative for flu, COVID, and strep. She was diagnosed with a throat infection and prescribed amoxicillin and an allergy medication. Despite starting treatment Sunday, symptoms have persisted. She reports bilateral throat pain with palpable "bumps" and swollen lymph nodes in the neck region. She experiences neck pain, headache, and ongoing chills requiring multiple blankets for warmth. She notes audible breathing even with mouth closed, which she localizes to the throat area. She spent today in bed due to symptoms.    CURRENT MEDICATIONS:  She is currently taking amoxicillin and an allergy medication since Sunday.      ROS:  General: -fever, +chills, -fatigue, -weight gain, -weight loss  Eyes: -vision changes, -redness, -discharge  ENT: -ear pain, -nasal congestion, +sore throat  Cardiovascular: -chest pain, -palpitations, -lower extremity edema  Respiratory: -cough, -shortness of breath  Gastrointestinal: -abdominal pain, -nausea, -vomiting, -diarrhea, -constipation, -blood in stool  Genitourinary: -dysuria, -hematuria, -frequency  Musculoskeletal: -joint pain, -muscle pain  Skin: -rash, " -lesion  Neurological: +headache, -dizziness, -numbness, -tingling  Psychiatric: -anxiety, -depression, -sleep difficulty  Lymphatics: +swollen lymph nodes  Neck: +neck pain  Head: +head pain          Past Medical History:  Past Medical History:   Diagnosis Date    Prediabetes     Smoker        Home Medications:  Prior to Admission medications    Medication Sig Start Date End Date Taking? Authorizing Provider   albuterol (PROVENTIL/VENTOLIN HFA) 90 mcg/actuation inhaler Inhale 2 puffs into the lungs every 6 (six) hours as needed for Wheezing or Shortness of Breath. Rescue 5/11/23  Yes Bernadette Reich MD   ammonium lactate 12 % Crea Apply twice daily to affected parts both feet as needed. 7/19/22  Yes Cyrus Moses DPM   azelastine (ASTELIN) 137 mcg (0.1 %) nasal spray 1 spray (137 mcg total) by Nasal route 2 (two) times daily. 6/13/23 1/17/27 Yes Nigel Martinez MD   benzonatate (TESSALON) 100 MG capsule Take 1 capsule (100 mg total) by mouth 3 (three) times daily as needed for Cough. 3/15/24  Yes Chata Chavez MD   celecoxib (CELEBREX) 200 MG capsule Take 1 capsule (200 mg total) by mouth once daily. 11/21/23  Yes Miquel Montgomery,    fluticasone propionate (FLONASE) 50 mcg/actuation nasal spray 2 sprays (100 mcg total) by Each Nostril route daily as needed. 11/2/23  Yes Liana Ford NP   ibuprofen (ADVIL,MOTRIN) 600 MG tablet Take 1 tablet (600 mg total) by mouth every 6 (six) hours as needed for Pain. Take with meals. 10/16/23  Yes Mio Castaneda PA-C   levocetirizine (XYZAL) 5 MG tablet Take 5 mg by mouth every evening.   Yes Provider, Historical   mv-min/iron/folic/calcium/vitK (WOMEN'S MULTIVITAMIN ORAL) Take by mouth.   Yes Provider, Historical   sodium chloride (SALINE NASAL) 0.65 % nasal spray 1 spray by Nasal route once daily. 9/15/24  Yes Shakira Suarez NP   XULANE 150-35 mcg/24 hr Place 1 patch onto the skin once a week. 3/21/24 4/8/25 Yes Cathie Keita, NP  "  amoxicillin-clavulanate 875-125mg (AUGMENTIN) 875-125 mg per tablet Take 1 tablet by mouth every 12 (twelve) hours. 4/8/25   Riley Russo, NP   montelukast (SINGULAIR) 10 mg tablet TAKE 1 TABLET BY MOUTH EVERY DAY IN THE EVENING  Patient not taking: Reported on 4/8/2025 1/20/23   April Louis PA-C   pantoprazole (PROTONIX) 40 MG tablet Take 1 tablet (40 mg total) by mouth 2 (two) times daily. for 14 days 4/8/25 4/22/25  Riley Russo, NP       Review of Systems:  Review of Systems   Constitutional: Negative.    HENT:  Positive for sore throat and trouble swallowing.    Respiratory: Negative.     Cardiovascular: Negative.    Hematological:  Positive for adenopathy.       Health Maintainence:   Immunizations:  Health Maintenance         Date Due Completion Date    Influenza Vaccine (1) 09/01/2024 3/18/2024    COVID-19 Vaccine (6 - 2024-25 season) 09/01/2024 1/14/2022    Hemoglobin A1c (Prediabetes) 01/31/2025 1/31/2024    Cervical Cancer Screening 03/21/2029 3/21/2024    TETANUS VACCINE 03/18/2034 3/18/2024    RSV Vaccine (Age 60+ and Pregnant patients) (1 - 1-dose 75+ series) 10/23/2062 ---             PHYSICAL EXAM     /84   Pulse (!) 115   Ht 5' 6" (1.676 m)   Wt 129.8 kg (286 lb 2.5 oz)   LMP 03/12/2025   SpO2 98%   BMI 46.19 kg/m²     Physical Exam  Vitals reviewed.   Constitutional:       Appearance: She is well-developed.   HENT:      Head: Normocephalic and atraumatic.      Mouth/Throat:      Pharynx: Posterior oropharyngeal erythema present.      Tonsils: Tonsillar exudate present. 3+ on the right. 3+ on the left.   Eyes:      Conjunctiva/sclera: Conjunctivae normal.   Cardiovascular:      Rate and Rhythm: Normal rate.   Pulmonary:      Effort: Pulmonary effort is normal. No respiratory distress.   Lymphadenopathy:      Cervical: Cervical adenopathy present.      Left cervical: Superficial cervical adenopathy present.   Skin:     General: Skin is warm and dry.      " Findings: No rash.   Neurological:      Mental Status: She is alert and oriented to person, place, and time.      Coordination: Coordination normal.   Psychiatric:         Behavior: Behavior normal.           ASSESSMENT/PLAN   Assessment & Plan    UNSPECIFIED CHRONIC BRONCHITIS:  - Patient presents with audible breathing difficulties, throat pain, neck pain, and head pain.  - Reports feeling cold and having difficulty breathing.  - Examination revealed white pus on tonsils, suggesting a bacterial infection despite negative strep test.  - Patient also reports feeling bumps in the throat area and observing swelling.    ACUTE TONSILLITIS:  - Evaluated patient with persistent sore throat, despite negative flu, COVID, and strep tests at urgent care.  - Currently on amoxicillin prescribed at urgent care for throat infection.    FOLLOW-UP AND CONSULTATION:  - Plan to consult with colleague to confirm diagnosis and determine appropriate treatment plan.          Shaina was seen today for fever and sore throat.    Diagnoses and all orders for this visit:    Pharyngitis, unspecified etiology  -     amoxicillin-clavulanate 875-125mg (AUGMENTIN) 875-125 mg per tablet; Take 1 tablet by mouth every 12 (twelve) hours.  -     POCT rapid strep A    H. pylori infection  -     pantoprazole (PROTONIX) 40 MG tablet; Take 1 tablet (40 mg total) by mouth 2 (two) times daily. for 14 days    Strep pharyngitis  -     amoxicillin-clavulanate 875-125mg (AUGMENTIN) 875-125 mg per tablet; Take 1 tablet by mouth every 12 (twelve) hours.  -     POCT rapid strep A          Riley Russo NP   Department of Internal Medicine - [facility]  9:05 AM

## 2025-04-14 RX ORDER — AMOXICILLIN AND CLAVULANATE POTASSIUM 875; 125 MG/1; MG/1
1 TABLET, FILM COATED ORAL EVERY 12 HOURS
Qty: 6 TABLET | Refills: 0 | Status: SHIPPED | OUTPATIENT
Start: 2025-04-14 | End: 2025-04-22

## 2025-04-14 RX ORDER — BENZONATATE 100 MG/1
100 CAPSULE ORAL 3 TIMES DAILY PRN
Qty: 30 CAPSULE | Refills: 0 | Status: SHIPPED | OUTPATIENT
Start: 2025-04-14

## 2025-04-21 ENCOUNTER — OFFICE VISIT (OUTPATIENT)
Dept: OBSTETRICS AND GYNECOLOGY | Facility: CLINIC | Age: 40
End: 2025-04-21
Payer: COMMERCIAL

## 2025-04-21 ENCOUNTER — HOSPITAL ENCOUNTER (OUTPATIENT)
Dept: RADIOLOGY | Facility: OTHER | Age: 40
Discharge: HOME | End: 2025-04-21
Attending: NURSE PRACTITIONER
Payer: COMMERCIAL

## 2025-04-21 VITALS
WEIGHT: 290.81 LBS | HEIGHT: 66 IN | SYSTOLIC BLOOD PRESSURE: 124 MMHG | DIASTOLIC BLOOD PRESSURE: 82 MMHG | BODY MASS INDEX: 46.74 KG/M2

## 2025-04-21 DIAGNOSIS — Z01.419 ROUTINE GYNECOLOGICAL EXAMINATION: Primary | ICD-10-CM

## 2025-04-21 DIAGNOSIS — N93.9 ABNORMAL UTERINE BLEEDING (AUB): ICD-10-CM

## 2025-04-21 DIAGNOSIS — Z31.69 ENCOUNTER FOR PRECONCEPTION CONSULTATION: ICD-10-CM

## 2025-04-21 DIAGNOSIS — N76.0 ACUTE VAGINITIS: ICD-10-CM

## 2025-04-21 DIAGNOSIS — N94.89 LABIAL SWELLING: ICD-10-CM

## 2025-04-21 LAB
B-HCG UR QL: NEGATIVE
CTP QC/QA: YES

## 2025-04-21 PROCEDURE — 99999 PR PBB SHADOW E&M-EST. PATIENT-LVL IV: CPT | Mod: PBBFAC,,, | Performed by: NURSE PRACTITIONER

## 2025-04-21 PROCEDURE — 81515 NFCT DS BV&VAGINITIS DNA ALG: CPT | Performed by: NURSE PRACTITIONER

## 2025-04-21 PROCEDURE — 1160F RVW MEDS BY RX/DR IN RCRD: CPT | Mod: CPTII,S$GLB,, | Performed by: NURSE PRACTITIONER

## 2025-04-21 PROCEDURE — 3079F DIAST BP 80-89 MM HG: CPT | Mod: CPTII,S$GLB,, | Performed by: NURSE PRACTITIONER

## 2025-04-21 PROCEDURE — 76856 US EXAM PELVIC COMPLETE: CPT | Mod: 26,,, | Performed by: RADIOLOGY

## 2025-04-21 PROCEDURE — 76856 US EXAM PELVIC COMPLETE: CPT | Mod: TC

## 2025-04-21 PROCEDURE — 99214 OFFICE O/P EST MOD 30 MIN: CPT | Mod: 25,S$GLB,, | Performed by: NURSE PRACTITIONER

## 2025-04-21 PROCEDURE — 76830 TRANSVAGINAL US NON-OB: CPT | Mod: 26,,, | Performed by: RADIOLOGY

## 2025-04-21 PROCEDURE — 3074F SYST BP LT 130 MM HG: CPT | Mod: CPTII,S$GLB,, | Performed by: NURSE PRACTITIONER

## 2025-04-21 PROCEDURE — 3008F BODY MASS INDEX DOCD: CPT | Mod: CPTII,S$GLB,, | Performed by: NURSE PRACTITIONER

## 2025-04-21 PROCEDURE — 99395 PREV VISIT EST AGE 18-39: CPT | Mod: S$GLB,,, | Performed by: NURSE PRACTITIONER

## 2025-04-21 PROCEDURE — 1159F MED LIST DOCD IN RCRD: CPT | Mod: CPTII,S$GLB,, | Performed by: NURSE PRACTITIONER

## 2025-04-21 PROCEDURE — 81025 URINE PREGNANCY TEST: CPT | Mod: S$GLB,,, | Performed by: NURSE PRACTITIONER

## 2025-04-21 RX ORDER — MEDROXYPROGESTERONE ACETATE 10 MG/1
20 TABLET ORAL 3 TIMES DAILY
Qty: 42 TABLET | Refills: 0 | Status: SHIPPED | OUTPATIENT
Start: 2025-04-21 | End: 2025-04-28

## 2025-04-21 RX ORDER — CLOTRIMAZOLE AND BETAMETHASONE DIPROPIONATE 10; .64 MG/G; MG/G
CREAM TOPICAL 2 TIMES DAILY
Qty: 45 G | Refills: 0 | Status: SHIPPED | OUTPATIENT
Start: 2025-04-21 | End: 2025-05-05

## 2025-04-21 RX ORDER — FLUCONAZOLE 150 MG/1
150 TABLET ORAL ONCE
Qty: 1 TABLET | Refills: 0 | Status: SHIPPED | OUTPATIENT
Start: 2025-04-21 | End: 2025-04-22

## 2025-04-21 NOTE — PROGRESS NOTES
"Chief Complaint: Well Woman Exam     HPI:      Shaina is a 39 y.o.  who presents today for well woman exam. She has the following concerns today - 1) AUB. She stopped patches in 2025. Periods regular after stopping patches.   Periods monthly, last 5-6 days with light to heavy flow.   LMP: . Bleeding almost daily since. Flow is light to moderate.   Prior to this period was beginning of March.   2) Labial swelling ongoing since Thursday or Friday - painful. She took antibiotics prior to noticing the swelling. She denies any odor. She is c/o itching, "pinching", pain with sitting, "burning". She changed menstrual products and has noticed that the swelling has gone down.    Denies any breast, urinary complaints or pelvic pain today.    Menses are irregular. Patient's last menstrual period was 2025.     Shaina is currently sexually active with a single male partner. She is currently using no method for contraception - interested in pregnancy. She is not currently taking a prenatal vitamin.     Previous Pap: NILM, HPV negative (2024)    Gardasil:Pt Unsure     She denies family history of breast, GYN, colon, or  cancers.      She does participate in regular exercise.  She denies tobacco use. Former.     PCP: Bernadette Reich MD     Past Medical History:   Diagnosis Date    Prediabetes     Smoker      Current Medications[1]   Review of patient's allergies indicates:  No Known Allergies  Past Surgical History:   Procedure Laterality Date    NO PAST SURGERIES       Social History[2]  Family History   Problem Relation Name Age of Onset    Hypertension Mother      Diabetes Father      Hyperlipidemia Father      Hypertension Father      Allergies Brother      Osteoarthritis Brother      Allergies Brother      Anemia Maternal Grandmother      Cancer Maternal Grandfather      Diabetes Paternal Grandmother      Early death Paternal Grandfather      Colon cancer Neg Hx      Breast cancer Neg Hx      Ovarian " "cancer Neg Hx      Heart attack Neg Hx      Stroke Neg Hx      Osteoporosis Neg Hx         ROS:     GENERAL: Feeling well overall.   CARDIOVASCULAR: Denies palpitations or chest pain.   RESPIRATORY: Denies shortness of breath.  BREASTS: see HPI.  ABDOMEN: Denies constipation, diarrhea, blood in stool.  URINARY: see HPI.  REPRODUCTIVE: see HPI.  PSYCHIATRIC: Denies uncontrolled depression or anxiety.    Physical Exam:     /82   Ht 5' 6" (1.676 m)   Wt 131.9 kg (290 lb 12.6 oz)   LMP 04/08/2025   BMI 46.93 kg/m²   Body mass index is 46.93 kg/m².     APPEARANCE: Well nourished, well developed, in no acute distress.  PSYCH: Appropriate mood and affect.  SKIN: No acne or hirsutism.  CARDIOVASCULAR: Regular rate and rhythm. No edema of peripheral extremities.  PULMONARY: Effort and breath sounds normal.  NECK: Neck symmetric without masses. No thyromegaly.  NODES: No axillary lymph node enlargement.  BREASTS: Symmetrical, no visible skin lesions. No palpable masses. No nipple discharge bilaterally.  PELVIC: External genitalia without lesions - erythematous. Normal hair distribution.  Adequate perineal body, normal urethral meatus.  Vagina moist and well rugated. Without lesions. Vagina with abnormal discharge. Minimal amount of menses noted at cervical os.  Cervix without lesions, abnormal discharge, or tenderness.. No significant cystocele or rectocele.  Bimanual exam limited by habitus.      Verbal consent obtained    Chaperoned by: YVONNE Muse MA     Assessment/Plan:     Routine gynecological examination    Encounter for preconception consultation    Abnormal uterine bleeding (AUB)  -     POCT Urine Pregnancy  -     T4, Free; Future; Expected date: 04/21/2025  -     TSH; Future; Expected date: 04/21/2025  -     CBC Auto Differential; Future; Expected date: 04/21/2025  -     US Pelvis Comp with Transvag NON-OB (xpd; Future; Expected date: 04/21/2025  -     medroxyPROGESTERone (PROVERA) 10 MG tablet; Take 2 " tablets (20 mg total) by mouth 3 (three) times daily. Every 1-2 mths if no menses for 7 days  Dispense: 42 tablet; Refill: 0    Acute vaginitis  -     fluconazole (DIFLUCAN) 150 MG Tab; Take 1 tablet (150 mg total) by mouth once. for 1 dose  Dispense: 1 tablet; Refill: 0  -     clotrimazole-betamethasone 1-0.05% (LOTRISONE) cream; Apply topically 2 (two) times daily. for 14 days  Dispense: 45 g; Refill: 0  -     Vaginosis Screen by DNA Probe    Labial swelling  -     fluconazole (DIFLUCAN) 150 MG Tab; Take 1 tablet (150 mg total) by mouth once. for 1 dose  Dispense: 1 tablet; Refill: 0  -     clotrimazole-betamethasone 1-0.05% (LOTRISONE) cream; Apply topically 2 (two) times daily. for 14 days  Dispense: 45 g; Refill: 0  -     Vaginosis Screen by DNA Probe        PLAN:    Pap test up to date - due 3/2029   GC/CT  not indicated ; serum STI screening  not indicated/discussed  Gardasil  not discussed/indicated  Contraception - no method, interested in pregnancy; discussed importance of folic acid supplementation  AUB  Prescription sent for Provera 20 mg to take 3x/day for 7 days to help regulate AUB  Pelvic ultrasound, CBC, TFTs ordered for further evaluation of AUB  Labial swelling/vaginitis  Suspect VVC versus contact dermatitis  Affirm collected  Prescription sent for Fluconazole 150 mg to take in a single dose as well as a topical antifungal/steroid cream to use on affected area twice a day for 1-2 weeks    Face to Face time with patient: 35    40 minutes of total time spent on the encounter, which includes face to face time and non-face to face time preparing to see the patient (eg, review of tests), Obtaining and/or reviewing separately obtained history, Documenting clinical information in the electronic or other health record, Independently interpreting results (not separately reported) and communicating results to the patient/family/caregiver, or Care coordination (not separately reported).       Follow up for  pelvic ultrasound and follow-up appointment.    Counseling:     Patient was counseled today on the recommendation for yearly wellness exams, importance of breast self awareness and annual mammograms, as well as the current ASCCP pap guidelines. She was counseled on importance of regular exercise. She is to see her PCP for other health maintenance.     Use of the [facility] Patient Portal discussed and encouraged during today's visit.          [1]   Current Outpatient Medications:     albuterol (PROVENTIL/VENTOLIN HFA) 90 mcg/actuation inhaler, Inhale 2 puffs into the lungs every 6 (six) hours as needed for Wheezing or Shortness of Breath. Rescue, Disp: 18 g, Rfl: 2    ammonium lactate 12 % Crea, Apply twice daily to affected parts both feet as needed., Disp: 140 g, Rfl: 11    azelastine (ASTELIN) 137 mcg (0.1 %) nasal spray, 1 spray (137 mcg total) by Nasal route 2 (two) times daily., Disp: 30 mL, Rfl: 11    benzonatate (TESSALON) 100 MG capsule, Take 1 capsule (100 mg total) by mouth 3 (three) times daily as needed for Cough., Disp: 30 capsule, Rfl: 0    celecoxib (CELEBREX) 200 MG capsule, Take 1 capsule (200 mg total) by mouth once daily., Disp: 30 capsule, Rfl: 1    fluticasone propionate (FLONASE) 50 mcg/actuation nasal spray, 2 sprays (100 mcg total) by Each Nostril route daily as needed., Disp: 15.8 mL, Rfl: 0    ibuprofen (ADVIL,MOTRIN) 600 MG tablet, Take 1 tablet (600 mg total) by mouth every 6 (six) hours as needed for Pain. Take with meals., Disp: 30 tablet, Rfl: 0    levocetirizine (XYZAL) 5 MG tablet, Take 5 mg by mouth every evening., Disp: , Rfl:     montelukast (SINGULAIR) 10 mg tablet, TAKE 1 TABLET BY MOUTH EVERY DAY IN THE EVENING, Disp: 30 tablet, Rfl: 0    mv-min/iron/folic/calcium/vitK (WOMEN'S MULTIVITAMIN ORAL), Take by mouth., Disp: , Rfl:     pantoprazole (PROTONIX) 40 MG tablet, Take 1 tablet (40 mg total) by mouth 2 (two) times daily. for 14 days, Disp: 28 tablet, Rfl: 0    sodium chloride  (SALINE NASAL) 0.65 % nasal spray, 1 spray by Nasal route once daily., Disp: 60 mL, Rfl: 0    XULANE 150-35 mcg/24 hr, Place 1 patch onto the skin once a week., Disp: 12 patch, Rfl: 3    amoxicillin-clavulanate 875-125mg (AUGMENTIN) 875-125 mg per tablet, Take 1 tablet by mouth every 12 (twelve) hours. (Patient not taking: Reported on 4/21/2025), Disp: 6 tablet, Rfl: 0    clotrimazole-betamethasone 1-0.05% (LOTRISONE) cream, Apply topically 2 (two) times daily. for 14 days, Disp: 45 g, Rfl: 0    fluconazole (DIFLUCAN) 150 MG Tab, Take 1 tablet (150 mg total) by mouth once. for 1 dose, Disp: 1 tablet, Rfl: 0    medroxyPROGESTERone (PROVERA) 10 MG tablet, Take 2 tablets (20 mg total) by mouth 3 (three) times daily. Every 1-2 mths if no menses for 7 days, Disp: 42 tablet, Rfl: 0  [2]   Social History  Tobacco Use    Smoking status: Former     Current packs/day: 0.50     Average packs/day: 0.5 packs/day for 1 year (0.5 ttl pk-yrs)     Types: Cigarettes    Smokeless tobacco: Never   Substance Use Topics    Alcohol use: Not Currently    Drug use: Never

## 2025-04-22 ENCOUNTER — OFFICE VISIT (OUTPATIENT)
Dept: INTERNAL MEDICINE | Facility: CLINIC | Age: 40
End: 2025-04-22
Payer: COMMERCIAL

## 2025-04-22 ENCOUNTER — LAB VISIT (OUTPATIENT)
Dept: LAB | Facility: OTHER | Age: 40
End: 2025-04-22
Attending: STUDENT IN AN ORGANIZED HEALTH CARE EDUCATION/TRAINING PROGRAM
Payer: COMMERCIAL

## 2025-04-22 ENCOUNTER — RESULTS FOLLOW-UP (OUTPATIENT)
Dept: OBSTETRICS AND GYNECOLOGY | Facility: CLINIC | Age: 40
End: 2025-04-22

## 2025-04-22 VITALS
SYSTOLIC BLOOD PRESSURE: 122 MMHG | BODY MASS INDEX: 46.38 KG/M2 | HEIGHT: 66 IN | OXYGEN SATURATION: 98 % | DIASTOLIC BLOOD PRESSURE: 80 MMHG | WEIGHT: 288.56 LBS | HEART RATE: 108 BPM

## 2025-04-22 DIAGNOSIS — A04.8 H. PYLORI INFECTION: ICD-10-CM

## 2025-04-22 DIAGNOSIS — Z13.6 SCREENING FOR CARDIOVASCULAR CONDITION: ICD-10-CM

## 2025-04-22 DIAGNOSIS — J30.2 SEASONAL ALLERGIES: ICD-10-CM

## 2025-04-22 DIAGNOSIS — R73.03 PREDIABETES: ICD-10-CM

## 2025-04-22 DIAGNOSIS — D64.9 ANEMIA, UNSPECIFIED TYPE: ICD-10-CM

## 2025-04-22 DIAGNOSIS — N92.0 MENORRHAGIA WITH REGULAR CYCLE: ICD-10-CM

## 2025-04-22 DIAGNOSIS — K52.9 CHRONIC DIARRHEA: ICD-10-CM

## 2025-04-22 DIAGNOSIS — Z91.09 ENVIRONMENTAL ALLERGIES: ICD-10-CM

## 2025-04-22 DIAGNOSIS — E55.9 VITAMIN D DEFICIENCY: ICD-10-CM

## 2025-04-22 DIAGNOSIS — E66.01 CLASS 3 SEVERE OBESITY DUE TO EXCESS CALORIES WITH SERIOUS COMORBIDITY AND BODY MASS INDEX (BMI) OF 50.0 TO 59.9 IN ADULT: ICD-10-CM

## 2025-04-22 DIAGNOSIS — J30.89 NON-SEASONAL ALLERGIC RHINITIS, UNSPECIFIED TRIGGER: ICD-10-CM

## 2025-04-22 DIAGNOSIS — L21.9 SEBORRHEIC DERMATITIS: ICD-10-CM

## 2025-04-22 DIAGNOSIS — Z00.00 HEALTH MAINTENANCE EXAMINATION: Primary | ICD-10-CM

## 2025-04-22 DIAGNOSIS — E66.813 CLASS 3 SEVERE OBESITY DUE TO EXCESS CALORIES WITH SERIOUS COMORBIDITY AND BODY MASS INDEX (BMI) OF 50.0 TO 59.9 IN ADULT: ICD-10-CM

## 2025-04-22 DIAGNOSIS — Z00.00 HEALTH MAINTENANCE EXAMINATION: ICD-10-CM

## 2025-04-22 LAB
25(OH)D3+25(OH)D2 SERPL-MCNC: 13 NG/ML (ref 30–96)
ABSOLUTE EOSINOPHIL (OHS): 0.37 K/UL
ABSOLUTE MONOCYTE (OHS): 0.58 K/UL (ref 0.3–1)
ABSOLUTE NEUTROPHIL COUNT (OHS): 3.49 K/UL (ref 1.8–7.7)
ALBUMIN SERPL BCP-MCNC: 3.8 G/DL (ref 3.5–5.2)
ALP SERPL-CCNC: 93 UNIT/L (ref 40–150)
ALT SERPL W/O P-5'-P-CCNC: 54 UNIT/L (ref 10–44)
ANION GAP (OHS): 8 MMOL/L (ref 8–16)
AST SERPL-CCNC: 26 UNIT/L (ref 11–45)
BASOPHILS # BLD AUTO: 0.03 K/UL
BASOPHILS NFR BLD AUTO: 0.4 %
BILIRUB SERPL-MCNC: 0.3 MG/DL (ref 0.1–1)
BUN SERPL-MCNC: 7 MG/DL (ref 6–20)
CALCIUM SERPL-MCNC: 9.3 MG/DL (ref 8.7–10.5)
CHLORIDE SERPL-SCNC: 100 MMOL/L (ref 95–110)
CHOLEST SERPL-MCNC: 208 MG/DL (ref 120–199)
CHOLEST/HDLC SERPL: 5.3 {RATIO} (ref 2–5)
CO2 SERPL-SCNC: 25 MMOL/L (ref 23–29)
CREAT SERPL-MCNC: 0.8 MG/DL (ref 0.5–1.4)
EAG (OHS): 237 MG/DL (ref 68–131)
ERYTHROCYTE [DISTWIDTH] IN BLOOD BY AUTOMATED COUNT: 13.5 % (ref 11.5–14.5)
FERRITIN SERPL-MCNC: 170 NG/ML (ref 20–300)
FOLATE SERPL-MCNC: 13.6 NG/ML (ref 4–24)
GFR SERPLBLD CREATININE-BSD FMLA CKD-EPI: >60 ML/MIN/1.73/M2
GLUCOSE SERPL-MCNC: 275 MG/DL (ref 70–110)
HBA1C MFR BLD: 9.9 % (ref 4–5.6)
HCT VFR BLD AUTO: 41.7 % (ref 37–48.5)
HDLC SERPL-MCNC: 39 MG/DL (ref 40–75)
HDLC SERPL: 18.8 % (ref 20–50)
HGB BLD-MCNC: 13.8 GM/DL (ref 12–16)
IMM GRANULOCYTES # BLD AUTO: 0.03 K/UL (ref 0–0.04)
IMM GRANULOCYTES NFR BLD AUTO: 0.4 % (ref 0–0.5)
IRON SATN MFR SERPL: 23 % (ref 20–50)
IRON SERPL-MCNC: 80 UG/DL (ref 30–160)
LDLC SERPL CALC-MCNC: 104.6 MG/DL (ref 63–159)
LYMPHOCYTES # BLD AUTO: 3.49 K/UL (ref 1–4.8)
MCH RBC QN AUTO: 26 PG (ref 27–31)
MCHC RBC AUTO-ENTMCNC: 33.1 G/DL (ref 32–36)
MCV RBC AUTO: 79 FL (ref 82–98)
NONHDLC SERPL-MCNC: 169 MG/DL
NUCLEATED RBC (/100WBC) (OHS): 0 /100 WBC
PLATELET # BLD AUTO: 301 K/UL (ref 150–450)
PMV BLD AUTO: 11.1 FL (ref 9.2–12.9)
POTASSIUM SERPL-SCNC: 4.2 MMOL/L (ref 3.5–5.1)
PROT SERPL-MCNC: 8.1 GM/DL (ref 6–8.4)
RBC # BLD AUTO: 5.3 M/UL (ref 4–5.4)
RELATIVE EOSINOPHIL (OHS): 4.6 %
RELATIVE LYMPHOCYTE (OHS): 43.7 % (ref 18–48)
RELATIVE MONOCYTE (OHS): 7.3 % (ref 4–15)
RELATIVE NEUTROPHIL (OHS): 43.6 % (ref 38–73)
SODIUM SERPL-SCNC: 133 MMOL/L (ref 136–145)
TIBC SERPL-MCNC: 345 UG/DL (ref 250–450)
TRANSFERRIN SERPL-MCNC: 233 MG/DL (ref 200–375)
TRIGL SERPL-MCNC: 322 MG/DL (ref 30–150)
VIT B12 SERPL-MCNC: 626 PG/ML (ref 210–950)
WBC # BLD AUTO: 7.99 K/UL (ref 3.9–12.7)

## 2025-04-22 PROCEDURE — 80061 LIPID PANEL: CPT

## 2025-04-22 PROCEDURE — 82746 ASSAY OF FOLIC ACID SERUM: CPT

## 2025-04-22 PROCEDURE — 83036 HEMOGLOBIN GLYCOSYLATED A1C: CPT

## 2025-04-22 PROCEDURE — 99999 PR PBB SHADOW E&M-EST. PATIENT-LVL IV: CPT | Mod: PBBFAC,,, | Performed by: STUDENT IN AN ORGANIZED HEALTH CARE EDUCATION/TRAINING PROGRAM

## 2025-04-22 PROCEDURE — 82728 ASSAY OF FERRITIN: CPT

## 2025-04-22 PROCEDURE — 85025 COMPLETE CBC W/AUTO DIFF WBC: CPT

## 2025-04-22 PROCEDURE — 82607 VITAMIN B-12: CPT

## 2025-04-22 PROCEDURE — 83540 ASSAY OF IRON: CPT

## 2025-04-22 PROCEDURE — 36415 COLL VENOUS BLD VENIPUNCTURE: CPT

## 2025-04-22 PROCEDURE — 82310 ASSAY OF CALCIUM: CPT

## 2025-04-22 PROCEDURE — 82306 VITAMIN D 25 HYDROXY: CPT

## 2025-04-22 RX ORDER — PANTOPRAZOLE SODIUM 40 MG/1
TABLET, DELAYED RELEASE ORAL
Qty: 30 TABLET | Refills: 1 | Status: SHIPPED | OUTPATIENT
Start: 2025-04-22 | End: 2025-06-21

## 2025-04-22 RX ORDER — AZELASTINE 1 MG/ML
1 SPRAY, METERED NASAL 2 TIMES DAILY
Qty: 30 ML | Refills: 11 | Status: SHIPPED | OUTPATIENT
Start: 2025-04-22

## 2025-04-22 RX ORDER — FEXOFENADINE HCL 180 MG/1
180 TABLET ORAL DAILY
Qty: 90 TABLET | Refills: 3 | Status: SHIPPED | OUTPATIENT
Start: 2025-04-22

## 2025-04-22 RX ORDER — PREDNISONE 20 MG/1
20 TABLET ORAL DAILY
Qty: 5 TABLET | Refills: 0 | Status: SHIPPED | OUTPATIENT
Start: 2025-04-22 | End: 2025-04-27

## 2025-04-22 RX ORDER — KETOCONAZOLE 20 MG/ML
SHAMPOO, SUSPENSION TOPICAL
Qty: 120 ML | Refills: 5 | Status: SHIPPED | OUTPATIENT
Start: 2025-04-24

## 2025-04-22 RX ORDER — MONTELUKAST SODIUM 10 MG/1
10 TABLET ORAL NIGHTLY
Qty: 90 TABLET | Refills: 1 | Status: SHIPPED | OUTPATIENT
Start: 2025-04-22

## 2025-04-22 NOTE — PROGRESS NOTES
Subjective:       Patient ID: Shaina Mas is a 39 y.o. female.    Chief Complaint: Health maintenance examination [Z00.00]    Patient is established with me, here today for the following:    History of Present Illness      HEADACHES:  She reports frontal headaches radiating over the head for the past 2 months, occurring 4-5 days per week at the end of the day, before sleep, or upon waking. She takes ibuprofen for relief. She has a history of sinus headaches since childhood (age 8-9). She denies associated nausea or vomiting.    RESPIRATORY:  She reports cough that worsens at night, particularly when lying down. During daytime, cough is less frequent but triggered by smells and throat irritation. She takes DayQuil in the morning and NyQuil at night for symptom management. She uses albuterol inhaler when experiencing shortness of breath, which provides relief. Last use was yesterday requiring 2 puffs.    DERMATOLOGIC:  She reports pressure on her scalp with large flakes and burning sensation that persisted for two weeks. The condition is itchy, though she cannot assess if the underlying skin is red. She uses a vinegar-based dandruff shampoo before regular shampooing, which provides temporary relief. The condition has improved and is currently less bothersome. She experiences skin sensitivity to costume jewelry resulting in a rash with discharge, which she self-treated with alcohol application leading to drainage.    GYNECOLOGIC:  She reports vaginal bleeding for two weeks and developed a yeast infection with associated itching, which her gynecologist attributed to recent antibiotic use.    GASTROINTESTINAL:  She reports recent return of diarrhea and is currently monitoring for potential dietary triggers.    ALLERGIES:  She uses Flonase nasal spray in the morning and occasionally at night, but not 3 times daily as prescribed. She tried Zyrtec without benefit and denies current antihistamine use.      ROS:  Eyes:  +blurry vision, +photophobia  Respiratory: +cough, +shortness of breath  Gastrointestinal: -nausea  Genitourinary: +excessive urination  Skin: +rash, +itching  Neurological: +headache  Female Genitourinary: +excessive vaginal bleeding, +vaginal itching or burning  Head: +head pain  Endocrine: +excessive thirst          Health maintenance -   Denies family history of colorectal cancer.  Denies family history of breast cancer.  Denies family history of ovarian cancer.  Last pap performed MAR2024.   Denies history of abnormal pap smear.  Denies family history of osteoporosis.  Denies significant family history of cardiac disease.  UTD on COVID primary/booster, Tdap vaccinations.  Due for influenza, COVID vaccinations.  Unsure HPV vaccination status.   Started smoking at age 24/23, rarely at first, at age 36 at most <0.5 PPD.   Down to 2-3 cigarettes per weekend.  Drinks alcohol 1 time monthly, 1 drinks per sitting.  Denies drug use.  Completed HIV and hepatitis C screening.  Lab Results   Component Value Date    LDLCALC 135.4 01/31/2024      Began menarche at age 17/20.   Never previously pregnant.   Currently sexually active with male partner.      Prediabetes -   Lab Results   Component Value Date    HGBA1C 6.3 (H) 01/31/2024    HGBA1C 6.2 (H) 12/29/2022     Anemia -   Denies known personal history of thalassemia or sickle cell   Unsure family history of thalassemia or sickle cell   Lab Results   Component Value Date    HGB 12.5 04/21/2025    HCT 38.4 04/21/2025    MCV 81 (L) 04/21/2025    RDW 13.6 04/21/2025     Lab Results   Component Value Date    IRON 31 01/31/2024    TRANSFERRIN 257 01/31/2024    TIBC 380 01/31/2024    FESATURATED 8 (L) 01/31/2024      Lab Results   Component Value Date    FERRITIN 76 01/31/2024     Lab Results   Component Value Date    ZUMGPXQR22 470 01/31/2024     Lab Results   Component Value Date    FOLATE 13.2 01/31/2024     Recheck H pylori    Current Outpatient Medications   Medication  "Instructions    albuterol (PROVENTIL/VENTOLIN HFA) 90 mcg/actuation inhaler 2 puffs, Inhalation, Every 6 hours PRN, Rescue    ammonium lactate 12 % Crea Apply twice daily to affected parts both feet as needed.    amoxicillin-clavulanate 875-125mg (AUGMENTIN) 875-125 mg per tablet 1 tablet, Oral, Every 12 hours    azelastine (ASTELIN) 137 mcg, Nasal, 2 times daily    benzonatate (TESSALON) 100 mg, Oral, 3 times daily PRN    celecoxib (CELEBREX) 200 mg, Oral, Daily    clotrimazole-betamethasone 1-0.05% (LOTRISONE) cream Topical (Top), 2 times daily    fluticasone propionate (FLONASE) 100 mcg, Each Nostril, Daily PRN    ibuprofen (ADVIL,MOTRIN) 600 mg, Oral, Every 6 hours PRN, Take with meals.    levocetirizine (XYZAL) 5 mg, Nightly    medroxyPROGESTERone (PROVERA) 20 mg, Oral, 3 times daily, Every 1-2 mths if no menses    montelukast (SINGULAIR) 10 mg tablet TAKE 1 TABLET BY MOUTH EVERY DAY IN THE EVENING    mv-min/iron/folic/calcium/vitK (WOMEN'S MULTIVITAMIN ORAL) Take by mouth.    pantoprazole (PROTONIX) 40 mg, Oral, 2 times daily    sodium chloride (SALINE NASAL) 0.65 % nasal spray 1 spray, Nasal, Daily    XULANE 150-35 mcg/24 hr 1 patch, Transdermal, Weekly     Objective:      Vitals:    04/22/25 1338   BP: 122/80   Pulse: 108   SpO2: 98%   Weight: 130.9 kg (288 lb 9.3 oz)   Height: 5' 6" (1.676 m)   PainSc: 0-No pain     Body mass index is 46.58 kg/m².    Physical Exam  Vitals reviewed.   Constitutional:       General: She is not in acute distress.     Appearance: Normal appearance. She is not ill-appearing or diaphoretic.   HENT:      Head: Normocephalic and atraumatic.      Right Ear: Tympanic membrane, ear canal and external ear normal. There is no impacted cerumen.      Left Ear: Tympanic membrane, ear canal and external ear normal. There is no impacted cerumen.      Nose: Nose normal. No rhinorrhea.      Mouth/Throat:      Mouth: Mucous membranes are moist.      Pharynx: Oropharynx is clear. No " oropharyngeal exudate or posterior oropharyngeal erythema.   Eyes:      General: No scleral icterus.        Right eye: No discharge.         Left eye: No discharge.      Conjunctiva/sclera: Conjunctivae normal.   Neck:      Thyroid: No thyromegaly or thyroid tenderness.      Trachea: Trachea normal.   Cardiovascular:      Rate and Rhythm: Normal rate and regular rhythm.      Heart sounds: Normal heart sounds. No murmur heard.     No friction rub. No gallop.   Pulmonary:      Effort: Pulmonary effort is normal. No respiratory distress.      Breath sounds: Normal breath sounds. No stridor. No wheezing, rhonchi or rales.   Abdominal:      General: There is no distension.      Palpations: Abdomen is soft.      Tenderness: There is no abdominal tenderness. There is no guarding or rebound.   Musculoskeletal:         General: No swelling or deformity.      Cervical back: Neck supple.   Lymphadenopathy:      Head:      Right side of head: No submandibular or posterior auricular adenopathy.      Left side of head: No submandibular or posterior auricular adenopathy.      Cervical: No cervical adenopathy.      Right cervical: No superficial, deep or posterior cervical adenopathy.     Left cervical: No superficial, deep or posterior cervical adenopathy.      Upper Body:      Right upper body: No supraclavicular adenopathy.      Left upper body: No supraclavicular adenopathy.   Skin:     General: Skin is warm and dry.   Neurological:      General: No focal deficit present.      Mental Status: She is alert. Mental status is at baseline.      Gait: Gait normal.   Psychiatric:         Mood and Affect: Mood normal.         Behavior: Behavior normal.         Assessment:       1. Health maintenance examination    2. Screening for cardiovascular condition    3. Prediabetes    4. Anemia, unspecified type    5. Menorrhagia with regular cycle    6. Vitamin D deficiency    7. Chronic diarrhea    8. Class 3 severe obesity due to excess  calories with serious comorbidity and body mass index (BMI) of 50.0 to 59.9 in adult    9. Environmental allergies    10. Seasonal allergies    11. H. pylori infection    12. Non-seasonal allergic rhinitis, unspecified trigger    13. Seborrheic dermatitis        Plan:   Health maintenance examination  -     Comprehensive Metabolic Panel; Future; Expected date: 04/22/2025  -     Lipid Panel; Future; Expected date: 04/22/2025  -     Hemoglobin A1C; Future; Expected date: 04/22/2025  -     CBC Auto Differential; Future; Expected date: 04/22/2025  -     Vitamin D; Future; Expected date: 04/22/2025  -     Vitamin B12; Future; Expected date: 04/22/2025  -     Folate; Future; Expected date: 04/22/2025  -     Iron and TIBC; Future; Expected date: 04/22/2025  -     Ferritin; Future; Expected date: 04/22/2025  -     Microalbumin/Creatinine Ratio, Urine; Future; Expected date: 04/22/2025  -     Urinalysis, Reflex to Urine Culture; Future; Expected date: 04/22/2025    Screening for cardiovascular condition  -     Lipid Panel; Future; Expected date: 04/22/2025    Prediabetes  -     Hemoglobin A1C; Future; Expected date: 04/22/2025  -     Microalbumin/Creatinine Ratio, Urine; Future; Expected date: 04/22/2025  -     Urinalysis, Reflex to Urine Culture; Future; Expected date: 04/22/2025    Anemia, unspecified type  -     CBC Auto Differential; Future; Expected date: 04/22/2025  -     Vitamin B12; Future; Expected date: 04/22/2025  -     Folate; Future; Expected date: 04/22/2025  -     Iron and TIBC; Future; Expected date: 04/22/2025  -     Ferritin; Future; Expected date: 04/22/2025    Menorrhagia with regular cycle  -     CBC Auto Differential; Future; Expected date: 04/22/2025  -     Vitamin B12; Future; Expected date: 04/22/2025  -     Folate; Future; Expected date: 04/22/2025  -     Iron and TIBC; Future; Expected date: 04/22/2025  -     Ferritin; Future; Expected date: 04/22/2025    Vitamin D deficiency  -     Vitamin D;  Future; Expected date: 04/22/2025    Chronic diarrhea  -     H. pylori antigen, stool; Future; Expected date: 04/22/2025    Class 3 severe obesity due to excess calories with serious comorbidity and body mass index (BMI) of 50.0 to 59.9 in adult    Environmental allergies  -     fexofenadine (ALLEGRA) 180 MG tablet; Take 1 tablet (180 mg total) by mouth once daily.  Dispense: 90 tablet; Refill: 3  -     Ambulatory referral/consult to Allergy; Future; Expected date: 04/29/2025  -     predniSONE (DELTASONE) 20 MG tablet; Take 1 tablet (20 mg total) by mouth once daily. for 5 days  Dispense: 5 tablet; Refill: 0    Seasonal allergies  -     montelukast (SINGULAIR) 10 mg tablet; Take 1 tablet (10 mg total) by mouth every evening.  Dispense: 90 tablet; Refill: 1    H. pylori infection  -     pantoprazole (PROTONIX) 40 MG tablet; Take 1 tablet (40 mg total) by mouth once daily for 30 days, THEN 1 tablet (40 mg total) daily as needed (reflux).  Dispense: 30 tablet; Refill: 1    Non-seasonal allergic rhinitis, unspecified trigger  -     azelastine (ASTELIN) 137 mcg (0.1 %) nasal spray; 1 spray (137 mcg total) by Nasal route 2 (two) times daily.  Dispense: 30 mL; Refill: 11    Seborrheic dermatitis  -     ketoconazole (NIZORAL) 2 % shampoo; Apply 5 to 10 mL to wet scalp, lather, leave on 3 to 5 minutes, and rinse thoroughly.  Dispense: 120 mL; Refill: 5        Assessment & Plan      IMPRESSION:  - Suspect headaches and cough are likely due to significant allergies and sinus congestion.  - Concerned about potential diabetes given previous borderline results and reported increased thirst.  - Scalp condition appears to be seborrheic dermatitis rather than psoriasis based on presentation and response to vinegar shampoo.  - Considered H. pylori as potential cause of recurring GI symptoms.    PLAN SUMMARY:  - Started Ketoconazole shampoo 2-3 times/week, then reduce to 1-2 times/week for maintenance  - Started Pantoprazole daily for  30 days, then as needed for reflux  - Ordered H. pylori test  - Ordered labs to determine diabetes status  - Prescribed Benzonatate for cough management  - Continue Albuterol as needed for cough or dyspnea  - Ordered labs to check iron levels  - Shaina to avoid nickel-containing jewelry  - Follow-up after lab results to discuss potential diabetes diagnosis and weight loss medication options    OBESITY, CLASS 3:  - Monitored patient's blood sugar, which were close to diabetes range.  - Shaina reports increased thirst and urination.  - Ordered labs to determine if the patient is diabetic, which would affect treatment options for obesity.  - Explored GLP-1 agonists as weight loss options with the patient.  - Discussed the implications of potential diabetes diagnosis for weight loss medication coverage.    UNSPECIFIED CHRONIC BRONCHITIS:  - Continued Albuterol use as needed for cough or dyspnea.  - Prescribed Benzonatate for cough management, particularly for nighttime symptoms.  - Shaina reports using NyQuil and DayQuil for symptom management.    SEBORRHEIC DERMATITIS:  - Explained seborrheic dermatitis as overgrowth of normal skin organisms, common in humid environments.  - Started Ketoconazole shampoo: use 2-3 times per week initially, then reduce to 1-2 times per week for maintenance.  - Apply to scalp, leave for 3-5 minutes before rinsing.    CONTACT DERMATITIS:  - Shaina to avoid jewelry containing nickel to prevent skin irritation.    GASTROESOPHAGEAL REFLUX DISEASE (GERD):  - Started Pantoprazole: take daily for 30 days, then as needed for reflux.  - Ordered H. pylori test.    LABS:  - Ordered labs to check iron levels and H. pylori test.         40 minutes were spent in chart review, documentation and review of results, and evaluation, treatment, and counseling of patient on the same day of service.    Bernadette Reich MD  4/22/2025

## 2025-04-22 NOTE — PATIENT INSTRUCTIONS
For headaches:   - Tylenol 1,000 mg every 8 hours or 500 mg every 4 hours.    - Ibuprofen 600 mg every 6 hours.  Sent azelastine for sinus congestion to pharmacy. Use azelastine nasal spray twice daily for the next week, then as needed afterwards.  Continue fluticasone nasal spray in them morning.  Continue nasal saline or distilled water nasal rinses in the evening for using azelastine spray.  Start Allegra once daily.   Restart montelukast (Singulair, sent to the pharmacy) nightly before bed.   Start prednisone for 5 days for symptom management.   Use albuterol as needed for cough or shortness of breath.     Take pantoprazole (Protonix) once daily for 1 month, then as needed for reflux after that.     Dr. Chrissie Davila   Address: [address] [place] [id number]  Phone: [phone]

## 2025-04-24 ENCOUNTER — OFFICE VISIT (OUTPATIENT)
Dept: ALLERGY | Facility: CLINIC | Age: 40
End: 2025-04-24
Payer: COMMERCIAL

## 2025-04-24 ENCOUNTER — LAB VISIT (OUTPATIENT)
Dept: LAB | Facility: HOSPITAL | Age: 40
End: 2025-04-24
Payer: COMMERCIAL

## 2025-04-24 VITALS — WEIGHT: 285.94 LBS | BODY MASS INDEX: 45.95 KG/M2 | HEIGHT: 66 IN

## 2025-04-24 DIAGNOSIS — J31.0 CHRONIC RHINITIS: ICD-10-CM

## 2025-04-24 DIAGNOSIS — J45.20 MILD INTERMITTENT ASTHMA WITHOUT COMPLICATION: ICD-10-CM

## 2025-04-24 DIAGNOSIS — J31.0 CHRONIC RHINITIS: Primary | ICD-10-CM

## 2025-04-24 DIAGNOSIS — R05.9 COUGH, UNSPECIFIED TYPE: ICD-10-CM

## 2025-04-24 PROCEDURE — 99999 PR PBB SHADOW E&M-EST. PATIENT-LVL IV: CPT | Mod: PBBFAC,,, | Performed by: STUDENT IN AN ORGANIZED HEALTH CARE EDUCATION/TRAINING PROGRAM

## 2025-04-24 PROCEDURE — 82785 ASSAY OF IGE: CPT

## 2025-04-24 PROCEDURE — 3008F BODY MASS INDEX DOCD: CPT | Mod: CPTII,S$GLB,, | Performed by: STUDENT IN AN ORGANIZED HEALTH CARE EDUCATION/TRAINING PROGRAM

## 2025-04-24 PROCEDURE — 99204 OFFICE O/P NEW MOD 45 MIN: CPT | Mod: S$GLB,,, | Performed by: STUDENT IN AN ORGANIZED HEALTH CARE EDUCATION/TRAINING PROGRAM

## 2025-04-24 PROCEDURE — 36415 COLL VENOUS BLD VENIPUNCTURE: CPT

## 2025-04-24 PROCEDURE — 3046F HEMOGLOBIN A1C LEVEL >9.0%: CPT | Mod: CPTII,S$GLB,, | Performed by: STUDENT IN AN ORGANIZED HEALTH CARE EDUCATION/TRAINING PROGRAM

## 2025-04-24 PROCEDURE — 1159F MED LIST DOCD IN RCRD: CPT | Mod: CPTII,S$GLB,, | Performed by: STUDENT IN AN ORGANIZED HEALTH CARE EDUCATION/TRAINING PROGRAM

## 2025-04-24 RX ORDER — AMOXICILLIN 875 MG/1
875 TABLET, FILM COATED ORAL 2 TIMES DAILY
COMMUNITY
Start: 2025-04-06

## 2025-04-24 NOTE — PROGRESS NOTES
"[facility]       HISTORY OF PRESENT ILLNESS   Referral from: Dr. Bernadette Reich  CC: rhinitis     HPI: Shaina Mas is a 39 y.o. female  History obtained from patient.     Chronic rhinitis: for many years. Every Spring rhinorrhea, morning sneeze and cough. Nose itching. More HA lately. Does snore per her . Sinus HA.   Meds: cetirizine in the past and makes her drowsy. Allegra every day. Fluticasone qhs recently (before), nasal saline. Recently added azelastine qam  Environment: noted itching with dust. 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 No pets. No carpet. No water damage or visible.  smokes outside not in the house.    ENT: none. No anosmia.     Atopic dermatitis: hands only and worse in the winter. Uses OTC maybe hydrocortisone and it goes away after a few days.     Intermittent asthma: In the past no asthma. During Covid she had inhaler. Sick and allergy season cough and shortness of breath. Also strong smells. DORA helps. Feels PND. Recently started on Protonix with less cough. Also taking tessalon pearls.   DORA: does not use weekly or monthly   Nightly cough: 2x a week. She takes Nyquil pm which prevent the cough   No steroids.      MEDICAL HISTORY   SurgHx:  Past Surgical History:   Procedure Laterality Date    NO PAST SURGERIES       Pre diabetes   HA taking ibuprofen     PHYSICAL EXAM   VS: Ht 5' 6" (1.676 m)   Wt 129.7 kg (285 lb 15 oz)   LMP 04/08/2025   BMI 46.15 kg/m²   GENERAL: NAD, well nourished, well appearing     LABS AND IMAGING     None      ASSESSMENT & PLAN     Chronic rhinitis: chronic rhinitis for years. Noted triggers dust and grass.     -Indoor and outdoor immunocaps   -Optimize nasal sprays with fluticasone and azelastine 1 sen BID assess if this improved night club.     Chronic cough: recently started PPI.     -If not improved will consider a controller to assess for persistent asthma.     Intermittent asthma: flares " with viral URI and season changes.     -Albuterol 2P PRN cough, shortness of breath, wheezing     Follow up: 6 weeks         Wally Beasley MD   [facility]

## 2025-04-24 NOTE — PATIENT INSTRUCTIONS
Nasal saline twice a day. Followed by one spray of each nasal spray (fluticasone and azelastine).

## 2025-04-25 ENCOUNTER — OFFICE VISIT (OUTPATIENT)
Dept: OBSTETRICS AND GYNECOLOGY | Facility: CLINIC | Age: 40
End: 2025-04-25
Payer: COMMERCIAL

## 2025-04-25 VITALS
HEIGHT: 66 IN | DIASTOLIC BLOOD PRESSURE: 81 MMHG | WEIGHT: 286.38 LBS | BODY MASS INDEX: 46.02 KG/M2 | SYSTOLIC BLOOD PRESSURE: 117 MMHG

## 2025-04-25 DIAGNOSIS — N93.9 ABNORMAL UTERINE BLEEDING (AUB): Primary | ICD-10-CM

## 2025-04-25 DIAGNOSIS — B37.31 VULVOVAGINAL CANDIDIASIS: ICD-10-CM

## 2025-04-25 DIAGNOSIS — E28.2 PCOS (POLYCYSTIC OVARIAN SYNDROME): ICD-10-CM

## 2025-04-25 LAB
BACTERIAL VAGINOSIS DNA (OHS): NOT DETECTED
CANDIDA GLABRATA/KRUSEI DNA (OHS): NOT DETECTED
CANDIDA SPECIES DNA (OHS): DETECTED
TRICHOMONAS VAGINALIS DNA (OHS): NOT DETECTED

## 2025-04-25 PROCEDURE — 99999 PR PBB SHADOW E&M-EST. PATIENT-LVL IV: CPT | Mod: PBBFAC,,, | Performed by: NURSE PRACTITIONER

## 2025-04-25 RX ORDER — DIAZEPAM 5 MG/1
5 TABLET ORAL ONCE
Qty: 1 TABLET | Refills: 0 | Status: SHIPPED | OUTPATIENT
Start: 2025-04-25 | End: 2025-04-25

## 2025-04-25 RX ORDER — FLUCONAZOLE 150 MG/1
150 TABLET ORAL
Qty: 2 TABLET | Refills: 0 | Status: SHIPPED | OUTPATIENT
Start: 2025-04-25 | End: 2025-04-29

## 2025-04-25 RX ORDER — NORELGESTROMIN AND ETHINYL ESTRADIOL 35; 150 UG/MG; UG/MG
PATCH TRANSDERMAL
Qty: 12 PATCH | Refills: 4 | Status: SHIPPED | OUTPATIENT
Start: 2025-04-25

## 2025-04-25 NOTE — PROGRESS NOTES
"Chief Complaint: AUB Follow-up     HPI:      Shaina is a 39 y.o.  who presents today for AUB follow-up.      Menarche at age 20.   Menses are irregular. Patient's last menstrual period was 2025 (exact date).   H/o oligomenorrhea and menometrorrhagia when not on birth control. See below for current AUB history. She is currently taking Provera 20 mg 3x/day - AUB has resolved.     She does c/o hirsutism.   Testosterone panel wnl except SHBG elevated and prolactin wnl (2023)  Recent TSH wnl (2025)  Recent A1c 9.9, ALT 54. , , HDL 18 (2025)    Results for orders placed during the hospital encounter of 25    US Pelvis Comp with Transvag NON-OB (xpd    Impression  No acute process seen.    Endometrium is heterogeneous but normal in thickness.      Electronically signed by: Alec Cheung MD  Date:    2025  Time:    08:48       Shaina is currently sexually active with a single male partner. She is currently using no method for contraception - interested in pregnancy.    Previous Pap: NILM, HPV negative (3/28/2024)    2025  Shaina is a 39 y.o.  who presents today for well woman exam. She has the following concerns today - 1) AUB. She stopped patches in 2025. Periods regular after stopping patches.   Periods monthly, last 5-6 days with light to heavy flow.   LMP: 4/8. Bleeding almost daily since. Flow is light to moderate.   Prior to this period was beginning of March.   2) Labial swelling ongoing since Thursday or Friday - painful. She took antibiotics prior to noticing the swelling. She denies any odor. She is c/o itching, "pinching", pain with sitting, "burning". She changed menstrual products and has noticed that the swelling has gone down.     Denies any breast, urinary complaints or pelvic pain today.     Menses are irregular. Patient's last menstrual period was 2025.      Shaina is currently sexually active with a single male partner. She is currently using " "no method for contraception - interested in pregnancy. She is not currently taking a prenatal vitamin.      Previous Pap: NILM, HPV negative (03/2024)     Gardasil:Pt Unsure      She denies family history of breast, GYN, colon, or  cancers.       She does participate in regular exercise.  She denies tobacco use. Former.      PCP: Bernadette Reich MD     Past Medical History:   Diagnosis Date    Eczema     Prediabetes     Recurrent upper respiratory infection (URI)     Smoker      Current Medications[1]   Review of patient's allergies indicates:   Allergen Reactions    Milk containing products (dairy) Diarrhea and Nausea And Vomiting     Past Surgical History:   Procedure Laterality Date    NO PAST SURGERIES       Social History[2]  Family History   Problem Relation Name Age of Onset    Allergies Mother      Hypertension Mother      Diabetes Father      Hyperlipidemia Father      Hypertension Father      Eczema Brother      Allergies Brother      Osteoarthritis Brother      Allergies Brother      Anemia Maternal Grandmother      Cancer Maternal Grandfather      Diabetes Paternal Grandmother      Early death Paternal Grandfather      Colon cancer Neg Hx      Breast cancer Neg Hx      Ovarian cancer Neg Hx      Heart attack Neg Hx      Stroke Neg Hx      Osteoporosis Neg Hx         Physical Exam:      PHYSICAL EXAM:  /81   Ht 5' 6" (1.676 m)   Wt 129.9 kg (286 lb 6 oz)   LMP 04/08/2025 (Exact Date)   BMI 46.22 kg/m²   Body mass index is 46.22 kg/m².     APPEARANCE: Well nourished, well developed, in no acute distress.      Assessment/Plan:     Abnormal uterine bleeding (AUB)  -     norelgestromin-ethinyl estradiol (XULANE) 150-35 mcg/24 hr; Apply 1 patch every week x 3 weeks, off x 1 week  Dispense: 12 patch; Refill: 4  -     diazePAM (VALIUM) 5 MG tablet; Take 1 tablet (5 mg total) by mouth once. Take 30 minutes prior to procedure for 1 dose  Dispense: 1 tablet; Refill: 0    PCOS (polycystic ovarian " syndrome)  -     norelgestromin-ethinyl estradiol (XULANE) 150-35 mcg/24 hr; Apply 1 patch every week x 3 weeks, off x 1 week  Dispense: 12 patch; Refill: 4  -     diazePAM (VALIUM) 5 MG tablet; Take 1 tablet (5 mg total) by mouth once. Take 30 minutes prior to procedure for 1 dose  Dispense: 1 tablet; Refill: 0    Vulvovaginal candidiasis  -     fluconazole (DIFLUCAN) 150 MG Tab; Take 1 tablet (150 mg total) by mouth Every 3 (three) days. for 2 doses  Dispense: 2 tablet; Refill: 0        PLAN:    Counseled on diagnosis of PCOS due to meeting 2 of 3 [place] criteria (oligomenorrhea and hirsutism).   Discussed diagnosis and associated complications (abnormal bleeding, metabolic syndrome, infertility, endometrial hyperplasia/cancer).   Discussed treatment is aimed at managing the symptoms that are concerning to her and specifically to help regulate abnormal uterine bleeding and mitigate risk of endometrial hyperplasia and cancer.   EMB recommended for further evaluation of AUB and heterogeneous endometrium as noted on recent pelvic ultrasound.   Discussed importance of follow up with PCP regarding recent T2DM diagnosis. Discussed importance of glycemic control prior to pregnancy.   Will re-initiate patches to help regulate cycles and for pregnancy prevention in the meantime ([facility] 2 w/ h/o T2DM, nonvascular disease, non-insulin dependent)    Face to Face time with patient: 40    45 minutes of total time spent on the encounter, which includes face to face time and non-face to face time preparing to see the patient (eg, review of tests), Obtaining and/or reviewing separately obtained history, Documenting clinical information in the electronic or other health record, Independently interpreting results (not separately reported) and communicating results to the patient/family/caregiver, or Care coordination (not separately reported).       Follow up for EMB.                 [1]   Current Outpatient Medications:      albuterol (PROVENTIL/VENTOLIN HFA) 90 mcg/actuation inhaler, Inhale 2 puffs into the lungs every 6 (six) hours as needed for Wheezing or Shortness of Breath. Rescue, Disp: 18 g, Rfl: 2    ammonium lactate 12 % Crea, Apply twice daily to affected parts both feet as needed., Disp: 140 g, Rfl: 11    amoxicillin (AMOXIL) 875 MG tablet, Take 875 mg by mouth 2 (two) times daily., Disp: , Rfl:     azelastine (ASTELIN) 137 mcg (0.1 %) nasal spray, 1 spray (137 mcg total) by Nasal route 2 (two) times daily., Disp: 30 mL, Rfl: 11    benzonatate (TESSALON) 100 MG capsule, Take 1 capsule (100 mg total) by mouth 3 (three) times daily as needed for Cough., Disp: 30 capsule, Rfl: 0    clotrimazole-betamethasone 1-0.05% (LOTRISONE) cream, Apply topically 2 (two) times daily. for 14 days, Disp: 45 g, Rfl: 0    fexofenadine (ALLEGRA) 180 MG tablet, Take 1 tablet (180 mg total) by mouth once daily., Disp: 90 tablet, Rfl: 3    fluticasone propionate (FLONASE) 50 mcg/actuation nasal spray, 2 sprays (100 mcg total) by Each Nostril route daily as needed., Disp: 15.8 mL, Rfl: 0    ketoconazole (NIZORAL) 2 % shampoo, Apply 5 to 10 mL to wet scalp, lather, leave on 3 to 5 minutes, and rinse thoroughly., Disp: 120 mL, Rfl: 5    medroxyPROGESTERone (PROVERA) 10 MG tablet, Take 2 tablets (20 mg total) by mouth 3 (three) times daily. Every 1-2 mths if no menses for 7 days, Disp: 42 tablet, Rfl: 0    montelukast (SINGULAIR) 10 mg tablet, Take 1 tablet (10 mg total) by mouth every evening., Disp: 90 tablet, Rfl: 1    mv-min/iron/folic/calcium/vitK (WOMEN'S MULTIVITAMIN ORAL), Take by mouth., Disp: , Rfl:     pantoprazole (PROTONIX) 40 MG tablet, Take 1 tablet (40 mg total) by mouth once daily for 30 days, THEN 1 tablet (40 mg total) daily as needed (reflux)., Disp: 30 tablet, Rfl: 1    predniSONE (DELTASONE) 20 MG tablet, Take 1 tablet (20 mg total) by mouth once daily. for 5 days, Disp: 5 tablet, Rfl: 0    sodium chloride (SALINE NASAL) 0.65 %  nasal spray, 1 spray by Nasal route once daily., Disp: 60 mL, Rfl: 0    diazePAM (VALIUM) 5 MG tablet, Take 1 tablet (5 mg total) by mouth once. Take 30 minutes prior to procedure for 1 dose, Disp: 1 tablet, Rfl: 0    fluconazole (DIFLUCAN) 150 MG Tab, Take 1 tablet (150 mg total) by mouth Every 3 (three) days. for 2 doses, Disp: 2 tablet, Rfl: 0    norelgestromin-ethinyl estradiol (XULANE) 150-35 mcg/24 hr, Apply 1 patch every week x 3 weeks, off x 1 week, Disp: 12 patch, Rfl: 4  [2]   Social History  Tobacco Use    Smoking status: Former     Current packs/day: 0.50     Average packs/day: 0.5 packs/day for 1 year (0.5 ttl pk-yrs)     Types: Cigarettes     Passive exposure: Current    Smokeless tobacco: Never   Substance Use Topics    Alcohol use: Not Currently    Drug use: Never

## 2025-04-25 NOTE — Clinical Note
Shaina Jarrett was recently diagnosed with diabetes (A1c 9.9) and is interested in re-establishing care with you. I attempted to place a referral but could not find you as a provider. Are you still seeing patients, would you be able to meet with Shaina.  Thanks,  Cathie

## 2025-04-28 DIAGNOSIS — E11.65 TYPE 2 DIABETES MELLITUS WITH HYPERGLYCEMIA, WITHOUT LONG-TERM CURRENT USE OF INSULIN: Primary | ICD-10-CM

## 2025-04-29 ENCOUNTER — RESULTS FOLLOW-UP (OUTPATIENT)
Dept: ALLERGY | Facility: CLINIC | Age: 40
End: 2025-04-29

## 2025-04-29 ENCOUNTER — PATIENT MESSAGE (OUTPATIENT)
Dept: OBSTETRICS AND GYNECOLOGY | Facility: CLINIC | Age: 40
End: 2025-04-29
Payer: COMMERCIAL

## 2025-04-29 ENCOUNTER — PATIENT MESSAGE (OUTPATIENT)
Dept: INTERNAL MEDICINE | Facility: CLINIC | Age: 40
End: 2025-04-29
Payer: COMMERCIAL

## 2025-04-29 DIAGNOSIS — N94.89 LABIAL SWELLING: ICD-10-CM

## 2025-04-29 DIAGNOSIS — N76.0 ACUTE VAGINITIS: ICD-10-CM

## 2025-04-29 LAB
W ALLERGY INTERPRETATION: ABNORMAL
W ALTERNARIA ALTERNATA, CLASS: ABNORMAL
W ALTERNARIA ALTERNATA, IGE: <0.1 KU/L
W ASPERGILLUS FUMIGATUS, CLASS: ABNORMAL
W ASPERGILLUS FUMIGATUS, IGE: 0.38 KU/L
W BERMUDA GRASS, CLASS: ABNORMAL
W BERMUDA GRASS, IGE: <0.1 KU/L
W CAT DANDER, CLASS: ABNORMAL
W CAT DANDER, IGE: <0.1 KU/L
W CLADOSPORIUM HERBARUM, CLASS: ABNORMAL
W CLADOSPORIUM HERBARUM, IGE: 0.3 KU/L
W COCKROACH, GERMAN, CLASS: ABNORMAL
W COCKROACH, GERMAN, IGE: <0.1 KU/L
W COMMON PIGWEED, CLASS: ABNORMAL
W COMMON PIGWEED, IGE: <0.1 KU/L
W COMMON RAGWEED (SHORT), CLASS: ABNORMAL
W COMMON RAGWEED (SHORT), IGE: 0.21 KU/L
W COMMON SILVER BIRCH, CLASS: ABNORMAL
W COMMON SILVER BIRCH, IGE: 0.14 KU/L
W DERMATOPHAGOIDES FARINAE CLASS: ABNORMAL
W DERMATOPHAGOIDES FARINAE, IGE: 0.76 KU/L
W DERMATOPHAGOIDES PTERONYSSINUS CLASS: ABNORMAL
W DERMATOPHAGOIDES PTERONYSSINUS, IGE: 0.61 KU/L
W DOG DANDER, CLASS: ABNORMAL
W DOG DANDER, IGE: 0.11 KU/L
W ELM, CLASS: ABNORMAL
W ELM, IGE: 0.46 KU/L
W IGE: 1309 IU/ML
W MAPLE (BOX ELDER), CLASS: ABNORMAL
W MAPLE (BOX ELDER), IGE: <0.1 KU/L
W MOUNTAIN JUNIPER CLASS: ABNORMAL
W MOUNTAIN JUNIPER, IGE: <0.1 KU/L
W MOUSE URINE PROTEINS CLASS: ABNORMAL
W MOUSE URINE PROTEINS, IGE: <0.1 KU/L
W MULBERRY, CLASS: ABNORMAL
W MULBERRY, IGE: <0.1 KU/L
W OAK, CLASS: ABNORMAL
W OAK, IGE: 0.23 KU/L
W PECAN, HICKORY, CLASS: ABNORMAL
W PECAN, HICKORY, IGE: 0.15 KU/L
W PENICILLIUM CHRYSOGENUM, CLASS: ABNORMAL
W PENICILLIUM CHRYSOGENUM, IGE: 0.56 KU/L
W ROUGH MARSHELDER, CLASS: ABNORMAL
W ROUGH MARSHELDER, IGE: <0.1 KU/L
W TIMOTHY GRASS, CLASS: ABNORMAL
W TIMOTHY GRASS, IGE: <0.1 KU/L
W WALNUT TREE, CLASS: ABNORMAL
W WALNUT TREE, IGE: 0.17 KU/L

## 2025-04-29 RX ORDER — CLOTRIMAZOLE AND BETAMETHASONE DIPROPIONATE 10; .64 MG/G; MG/G
CREAM TOPICAL 2 TIMES DAILY
Qty: 45 G | Refills: 0 | Status: CANCELLED | OUTPATIENT
Start: 2025-04-29 | End: 2025-05-13

## 2025-04-30 ENCOUNTER — OFFICE VISIT (OUTPATIENT)
Dept: OBSTETRICS AND GYNECOLOGY | Facility: CLINIC | Age: 40
End: 2025-04-30
Payer: COMMERCIAL

## 2025-04-30 ENCOUNTER — PATIENT MESSAGE (OUTPATIENT)
Dept: INTERNAL MEDICINE | Facility: CLINIC | Age: 40
End: 2025-04-30
Payer: COMMERCIAL

## 2025-04-30 VITALS
HEIGHT: 66 IN | DIASTOLIC BLOOD PRESSURE: 86 MMHG | SYSTOLIC BLOOD PRESSURE: 124 MMHG | WEIGHT: 279.75 LBS | BODY MASS INDEX: 44.96 KG/M2

## 2025-04-30 DIAGNOSIS — B37.31 VULVOVAGINAL CANDIDIASIS: ICD-10-CM

## 2025-04-30 DIAGNOSIS — R39.89 GENITAL SORE: Primary | ICD-10-CM

## 2025-04-30 PROCEDURE — 99213 OFFICE O/P EST LOW 20 MIN: CPT | Mod: S$GLB,,, | Performed by: NURSE PRACTITIONER

## 2025-04-30 PROCEDURE — 3046F HEMOGLOBIN A1C LEVEL >9.0%: CPT | Mod: CPTII,S$GLB,, | Performed by: NURSE PRACTITIONER

## 2025-04-30 PROCEDURE — 1159F MED LIST DOCD IN RCRD: CPT | Mod: CPTII,S$GLB,, | Performed by: NURSE PRACTITIONER

## 2025-04-30 PROCEDURE — 87529 HSV DNA AMP PROBE: CPT | Mod: 59 | Performed by: NURSE PRACTITIONER

## 2025-04-30 PROCEDURE — 87102 FUNGUS ISOLATION CULTURE: CPT | Performed by: NURSE PRACTITIONER

## 2025-04-30 PROCEDURE — 3008F BODY MASS INDEX DOCD: CPT | Mod: CPTII,S$GLB,, | Performed by: NURSE PRACTITIONER

## 2025-04-30 PROCEDURE — 99999 PR PBB SHADOW E&M-EST. PATIENT-LVL III: CPT | Mod: PBBFAC,,, | Performed by: NURSE PRACTITIONER

## 2025-04-30 PROCEDURE — 3079F DIAST BP 80-89 MM HG: CPT | Mod: CPTII,S$GLB,, | Performed by: NURSE PRACTITIONER

## 2025-04-30 PROCEDURE — 3074F SYST BP LT 130 MM HG: CPT | Mod: CPTII,S$GLB,, | Performed by: NURSE PRACTITIONER

## 2025-04-30 RX ORDER — FLUCONAZOLE 150 MG/1
150 TABLET ORAL
Qty: 3 TABLET | Refills: 0 | Status: SHIPPED | OUTPATIENT
Start: 2025-04-30 | End: 2025-05-07

## 2025-04-30 RX ORDER — VALACYCLOVIR HYDROCHLORIDE 1 G/1
1000 TABLET, FILM COATED ORAL EVERY 12 HOURS
Qty: 20 TABLET | Refills: 0 | Status: SHIPPED | OUTPATIENT
Start: 2025-04-30 | End: 2025-05-10

## 2025-04-30 NOTE — TELEPHONE ENCOUNTER
Placed patient on my schedule tomorrow for a virtual at 11 am to discuss labs and potential treatment. She does not need to repeat the labs fasting, she meets criteria for diabetes even after eating. Let me know if that doesn't work for her so we can find another time. Thank you!

## 2025-04-30 NOTE — PROGRESS NOTES
"Chief Complaint: Vaginitis     HPI:      Shaina is a 39 y.o.  who presents today for vulvar irritation/burning/itching. She took 3 doses of Fluconazole and has been using the Lotrisone cream twice a day.       Component      Latest Ref Rng 2025   Bacterial vaginosis DNA      Not Detected, Invalid  Not Detected    Candida sp      Not Detected, Invalid  Detected !    Candida glabrata/krusei      Not Detected, Invalid  Not Detected    Trichomonas vaginalis      Not Detected, Invalid  Not Detected       Patient's last menstrual period was 2025 (exact date).     Shaina is currently sexually active with a single male partner. She is currently using contraceptive patches for contraception.     Previous Pap: NILM, HPV negative (3/28/2024)    Past Medical History:   Diagnosis Date    Eczema     Prediabetes     Recurrent upper respiratory infection (URI)     Smoker      Current Medications[1]   Review of patient's allergies indicates:   Allergen Reactions    Milk containing products (dairy) Diarrhea and Nausea And Vomiting     Past Surgical History:   Procedure Laterality Date    NO PAST SURGERIES       Social History[2]  Family History   Problem Relation Name Age of Onset    Allergies Mother      Hypertension Mother      Diabetes Father      Hyperlipidemia Father      Hypertension Father      Eczema Brother      Allergies Brother      Osteoarthritis Brother      Allergies Brother      Anemia Maternal Grandmother      Cancer Maternal Grandfather      Diabetes Paternal Grandmother      Early death Paternal Grandfather      Colon cancer Neg Hx      Breast cancer Neg Hx      Ovarian cancer Neg Hx      Heart attack Neg Hx      Stroke Neg Hx      Osteoporosis Neg Hx         Physical Exam:      PHYSICAL EXAM:  /86   Ht 5' 6" (1.676 m)   Wt 126.9 kg (279 lb 12.2 oz)   LMP 2025 (Exact Date)   BMI 45.16 kg/m²   Body mass index is 45.16 kg/m².     APPEARANCE: Well nourished, well developed, in no acute " distress.  PELVIC:  External genitalia - multiple 0.25 cm ulcerated lesions noted on L labia minora and surrounding rectum. Urethra within normal limits. Fungal culture collected. Speculum/bimanual exam deferred.     Verbal consent obtained     Chaperoned by: DORINDA Lu     Assessment/Plan:     Genital sore  -     fluconazole (DIFLUCAN) 150 MG Tab; Take 1 tablet (150 mg total) by mouth Every 3 (three) days. for 3 doses  Dispense: 3 tablet; Refill: 0  -     valACYclovir (VALTREX) 1000 MG tablet; Take 1 tablet (1,000 mg total) by mouth every 12 (twelve) hours. Start: ASAP w/in 72h of sx onset for 10 days  Dispense: 20 tablet; Refill: 0  -     Fungus culture  -     HSV by Rapid PCR [facility]; Body Fluid; Genital    Vulvovaginal candidiasis  -     fluconazole (DIFLUCAN) 150 MG Tab; Take 1 tablet (150 mg total) by mouth Every 3 (three) days. for 3 doses  Dispense: 3 tablet; Refill: 0  -     valACYclovir (VALTREX) 1000 MG tablet; Take 1 tablet (1,000 mg total) by mouth every 12 (twelve) hours. Start: ASAP w/in 72h of sx onset for 10 days  Dispense: 20 tablet; Refill: 0  -     Fungus culture  -     HSV by Rapid PCR [facility]; Body Fluid; Genital        PLAN:    HSV culture collected from genital lesions. Fungal culture collected. Will treat empirically for initial genital HSV outbreak with Valtrex 1 gm BID x 7 days as well as persistent VVC with Fluconazole 150 mg every 3 days for a total of 3 doses.     Follow up if symptoms worsen or fail to improve.                 [1]   Current Outpatient Medications:     albuterol (PROVENTIL/VENTOLIN HFA) 90 mcg/actuation inhaler, Inhale 2 puffs into the lungs every 6 (six) hours as needed for Wheezing or Shortness of Breath. Rescue, Disp: 18 g, Rfl: 2    ammonium lactate 12 % Crea, Apply twice daily to affected parts both feet as needed., Disp: 140 g, Rfl: 11    amoxicillin (AMOXIL) 875 MG tablet, Take 875 mg by mouth 2 (two) times daily., Disp: , Rfl:     azelastine (ASTELIN) 137 mcg  (0.1 %) nasal spray, 1 spray (137 mcg total) by Nasal route 2 (two) times daily., Disp: 30 mL, Rfl: 11    benzonatate (TESSALON) 100 MG capsule, Take 1 capsule (100 mg total) by mouth 3 (three) times daily as needed for Cough., Disp: 30 capsule, Rfl: 0    clotrimazole-betamethasone 1-0.05% (LOTRISONE) cream, Apply topically 2 (two) times daily. for 14 days, Disp: 45 g, Rfl: 0    fexofenadine (ALLEGRA) 180 MG tablet, Take 1 tablet (180 mg total) by mouth once daily., Disp: 90 tablet, Rfl: 3    fluticasone propionate (FLONASE) 50 mcg/actuation nasal spray, 2 sprays (100 mcg total) by Each Nostril route daily as needed., Disp: 15.8 mL, Rfl: 0    ketoconazole (NIZORAL) 2 % shampoo, Apply 5 to 10 mL to wet scalp, lather, leave on 3 to 5 minutes, and rinse thoroughly., Disp: 120 mL, Rfl: 5    montelukast (SINGULAIR) 10 mg tablet, Take 1 tablet (10 mg total) by mouth every evening., Disp: 90 tablet, Rfl: 1    mv-min/iron/folic/calcium/vitK (WOMEN'S MULTIVITAMIN ORAL), Take by mouth., Disp: , Rfl:     norelgestromin-ethinyl estradiol (XULANE) 150-35 mcg/24 hr, Apply 1 patch every week x 3 weeks, off x 1 week, Disp: 12 patch, Rfl: 4    pantoprazole (PROTONIX) 40 MG tablet, Take 1 tablet (40 mg total) by mouth once daily for 30 days, THEN 1 tablet (40 mg total) daily as needed (reflux)., Disp: 30 tablet, Rfl: 1    sodium chloride (SALINE NASAL) 0.65 % nasal spray, 1 spray by Nasal route once daily., Disp: 60 mL, Rfl: 0    diazePAM (VALIUM) 5 MG tablet, Take 1 tablet (5 mg total) by mouth once. Take 30 minutes prior to procedure for 1 dose, Disp: 1 tablet, Rfl: 0    fluconazole (DIFLUCAN) 150 MG Tab, Take 1 tablet (150 mg total) by mouth Every 3 (three) days. for 3 doses, Disp: 3 tablet, Rfl: 0    medroxyPROGESTERone (PROVERA) 10 MG tablet, Take 2 tablets (20 mg total) by mouth 3 (three) times daily. Every 1-2 mths if no menses for 7 days, Disp: 42 tablet, Rfl: 0    valACYclovir (VALTREX) 1000 MG tablet, Take 1 tablet (1,000  mg total) by mouth every 12 (twelve) hours. Start: ASAP w/in 72h of sx onset for 10 days, Disp: 20 tablet, Rfl: 0  [2]   Social History  Tobacco Use    Smoking status: Former     Current packs/day: 0.50     Average packs/day: 0.5 packs/day for 1 year (0.5 ttl pk-yrs)     Types: Cigarettes     Passive exposure: Current    Smokeless tobacco: Never   Substance Use Topics    Alcohol use: Not Currently    Drug use: Never

## 2025-05-01 ENCOUNTER — RESULTS FOLLOW-UP (OUTPATIENT)
Dept: INTERNAL MEDICINE | Facility: CLINIC | Age: 40
End: 2025-05-01

## 2025-05-01 ENCOUNTER — PATIENT MESSAGE (OUTPATIENT)
Dept: INTERNAL MEDICINE | Facility: CLINIC | Age: 40
End: 2025-05-01

## 2025-05-01 ENCOUNTER — OFFICE VISIT (OUTPATIENT)
Dept: INTERNAL MEDICINE | Facility: CLINIC | Age: 40
End: 2025-05-01
Payer: COMMERCIAL

## 2025-05-01 ENCOUNTER — PATIENT MESSAGE (OUTPATIENT)
Dept: FAMILY MEDICINE | Facility: CLINIC | Age: 40
End: 2025-05-01
Payer: COMMERCIAL

## 2025-05-01 DIAGNOSIS — R79.89 ELEVATED LFTS: ICD-10-CM

## 2025-05-01 DIAGNOSIS — E55.9 VITAMIN D DEFICIENCY: ICD-10-CM

## 2025-05-01 DIAGNOSIS — E11.65 TYPE 2 DIABETES MELLITUS WITH HYPERGLYCEMIA, WITHOUT LONG-TERM CURRENT USE OF INSULIN: Primary | ICD-10-CM

## 2025-05-01 PROCEDURE — G2211 COMPLEX E/M VISIT ADD ON: HCPCS | Mod: 95,,, | Performed by: STUDENT IN AN ORGANIZED HEALTH CARE EDUCATION/TRAINING PROGRAM

## 2025-05-01 PROCEDURE — 98006 SYNCH AUDIO-VIDEO EST MOD 30: CPT | Mod: 95,,, | Performed by: STUDENT IN AN ORGANIZED HEALTH CARE EDUCATION/TRAINING PROGRAM

## 2025-05-01 PROCEDURE — 3046F HEMOGLOBIN A1C LEVEL >9.0%: CPT | Mod: CPTII,95,, | Performed by: STUDENT IN AN ORGANIZED HEALTH CARE EDUCATION/TRAINING PROGRAM

## 2025-05-01 RX ORDER — TIRZEPATIDE 2.5 MG/.5ML
2.5 INJECTION, SOLUTION SUBCUTANEOUS
Qty: 2 ML | Refills: 0 | Status: SHIPPED | OUTPATIENT
Start: 2025-05-01 | End: 2025-05-31

## 2025-05-01 NOTE — PROGRESS NOTES
The patient's location is:  [place]  The chief complaint leading to consultation is:  Type 2 diabetes mellitus with hyperglycemia, without long-term current use of insulin [E11.65]    Visit type: audiovisual    Time spent in discussion with the patient: 21 minutes  32 minutes of total time spent on the encounter. This includes time spent in discussion with the patient and time preparing for the patient appointment: review of tests, obtaining and/or reviewing separately obtained history, documenting clinical information in the electronic or other health record, independently interpreting results (not separately reported), and communicating results to the patient/family/caregiver or care coordination (not separately reported).     Each patient to whom he or she provides medical services by telemedicine is: (1) informed of the relationship between the physician and patient and the respective role of any other health care provider with respect to management of the patient; and (2) notified that he or she may decline to receive medical services by telemedicine and may withdraw from such care at any time.      Subjective:   Shaina Mas is a 39 y.o. female.    Patient is established with me, here today for the following:    History of Present Illness      DIABETES MANAGEMENT:  A1C increased from 6.3 to 9.9, and glucose was 275. She has implemented dietary modifications, including complete elimination of bread and sugar. Current dietary routine consists of yogurt with berries and yovani seeds for breakfast, salad with protein source for lunch, green apple for snack, and small portion of protein-focused dinner. She drinks 2-3 cups of cinnamon tea with lemon daily. She reports weight loss of 7 kg in the past week.    LABS:  Triglycerides and liver enzymes were elevated. Vitamin D was very low; she is taking Vitamin D 2000 IU daily.       T2DM -   Never previously treated with medication for diabetes.  Due for micro albumin  creatinine ratio.   Has appointment with diabetic educator next week.   Due for foot exam .  Due for eye exam .   Lab Results   Component Value Date    HGBA1C 9.9 (H) 04/22/2025    HGBA1C 6.3 (H) 01/31/2024    HGBA1C 6.2 (H) 12/29/2022     Lab Results   Component Value Date    MICALBCREAT 11.0 04/22/2025      Vitamin D deficiency -  Currently taking vitamin D3 2000 IU daily supplementation.  Lab Results   Component Value Date    OYAZYAHS13UV 13 (L) 04/22/2025    NQFXOGBA25JO 12 (L) 01/31/2024     Hypertriglyceridemia -   Lab Results   Component Value Date    CHOL 208 (H) 04/22/2025     Lab Results   Component Value Date    TRIG 322 (H) 04/22/2025     Lab Results   Component Value Date    LDLCALC 104.6 04/22/2025     Lab Results   Component Value Date    HDL 39 (L) 04/22/2025     Elevated LFT's -   Lab Results   Component Value Date    AST 26 04/22/2025    ALT 54 (H) 04/22/2025    ALKPHOS 93 04/22/2025     Lab Results   Component Value Date    FERRITIN 170.0 04/22/2025     Hepatitis B Surface Ag   Date Value Ref Range Status   01/31/2024 Non-reactive Non-reactive Final       Current Outpatient Medications   Medication Instructions    albuterol (PROVENTIL/VENTOLIN HFA) 90 mcg/actuation inhaler 2 puffs, Inhalation, Every 6 hours PRN, Rescue    ammonium lactate 12 % Crea Apply twice daily to affected parts both feet as needed.    amoxicillin (AMOXIL) 875 mg, 2 times daily    azelastine (ASTELIN) 137 mcg, Nasal, 2 times daily    benzonatate (TESSALON) 100 mg, Oral, 3 times daily PRN    clotrimazole-betamethasone 1-0.05% (LOTRISONE) cream Topical (Top), 2 times daily    diazePAM (VALIUM) 5 mg, Oral, Once, Take 30 minutes prior to procedure    fexofenadine (ALLEGRA) 180 mg, Oral, Daily    fluconazole (DIFLUCAN) 150 mg, Oral, Every 3 days    fluticasone propionate (FLONASE) 100 mcg, Each Nostril, Daily PRN    ketoconazole (NIZORAL) 2 % shampoo Apply 5 to 10 mL to wet scalp, lather, leave on 3 to 5 minutes, and rinse  thoroughly.    medroxyPROGESTERone (PROVERA) 20 mg, Oral, 3 times daily, Every 1-2 mths if no menses    montelukast (SINGULAIR) 10 mg, Oral, Nightly    MOUNJARO 2.5 mg, Subcutaneous, Every 7 days    mv-min/iron/folic/calcium/vitK (WOMEN'S MULTIVITAMIN ORAL) Take by mouth.    norelgestromin-ethinyl estradiol (XULANE) 150-35 mcg/24 hr Apply 1 patch every week x 3 weeks, off x 1 week    pantoprazole (PROTONIX) 40 MG tablet Take 1 tablet (40 mg total) by mouth once daily for 30 days, THEN 1 tablet (40 mg total) daily as needed (reflux).    sodium chloride (SALINE NASAL) 0.65 % nasal spray 1 spray, Nasal, Daily    valACYclovir (VALTREX) 1,000 mg, Oral, Every 12 hours, Start: ASAP w/in 72h of sx onset       Objective:   There were no vitals filed for this visit.     Physical Exam  Constitutional:       General: She is not in acute distress.     Appearance: Normal appearance. She is not ill-appearing or diaphoretic.   Neurological:      Mental Status: She is alert.   Psychiatric:         Attention and Perception: Attention normal.         Mood and Affect: Mood and affect normal.         Speech: Speech normal.           Assessment/Plan:   Type 2 diabetes mellitus with hyperglycemia, without long-term current use of insulin  -     tirzepatide (MOUNJARO) 2.5 mg/0.5 mL PnIj; Inject 2.5 mg into the skin every 7 days.  Dispense: 2 mL; Refill: 0  -     Ambulatory referral/consult to Optometry; Future; Expected date: 05/08/2025  -     Hemoglobin A1C; Future; Expected date: 07/21/2025  -     Comprehensive Metabolic Panel; Future; Expected date: 07/21/2025    Elevated LFTs  -     Comprehensive Metabolic Panel; Future; Expected date: 07/21/2025    Vitamin D deficiency        Assessment & Plan      IMPRESSION:  - A1C increased significantly from 6.3 to 9.9, with glucose at 253, indicating diabetes.  - Started Mounjaro (tirzepatide) 2.5 mg weekly injection as first-line treatment due to its efficacy in glucose control and weight  reduction, pending insurance approval. If Mounjaro is denied, will attempt Ozempic as alternative.   - Plan to titrate Mounjaro dosage monthly, starting at 2.5 mg and increasing as tolerated.  - Anticipate potential side effects of Mounjaro, including nausea and GI issues.  - Monitoring triglycerides and liver enzymes, which are expected to improve with diabetes management.  - Addressed vitamin D deficiency with current supplementation of 2000 IU daily.    PLAN SUMMARY:  - Start Mounjaro (tirzepatide) 2.5 mg weekly injection, pending insurance approval  - Arrange nurse appointment for medication injection technique teaching  - Recheck liver enzyme levels in 3 months  - Consider Ozempic as alternative if Mounjaro is denied  - Plan to titrate Mounjaro dosage monthly, starting at 2.5 mg  - Continue current Vitamin D supplementation (2000 IU daily)  - Follow up in 3 months  - Contact office after 3 weeks to report tolerability for dose increase    TYPE 2 DIABETES MELLITUS:  - Monitored the patient's A1C which increased from 6.3 to 9.9, with glucose level at 253.  - Shaina has lost 7 kg since last week.  - Started Mounjaro (tirzepatide) 2.5 mg weekly injection as first-line treatment due to its efficacy in glucose control and weight reduction, pending insurance approval.  - Will attempt Ozempic as alternative if Mounjaro is denied.  - Plan to titrate Mounjaro dosage monthly, starting at 2.5 mg and increasing as tolerated.  - Anticipated potential side effects include nausea and GI issues.  - Advised patient to contact office after 3 weeks to report tolerability for dose increase.    VITAMIN D DEFICIENCY:  - Shaina currently taking 2000 IU of Vitamin D daily.  - Advised to continue supplementation at current dosage.    HYPERGLYCERIDEMIA:  - Evaluated triglyceride levels which are high.  - Anticipated that dietary changes for diabetes management will likely improve triglyceride levels.    ELEVATED LIVER ENZYMES:  - Evaluated  liver enzyme levels showing minor elevation, potentially indicating fatty liver (MASLD).  - Ordered recheck of liver enzyme levels in 3 months.    FOLLOW-UP:  - Follow up in 3 months.           Bernadette Reich MD  05/01/2025

## 2025-05-03 LAB
HSV1 DNA SPEC QL NAA+PROBE: POSITIVE
HSV2 DNA SPEC QL NAA+PROBE: NEGATIVE
SPECIMEN SOURCE: ABNORMAL

## 2025-05-05 ENCOUNTER — RESULTS FOLLOW-UP (OUTPATIENT)
Dept: OBSTETRICS AND GYNECOLOGY | Facility: CLINIC | Age: 40
End: 2025-05-05
Payer: COMMERCIAL

## 2025-05-05 DIAGNOSIS — A60.04 HERPES SIMPLEX VULVOVAGINITIS: Primary | ICD-10-CM

## 2025-05-06 RX ORDER — VALACYCLOVIR HYDROCHLORIDE 500 MG/1
500 TABLET, FILM COATED ORAL DAILY
Qty: 90 TABLET | Refills: 3 | Status: SHIPPED | OUTPATIENT
Start: 2025-05-06 | End: 2026-05-06

## 2025-05-23 ENCOUNTER — PATIENT MESSAGE (OUTPATIENT)
Dept: DIABETES | Facility: CLINIC | Age: 40
End: 2025-05-23
Payer: COMMERCIAL

## 2025-05-23 ENCOUNTER — PROCEDURE VISIT (OUTPATIENT)
Dept: OBSTETRICS AND GYNECOLOGY | Facility: CLINIC | Age: 40
End: 2025-05-23
Payer: COMMERCIAL

## 2025-05-23 VITALS
HEIGHT: 66 IN | BODY MASS INDEX: 45 KG/M2 | DIASTOLIC BLOOD PRESSURE: 78 MMHG | WEIGHT: 280 LBS | SYSTOLIC BLOOD PRESSURE: 118 MMHG

## 2025-05-23 DIAGNOSIS — F32.A ANXIETY AND DEPRESSION: ICD-10-CM

## 2025-05-23 DIAGNOSIS — F41.9 ANXIETY AND DEPRESSION: ICD-10-CM

## 2025-05-23 DIAGNOSIS — Z31.69 ENCOUNTER FOR PRECONCEPTION CONSULTATION: ICD-10-CM

## 2025-05-23 DIAGNOSIS — E11.9 TYPE 2 DIABETES MELLITUS WITHOUT COMPLICATION, WITHOUT LONG-TERM CURRENT USE OF INSULIN: ICD-10-CM

## 2025-05-23 DIAGNOSIS — N93.9 ABNORMAL UTERINE BLEEDING: Primary | ICD-10-CM

## 2025-05-23 DIAGNOSIS — E28.2 PCOS (POLYCYSTIC OVARIAN SYNDROME): ICD-10-CM

## 2025-05-23 LAB
B-HCG UR QL: NEGATIVE
CTP QC/QA: YES

## 2025-05-23 RX ORDER — NORELGESTROMIN AND ETHINYL ESTRADIOL 150; 35 UG/D; UG/D
PATCH TRANSDERMAL
Qty: 12 PATCH | Refills: 4 | Status: SHIPPED | OUTPATIENT
Start: 2025-05-23

## 2025-05-23 NOTE — Clinical Note
Hi there - I am sorry to send you a message again but Shaina was hoping to r/s her appt with you but she is having trouble doing that through the portal and also could not find you as a provider to send a message to. Would you or your staff mind reaching out to her? Thanks,  Cathie

## 2025-05-23 NOTE — PROCEDURES
"  Chief Complaint: EMB     Subjective:      Shaina Mas is a 39 y.o.  who presents today for endometrial biopsy.  Patient's last menstrual period was 2025 (exact date).    The risks and benefits of endometrial biopsy were reviewed.  All of Ms. Mas's questions were answered. She voiced her understanding and agreed to proceed with endometrial biopsy.      Objective:      UPT is negative    Vitals:    25 1509   BP: 118/78   Weight: 127 kg (279 lb 15.8 oz)   Height: 5' 6" (1.676 m)     GENERAL: Well nourished, well developed, in no acute distress.    Endometrial biopsy    Date/Time: 2025 2:40 PM    Performed by: Cathie Keita NP  Authorized by: Cathie Keita NP    Consent:     Prior to procedure the appropriate consent was completed and verified      Consent given by:  Patient    Patient questions answered: yes      Patient agrees, verbalizes understanding, and wants to proceed: yes      Educational handouts given: no      Instructions and paperwork completed: yes    Indication:     Indications: Other disorder of menstruation and other abnormal bleeding from female genital tract    Pre-procedure:     Pre-procedure timeout performed: yes    Procedure:     Procedure: endometrial biopsy with Pipelle      Cervix cleaned and prepped: yes      A paracervical block was performed: no      An intracervical block was performed: no      The cervix was dilated using cervical dilators: no      Use of single-tooth tenaculum: yes      Uterus sounded: yes      Uterus sound depth (cm):  8    Specimen collected: specimen collected and sent to pathology      Patient tolerated procedure well with no complications: yes    Comments:     Procedure comments:  Pt took Ibuprofen 800 mg and Valium 5 mg prior to appt   Heating pad given to patient   Topical lidocaine used throughout procedure      Assessment/Plan:     Abnormal uterine bleeding  -     XULANE 150-35 mcg/24 hr; Apply 1 patch every week x 3 weeks, " off x 1 week  Dispense: 12 patch; Refill: 4  -     POCT Urine Pregnancy  -     Endometrial biopsy  -     Specimen to Pathology Gynecology and Obstetrics    PCOS (polycystic ovarian syndrome)  -     XULANE 150-35 mcg/24 hr; Apply 1 patch every week x 3 weeks, off x 1 week  Dispense: 12 patch; Refill: 4  -     POCT Urine Pregnancy  -     Endometrial biopsy  -     Specimen to Pathology Gynecology and Obstetrics    Type 2 diabetes mellitus without complication, without long-term current use of insulin  -     Ambulatory referral/consult to Perinatology; Future; Expected date: 05/30/2025    Encounter for preconception consultation  -     Ambulatory referral/consult to Perinatology; Future; Expected date: 05/30/2025    Anxiety and depression  -     Ambulatory referral/consult to Psychiatry; Future; Expected date: 05/30/2025      Final plan and follow up to be based on biopsy results.    The importance of keeping follow-up appointments was discussed.For cramping NSAIDs or Tylenol were recommended.  Advised that she may resume normal activities, including tampon use and intercourse, as soon as she feels ready.   Informed that spotting to mild bleeding is to be expected following endometrial biopsy.  Instructed to call the office for heavy bleeding, significant pain, or a  foul-smelling vaginal discharge.

## 2025-05-26 ENCOUNTER — RESULTS FOLLOW-UP (OUTPATIENT)
Dept: OBSTETRICS AND GYNECOLOGY | Facility: CLINIC | Age: 40
End: 2025-05-26

## 2025-05-30 ENCOUNTER — PATIENT MESSAGE (OUTPATIENT)
Dept: INTERNAL MEDICINE | Facility: CLINIC | Age: 40
End: 2025-05-30
Payer: COMMERCIAL

## 2025-05-30 DIAGNOSIS — E11.65 TYPE 2 DIABETES MELLITUS WITH HYPERGLYCEMIA, WITHOUT LONG-TERM CURRENT USE OF INSULIN: Primary | ICD-10-CM

## 2025-05-30 RX ORDER — TIRZEPATIDE 2.5 MG/.5ML
2.5 INJECTION, SOLUTION SUBCUTANEOUS
Qty: 2 ML | Refills: 0 | OUTPATIENT
Start: 2025-05-30 | End: 2025-06-29

## 2025-05-30 RX ORDER — TIRZEPATIDE 5 MG/.5ML
5 INJECTION, SOLUTION SUBCUTANEOUS
Qty: 2 ML | Refills: 0 | Status: SHIPPED | OUTPATIENT
Start: 2025-05-30 | End: 2025-06-21

## 2025-05-30 NOTE — TELEPHONE ENCOUNTER
No care due was identified.  [facility] Embedded Care Due Messages. Reference number: [id number].   5/30/2025 8:34:16 AM CDT

## 2025-06-02 ENCOUNTER — PATIENT MESSAGE (OUTPATIENT)
Dept: MATERNAL FETAL MEDICINE | Facility: CLINIC | Age: 40
End: 2025-06-02
Payer: COMMERCIAL

## 2025-06-10 ENCOUNTER — CLINICAL SUPPORT (OUTPATIENT)
Dept: DIABETES | Facility: CLINIC | Age: 40
End: 2025-06-10
Payer: COMMERCIAL

## 2025-06-10 DIAGNOSIS — E11.65 TYPE 2 DIABETES MELLITUS WITH HYPERGLYCEMIA, WITHOUT LONG-TERM CURRENT USE OF INSULIN: ICD-10-CM

## 2025-06-10 PROCEDURE — G0108 DIAB MANAGE TRN  PER INDIV: HCPCS | Mod: 95,,, | Performed by: DIETITIAN, REGISTERED

## 2025-06-11 NOTE — PROGRESS NOTES
Diabetes Care Specialist Virtual Visit Note       The patient location is: work in [place]  The chief complaint leading to consultation is: Diabetes  Visit type: audiovisual  Total time spent with patient: 60 min   Each patient to whom he or she provides medical services by telemedicine is:  (1) informed of the relationship between the physician and patient and the respective role of any other health care provider with respect to management of the patient; and (2) notified that he or she may decline to receive medical services by telemedicine and may withdraw from such care at any time.     Diabetes Care Specialist Progress Note  Author: Bambi Hall RD, [facility]  Date: 6/11/2025    Intake    Program Intake  Reason for Diabetes Program Visit:: Initial Diabetes Assessment  Current diabetes risk level:: moderate  In the last month, have you used the ER or been admitted to the hospital: No    Current Diabetes Treatment: DM Injectables  DM Injectables Type/Dose: mounjaro 5mg weekly    Continuous Glucose Monitoring  Patient has CGM: No    Lab Results   Component Value Date    HGBA1C 9.9 (H) 04/22/2025       Lifestyle Coping Support & Clinical    Lifestyle/Coping/Support  Learning Barriers:: None  Culture or Faith beliefs that may impact ability to access healthcare: No  Psychosocial/Coping Skills Assessment Completed: : Yes  Assessment indicates:: Adequate understanding  Area of need?: No         Diabetes Self-Management Skills Assessment    Medication Skills Assessment  Patient is able to identify current diabetes medications, dosages, and appropriate timing of medications.: yes  Patient reports problems or concerns with current medication regimen.: no  Patient is  aware that some diabetes medications can cause low blood sugar?: Yes  Medication Skills Assessment Completed:: Yes  Assessment indicates:: Adequate understanding  Area of need?: No    Diabetes Disease Process/Treatment Options  Diabetes Type?: Type  II  When were you diagnosed?: prediabetes in the past, recently dx with T2DM  If previous diabetes education, when/where:: last seen about a year ago  What are your goals for this education session?: wants to work toward controlling A1C and eventually try to get pregnant  Is patient aware of what causes diabetes?: Yes  Does patient understand the pathophysiology of diabetes?: No  Diabetes Disease Process/Treatment Options: Skills Assessment Completed: Yes  Assessment indicates:: Adequate understanding  Area of need?: No    Nutrition/Healthy Eating  Meal Plan 24 Hour Recall - Breakfast: plain non-fat Greek yogurt, handful of blueberries, yovani seeds, ~2 T granola  Meal Plan 24 Hour Recall - Lunch: green salad with chicken and quinoa  Meal Plan 24 Hour Recall - Dinner: last night ~1 cup pasta with seafood and vegetables  Meal Plan 24 Hour Recall - Snack: cheese with small amt dried fruit and nuts (8g sugar on packet) OR IQ Bar (protein bar)  Meal Plan 24 Hour Recall - Beverage: water, sparkling water, zero sugar cranberry  Who shops/cooks?: self,   Patient can identify foods that impact blood sugar.: yes  Nutrition/Healthy Eating Skills Assessment Completed:: Yes  Assessment indicates:: Instruction Needed  Area of need?: Yes    Physical Activity/Exercise  Patient's daily activity level:: lightly active  Patient formally exercises outside of work.: yes  Frequency: three times a week (walking for 30-45 min)  Patient can identify forms of physical activity.: yes  Physical Activity/Exercise Skills Assessment Completed: : Yes  Assessment indicates:: Instruction Needed (Since recent T2DM dx, pt has made significant changes to eating habits and started following meal planning guide we previously discussed in prediabetes education. She has specific questions today regarding PCOS and T2DM.)  Area of need?: Yes    Home Blood Glucose Monitoring  Patient states that blood sugar is checked at home daily.: no  Home Blood  Glucose Monitoring Skills Assessment Completed: : Yes  Assessment indicates:: Instruction Needed  Area of need?: Yes    Acute Complications  Acute Complications Skills Assessment Completed: : No  Deffered due to:: Time  Area of need?: Deferred    Chronic Complications  Chronic Complications Skills Assessment Completed: : No  Deferred due to:: Time  Area of need?: Deferred      Assessment Summary and Plan    Based on today's diabetes care assessment, the following areas of need were identified:      Identified Areas of Need      Medication/Current Diabetes Treatment: No   Lifestyle Coping/Support: No   Diabetes Disease Process/Treatment Options: No   Nutrition/Healthy Eating: Yes - Has made great changes to eating habits since recent T2DM dx - significantly cut added sugar, reading labels, focusing on lean protein first at meals and increased whole grains and non-starchy vegetables. She is eating 3 balanced meals daily and 1-2 snacks. She allows herself occasional treats but limits how much and chooses lower sugar versions (example: sometimes craves ice cream - has no added sugar Rebel ice cream in freezer and will limit to 3 tablespoons). Praised excellent changes! She has some specific questions regarding what she can do nutritionally to help with PCOS and managing T2DM. Discussed benefits of antioxidant-rich foods and omega-3 rich foods in PCOS as well as switching dairy to whole fat rather than non-fat, which has been shown to help with reducing androgen production in PCOS. She has cut out most fruit d/t concerns of blood sugar - discussed okay to have fruits which are another source of antioxidants and fiber, reviewed sticking to portion size (whole piece the size of a tennis ball or ~ 1 cup cut fruit). She has also started a couple of supplements. She is taking OTC vitamin D d/t deficiency and taking fidelina and d-chiro inositol. Confirmed fidelina and d-chiro inositol she is taking is 40:1 ratio (evidence based  efficacious ratio for treatment of PCOS) and states has been 3rd party verified.    Physical Activity/Exercise: Yes - see care planning   Home Blood Glucose Monitoring: Yes - see care planning   Acute Complications: Deferred    Chronic Complications: Deferred       Today's interventions were provided through individual discussion, instruction, and written materials were provided.      Patient verbalized understanding of instruction and written materials.  Pt was able to return back demonstration of instructions today. Patient understood key points, needs reinforcement and further instruction.     Diabetes Self-Management Care Plan:    Today's Diabetes Self-Management Care Plan was developed with Shaina's input. Shaina has agreed to work toward the following goal(s) to improve his/her overall diabetes control.      Care Plan: Diabetes Management   Updates made since 6/11/2024 12:00 AM        Problem: Physical Activity and Exercise         Goal: Patient agrees to increase physical activity to a goal of 5 times per week for at least 30 minutes.    Start Date: 6/10/2025   Expected End Date: 7/11/2025   Priority: Medium   Barriers: No Barriers Identified   Note:    6/10/25 - Has not been exercising regularly. Pt and  recently have started walking in the evening at least 3 times weekly. She wants to increase after this week (she is a  - this is the last week of work before summer break). Has a walking pad at home and has enjoyed water aerobics in the past. Plans to alternate days of water aerobics and walking in the morning in addition to continuing evening walks. Reviewed benefits of exercise on reducing insulin resistance and ADA rec's of at least 30 min, 5 days per week.       Task: Discussed role of physical activity on reducing insulin resistance and improvement in overall glycemic control. Completed 6/11/2025        Task: Discussed role of physical activity as it relates to weight loss Completed  6/11/2025        Task: Offered suggestions on how patient could increase their regular physical activity Completed 6/11/2025        Problem: Blood Glucose Self-Monitoring         Goal: Will wear sample CGM sensor for monitoring glucose for up to 2 weeks.    Start Date: 6/10/2025   Expected End Date: 7/11/2025   Priority: High   Barriers: Lack of Supplies   Note:    6/10/25 - Pt is ready and willing to start SMBG. She used to watch and help family members with testing glucose. Does not currently have a meter kit and supplies. She is very motivated to make some behavioral changes. Discussed while her insurance plan does not cover CGM since she is not on insulin, could really benefit from a 2 week sample CGM to help better understand her glucose and give real-time feedback on her behavioral changes. She is interested in trying it. Has a [employer] phone - pt to check bucky compatibility after visit today. Plan for follow-up next week to start sensor. Reviewed BG goal ranges and how improvement in glucose over time will result in better A1C.        Task: Reviewed the importance of self-monitoring blood glucose and keeping logs. Completed 6/11/2025          Follow Up Plan     Follow up in about 8 days (around 6/18/2025) for sample CGM start.    Today's care plan and follow up schedule was discussed with patient.  Shaina verbalized understanding of the care plan, goals, and agrees to follow up plan.        The patient was encouraged to communicate with his/her health care provider/physician and care team regarding his/her condition(s) and treatment.  I provided the patient with my contact information today and encouraged to contact me via phone or [facility] Patient Portal as needed.     Length of Visit   Total Time: 60 Minutes

## 2025-06-16 ENCOUNTER — PATIENT MESSAGE (OUTPATIENT)
Dept: INTERNAL MEDICINE | Facility: CLINIC | Age: 40
End: 2025-06-16
Payer: COMMERCIAL

## 2025-06-16 DIAGNOSIS — E11.65 TYPE 2 DIABETES MELLITUS WITH HYPERGLYCEMIA, WITHOUT LONG-TERM CURRENT USE OF INSULIN: ICD-10-CM

## 2025-06-16 NOTE — TELEPHONE ENCOUNTER
No care due was identified.  [facility] Embedded Care Due Messages. Reference number: [id number].   6/16/2025 2:18:00 PM CDT

## 2025-06-17 ENCOUNTER — OFFICE VISIT (OUTPATIENT)
Dept: INTERNAL MEDICINE | Facility: CLINIC | Age: 40
End: 2025-06-17
Payer: COMMERCIAL

## 2025-06-17 VITALS — WEIGHT: 273.38 LBS | BODY MASS INDEX: 44.12 KG/M2

## 2025-06-17 DIAGNOSIS — E11.65 TYPE 2 DIABETES MELLITUS WITH HYPERGLYCEMIA, WITHOUT LONG-TERM CURRENT USE OF INSULIN: Primary | ICD-10-CM

## 2025-06-17 DIAGNOSIS — R79.89 ELEVATED LFTS: ICD-10-CM

## 2025-06-17 DIAGNOSIS — E28.2 PCOS (POLYCYSTIC OVARIAN SYNDROME): ICD-10-CM

## 2025-06-17 PROCEDURE — 98007 SYNCH AUDIO-VIDEO EST HI 40: CPT | Mod: 95,,, | Performed by: STUDENT IN AN ORGANIZED HEALTH CARE EDUCATION/TRAINING PROGRAM

## 2025-06-17 PROCEDURE — G2211 COMPLEX E/M VISIT ADD ON: HCPCS | Mod: 95,,, | Performed by: STUDENT IN AN ORGANIZED HEALTH CARE EDUCATION/TRAINING PROGRAM

## 2025-06-17 PROCEDURE — 3061F NEG MICROALBUMINURIA REV: CPT | Mod: CPTII,95,, | Performed by: STUDENT IN AN ORGANIZED HEALTH CARE EDUCATION/TRAINING PROGRAM

## 2025-06-17 PROCEDURE — 3066F NEPHROPATHY DOC TX: CPT | Mod: CPTII,95,, | Performed by: STUDENT IN AN ORGANIZED HEALTH CARE EDUCATION/TRAINING PROGRAM

## 2025-06-17 PROCEDURE — 3046F HEMOGLOBIN A1C LEVEL >9.0%: CPT | Mod: CPTII,95,, | Performed by: STUDENT IN AN ORGANIZED HEALTH CARE EDUCATION/TRAINING PROGRAM

## 2025-06-17 NOTE — PROGRESS NOTES
The patient's location is:  [place]  The chief complaint leading to consultation is:  Type 2 diabetes mellitus with hyperglycemia, without long-term current use of insulin [E11.65]    Visit type: audiovisual    Time spent in discussion with the patient: 26 minutes  36 minutes of total time spent on the encounter. This includes time spent in discussion with the patient and time preparing for the patient appointment: review of tests, obtaining and/or reviewing separately obtained history, documenting clinical information in the electronic or other health record, independently interpreting results (not separately reported), and communicating results to the patient/family/caregiver or care coordination (not separately reported).     Each patient to whom he or she provides medical services by telemedicine is: (1) informed of the relationship between the physician and patient and the respective role of any other health care provider with respect to management of the patient; and (2) notified that he or she may decline to receive medical services by telemedicine and may withdraw from such care at any time.      Subjective:   Shaina Mas is a 39 y.o. female.    Patient is established with me, here today for the following:    History of Present Illness      WEIGHT MANAGEMENT:  She has lost a total of 11 kg, with 6 kg loss since starting Mounjaro. Her current weight is 124 kg, down from an initial weight of 133 kg. She reports following a protein diet with significant dietary modifications, including careful label reading to limit added sugar intake to 1 g or less per item. She demonstrates understanding of the difference between added and naturally occurring sugars in foods.    MEDICATIONS:  She takes Mounjaro weekly on Thursdays and has two remaining doses of 5 mg. She reports constipation as a side effect, initially with no bowel movements for 1 week after starting the medication. Current bowel movement frequency is  1-2 times weekly, which she reports as manageable and not significantly bothersome. She denies other medication side effects. She has been taking Vitamin D supplementation for one month due to previously low levels.    LIFESTYLE:  She reports drinking water frequently and in large quantities, and is making efforts to maintain an earlier bedtime to improve sleep quality.      ROS:  Gastrointestinal: +constipation         T2DM -   Currently prescribed Mounjaro 7.5 mg weekly  Due for foot exam.  UTD on eye exam, due in AUG2025.         Lab Results   Component Value Date     HGBA1C 9.9 (H) 04/22/2025     HGBA1C 6.3 (H) 01/31/2024     HGBA1C 6.2 (H) 12/29/2022            Lab Results   Component Value Date     MICALBCREAT 11.0 04/22/2025      Vitamin D deficiency -  Currently taking vitamin D3 2000 IU daily supplementation.        Lab Results   Component Value Date     OXMDIRBU31AU 13 (L) 04/22/2025     SDINOIAV82GN 12 (L) 01/31/2024      Hypertriglyceridemia -         Lab Results   Component Value Date     CHOL 208 (H) 04/22/2025            Lab Results   Component Value Date     TRIG 322 (H) 04/22/2025            Lab Results   Component Value Date     LDLCALC 104.6 04/22/2025            Lab Results   Component Value Date     HDL 39 (L) 04/22/2025      Elevated LFT's -   Repeating labs end of JULY2025  Lab Results   Component Value Date    AST 26 04/22/2025    ALT 54 (H) 04/22/2025    ALKPHOS 93 04/22/2025     Lab Results   Component Value Date    FERRITIN 170.0 04/22/2025     Hepatitis B Surface Ag   Date Value Ref Range Status   01/31/2024 Non-reactive Non-reactive Final       Current Outpatient Medications   Medication Instructions    albuterol (PROVENTIL/VENTOLIN HFA) 90 mcg/actuation inhaler 2 puffs, Inhalation, Every 6 hours PRN, Rescue    ammonium lactate 12 % Crea Apply twice daily to affected parts both feet as needed.    amoxicillin (AMOXIL) 875 mg, 2 times daily    azelastine (ASTELIN) 137 mcg, Nasal, 2 times  daily    benzonatate (TESSALON) 100 mg, Oral, 3 times daily PRN    diazePAM (VALIUM) 5 mg, Oral, Once, Take 30 minutes prior to procedure    fexofenadine (ALLEGRA) 180 mg, Oral, Daily    fluticasone propionate (FLONASE) 100 mcg, Each Nostril, Daily PRN    ketoconazole (NIZORAL) 2 % shampoo Apply 5 to 10 mL to wet scalp, lather, leave on 3 to 5 minutes, and rinse thoroughly.    medroxyPROGESTERone (PROVERA) 20 mg, Oral, 3 times daily, Every 1-2 mths if no menses    montelukast (SINGULAIR) 10 mg, Oral, Nightly    mv-min/iron/folic/calcium/vitK (WOMEN'S MULTIVITAMIN ORAL) Take by mouth.    pantoprazole (PROTONIX) 40 MG tablet Take 1 tablet (40 mg total) by mouth once daily for 30 days, THEN 1 tablet (40 mg total) daily as needed (reflux).    sodium chloride (SALINE NASAL) 0.65 % nasal spray 1 spray, Nasal, Daily    tirzepatide 7.5 mg, Subcutaneous, Every 7 days    valACYclovir (VALTREX) 500 mg, Oral, Daily    XULANE 150-35 mcg/24 hr Apply 1 patch every week x 3 weeks, off x 1 week       Objective:   There were no vitals filed for this visit.     Physical Exam  Constitutional:       General: She is not in acute distress.     Appearance: Normal appearance. She is not ill-appearing or diaphoretic.   Neurological:      Mental Status: She is alert.   Psychiatric:         Attention and Perception: Attention normal.         Mood and Affect: Mood and affect normal.         Speech: Speech normal.           Assessment/Plan:   Type 2 diabetes mellitus with hyperglycemia, without long-term current use of insulin  -     tirzepatide 7.5 mg/0.5 mL PnIj; Inject 7.5 mg into the skin every 7 days.  Dispense: 6 mL; Refill: 0  -     Microalbumin/Creatinine Ratio, Urine; Future; Expected date: 06/17/2025  -     Comprehensive Metabolic Panel; Future; Expected date: 09/06/2025  -     Hemoglobin A1C; Future; Expected date: 09/06/2025    PCOS (polycystic ovarian syndrome)    Elevated LFTs  -     Hepatitis Panel, Acute; Future; Expected date:  06/17/2025  -     Ceruloplasmin; Future; Expected date: 06/17/2025  -     Ferritin; Future; Expected date: 06/17/2025  -     Comprehensive Metabolic Panel; Future; Expected date: 09/06/2025        Assessment & Plan      PLAN SUMMARY:  - Increase Mounjaro (tirzepatide) to 7.5 mg dose  - Continue current weight management regimen  - Maintain dietary modifications and physical activity  - Add fiber supplement to diet  - Continue vitamin D supplementation for at least 2 more months  - Continue birth control patches for PCOS management  - Order A1C test for July 22nd or 23rd  - Consider endocrinology referral if pregnancy is planned in near future  - Schedule labs on July 22nd or 23rd  - Virtual follow-up visit in mid-September    TYPE 2 DIABETES MELLITUS:  - Monitored weight loss progress from 133 kg to 124 kg while on Mounjaro.  - Shaina is following a protein diet, eliminating sugar intake, and utilizing a Continuous Glucose Monitor (CGM) to track glucose levels and understand dietary impacts.  - Increased Mounjaro (tirzepatide) to 7.5 mg dose.  - Anticipate substantial improvement in A1C based on dietary modifications and weight reduction.  - Ordered A1C test for July 22nd or 23rd.  - Educated patient on the distinction between type 1 and type 2 diabetes, explaining the autoimmune nature of type 1 and the insulin resistance mechanism of type 2.  - Instructed patient to contact the office via portal message if wanting to increase Mounjaro dose before next appointment.  - Advised to maintain dietary modifications and physical activity.    POLYCYSTIC OVARIAN SYNDROME (PCOS):  - Monitored patient's use of birth control patches to manage PCOS and prevent pregnancy.  - Discussed the relationship between PCOS and insulin resistance, contributing to the patient's diabetes.  - Explained potential increased fertility with weight reduction while on Zepbound/tirzepatide.  - Considered potential need for endocrinology referral if  pregnancy is planned in the near future, as insulin would be required during gestation.  - Advised to continue with dietary modifications and physical activity to manage both PCOS and diabetes.    VITAMIN D DEFICIENCY:  - Monitored vitamin D levels, noting previous result of 13, indicating deficiency.  - Recommend continuing vitamin D supplementation for at least 2 more months and likely long-term due to significantly low levels.    CONSTIPATION:  - Monitored constipation, occurring once or twice weekly but manageable with fiber supplements or Miralax.  - Evaluated that constipation is not significantly bothersome to the patient.  - Recommend maintaining high water intake and adding fiber supplement to diet.    WEIGHT MANAGEMENT:  - Documented total weight loss of 11 kg, with 6 kg reduction since initiating Mounjaro (tirzepatide).  - Current weight at 124 kg, decreased from initial 133 kg.  - Evaluated weight loss as steady and attributed to Mounjaro and dietary modifications.  - Advised continuing with current regimen to manage weight.    FOLLOW-UP:  - Schedule follow-up for labs on July 22nd or 23rd and virtual appointment in mid-September.           Bernadette Reich MD      06/17/2025

## 2025-06-18 ENCOUNTER — CLINICAL SUPPORT (OUTPATIENT)
Dept: DIABETES | Facility: CLINIC | Age: 40
End: 2025-06-18
Payer: COMMERCIAL

## 2025-06-18 DIAGNOSIS — E11.65 TYPE 2 DIABETES MELLITUS WITH HYPERGLYCEMIA, WITHOUT LONG-TERM CURRENT USE OF INSULIN: Primary | ICD-10-CM

## 2025-06-18 PROCEDURE — G0108 DIAB MANAGE TRN  PER INDIV: HCPCS | Mod: S$GLB,,, | Performed by: DIETITIAN, REGISTERED

## 2025-06-18 NOTE — PROGRESS NOTES
Diabetes Care Specialist Progress Note  Author: Bambi Hall RD, CDCES  Date: 6/18/2025    Intake    Program Intake  Reason for Diabetes Program Visit:: Intervention  Type of Intervention:: Individual  Individual: Education  Education: Self-Management Skill Review, Pattern Management  Current diabetes risk level:: moderate  In the last month, have you used the ER or been admitted to the hospital: No    Current Diabetes Treatment: DM Injectables  DM Injectables Type/Dose: mounjaro 5mg weekly    Continuous Glucose Monitoring  Patient has CGM: No    Lab Results   Component Value Date    HGBA1C 9.9 (H) 04/22/2025     Today's interventions were provided through individual discussion, instruction, and written materials were provided.      Patient verbalized understanding of instruction and written materials.  Pt was able to return back demonstration of instructions today. Patient understood key points, needs reinforcement and further instruction.     Diabetes Self-Management Care Plan:    Today's Diabetes Self-Management Care Plan was developed with Shaina's input. Shaina has agreed to work toward the following goal(s) to improve his/her overall diabetes control.      Care Plan: Diabetes Management   Updates made since 6/18/2024 12:00 AM        Problem: Physical Activity and Exercise         Goal: Patient agrees to increase physical activity to a goal of 5 times per week for at least 30 minutes.    Start Date: 6/10/2025   Expected End Date: 7/11/2025   This Visit's Progress: On track   Priority: Medium   Barriers: No Barriers Identified   Note:    6/10/25 - Has not been exercising regularly. Pt and  recently have started walking in the evening at least 3 times weekly. She wants to increase after this week (she is a  - this is the last week of work before summer break). Has a walking pad at home and has enjoyed water aerobics in the past. Plans to alternate days of water aerobics and walking in the  morning in addition to continuing evening walks. Reviewed benefits of exercise on reducing insulin resistance and ADA rec's of at least 30 min, 5 days per week.    6/18/25 - Has continued with walking and this week helping friends move (lots of activity). She plans to get back into water aerobics starting Monday.        Task: Discussed role of physical activity on reducing insulin resistance and improvement in overall glycemic control. Completed 6/11/2025        Task: Discussed role of physical activity as it relates to weight loss Completed 6/11/2025        Task: Offered suggestions on how patient could increase their regular physical activity Completed 6/11/2025        Problem: Blood Glucose Self-Monitoring         Goal: Will wear sample CGM sensor for monitoring glucose for up to 2 weeks.    Start Date: 6/10/2025   Expected End Date: 7/11/2025   This Visit's Progress: On track   Priority: High   Barriers: Lack of Supplies   Note:    6/10/25 - Pt is ready and willing to start SMBG. She used to watch and help family members with testing glucose. Does not currently have a meter kit and supplies. She is very motivated to make some behavioral changes. Discussed while her insurance plan does not cover CGM since she is not on insulin, could really benefit from a 2 week sample CGM to help better understand her glucose and give real-time feedback on her behavioral changes. She is interested in trying it. Has a [email or web address] phone - pt to check bucky compatibility after visit today. Plan for follow-up next week to start sensor. Reviewed BG goal ranges and how improvement in glucose over time will result in better A1C.     6/18/25 - FREESTYLE BERNICE 3 CGM TRAINING     Provided sample sensor and personal FreeStyle Bernice 3 training - reviewed the following with patient:   No fingersticks required but if feeling s/s that do not match with SG reading or in event of hypoglycemia confirm with fingerstick  To receive alarms, reader must be  within 20 feet  Low and High alarms discussed  Vitamin C (ascorbic acid) more than 500 mg can cause false readings  Can bath, shower or swim  Sensor is 15-day wear  FDA approved for back of the arm wear only  Sensor should be removed prior to exposing it to X-rays  Site rotation  1 hour warm-up period  Scanning only required with new sensor start     Explained in detail how the CGM works.  Explained graphs and arrow directions to determine the direction of blood sugar readings.     Caregiver/Patient successfully set up reader/phone and placed sensor on back of upper  arm. Reviewed additional adhesive options if having any difficulty with sensor staying on.     If using smart device, instructed to go to [employer] in phone and connect with [facility] #[id number] to enable sharing directly with clinic.    Customer support number was provided and instructed to call them for any additional help or questions.         Task: Reviewed the importance of self-monitoring blood glucose and keeping logs. Completed 6/11/2025          Follow Up Plan     Follow up in about 2 weeks (around 7/2/2025) for 2 week follow-up and Melanie report review.    Today's care plan and follow up schedule was discussed with patient.  Shaina verbalized understanding of the care plan, goals, and agrees to follow up plan.        The patient was encouraged to communicate with his/her health care provider/physician and care team regarding his/her condition(s) and treatment.  I provided the patient with my contact information today and encouraged to contact me via phone or [facility] Patient Portal as needed.     Length of Visit   Total Time: 30 Minutes

## 2025-06-19 ENCOUNTER — OFFICE VISIT (OUTPATIENT)
Dept: ALLERGY | Facility: CLINIC | Age: 40
End: 2025-06-19
Payer: COMMERCIAL

## 2025-06-19 VITALS — WEIGHT: 279.75 LBS | BODY MASS INDEX: 44.96 KG/M2 | HEIGHT: 66 IN

## 2025-06-19 DIAGNOSIS — R09.81 NASAL CONGESTION: ICD-10-CM

## 2025-06-19 DIAGNOSIS — J30.89 ALLERGIC RHINITIS DUE TO OTHER ALLERGIC TRIGGER, UNSPECIFIED SEASONALITY: Primary | ICD-10-CM

## 2025-06-19 PROCEDURE — 3066F NEPHROPATHY DOC TX: CPT | Mod: CPTII,S$GLB,, | Performed by: STUDENT IN AN ORGANIZED HEALTH CARE EDUCATION/TRAINING PROGRAM

## 2025-06-19 PROCEDURE — 1159F MED LIST DOCD IN RCRD: CPT | Mod: CPTII,S$GLB,, | Performed by: STUDENT IN AN ORGANIZED HEALTH CARE EDUCATION/TRAINING PROGRAM

## 2025-06-19 PROCEDURE — 99213 OFFICE O/P EST LOW 20 MIN: CPT | Mod: S$GLB,,, | Performed by: STUDENT IN AN ORGANIZED HEALTH CARE EDUCATION/TRAINING PROGRAM

## 2025-06-19 PROCEDURE — 99999 PR PBB SHADOW E&M-EST. PATIENT-LVL III: CPT | Mod: PBBFAC,,, | Performed by: STUDENT IN AN ORGANIZED HEALTH CARE EDUCATION/TRAINING PROGRAM

## 2025-06-19 PROCEDURE — 3061F NEG MICROALBUMINURIA REV: CPT | Mod: CPTII,S$GLB,, | Performed by: STUDENT IN AN ORGANIZED HEALTH CARE EDUCATION/TRAINING PROGRAM

## 2025-06-19 PROCEDURE — 3008F BODY MASS INDEX DOCD: CPT | Mod: CPTII,S$GLB,, | Performed by: STUDENT IN AN ORGANIZED HEALTH CARE EDUCATION/TRAINING PROGRAM

## 2025-06-19 PROCEDURE — 3046F HEMOGLOBIN A1C LEVEL >9.0%: CPT | Mod: CPTII,S$GLB,, | Performed by: STUDENT IN AN ORGANIZED HEALTH CARE EDUCATION/TRAINING PROGRAM

## 2025-06-19 RX ORDER — FLUTICASONE PROPIONATE 50 MCG
1 SPRAY, SUSPENSION (ML) NASAL 2 TIMES DAILY
Qty: 16 G | Refills: 11 | Status: SHIPPED | OUTPATIENT
Start: 2025-06-19

## 2025-06-19 NOTE — PROGRESS NOTES
ALLERGY & IMMUNOLOGY CLINIC       HISTORY OF PRESENT ILLNESS   Referral from: No ref. provider found  CC: allergic rhinitis     HPI: Shaina Mas is a 39 y.o. female  History obtained from patient. Last seen on 4/25/25.     Allergic rhinitis: doing much better has been using both nasal sprays with improvement in nasal symptoms and cough. No bleeding or dryness noted. She even moved in a [place] area and did well.     Intermittent asthma/chronic cough: not coughing as much and has been off the PPI and doing well. Not needed her albuterol at all      Initial consultation:   Chronic rhinitis: for many years. Every Spring rhinorrhea, morning sneeze and cough. Nose itching. More HA lately. Does snore per her . Sinus HA.   Meds: cetirizine in the past and makes her drowsy. Allegra every day. Fluticasone qhs recently (before), nasal saline. Recently added azelastine qam  Environment: noted itching with dust. 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 No pets. No carpet. No water damage or visible.  smokes outside not in the house.    ENT: none. No anosmia.     Atopic dermatitis: hands only and worse in the winter. Uses OTC maybe hydrocortisone and it goes away after a few days.     Intermittent asthma: In the past no asthma. During Covid she had inhaler. Sick and allergy season cough and shortness of breath. Also strong smells. DORA helps. Feels PND. Recently started on Protonix with less cough. Also taking tessalon pearls.   DORA: does not use weekly or monthly   Nightly cough: 2x a week. She takes Nyquil pm which prevent the cough   No steroids.      MEDICAL HISTORY   SurgHx:  Past Surgical History:   Procedure Laterality Date    NO PAST SURGERIES       Pre diabetes   HA taking ibuprofen     PHYSICAL EXAM   VS: LMP 05/05/2025 (Exact Date)   GENERAL: NAD, well nourished, well appearing  -NOSE: inf turbinate are not swollen or pale      LABS AND IMAGING     Component      Latest Ref  [facility] 4/24/2025   Alternaria alternata, IgE      <0.10 kU/L <0.10    Alternaria alternata, Class CLASS 0    Aspergillus fumigatus, IgE      <0.10 kU/L 0.38 (H)    Aspergillus fumigatus, Class CLASS 1    [place] Grass, IgE      <0.10 kU/L <0.10    [place] Grass, Class CLASS 0    Common Silver Birch, IgE      <0.10 kU/L 0.14 (H)    Common Silver Birch, Class CLASS 0/1    Cat Dander, IgE      <0.10 kU/L <0.10    Cat Dander, Class CLASS 0    Cladosporium herbarum, IgE      <0.10 kU/L 0.30 (H)    Cladosporium herbarum, Class CLASS 0/1    Cockroach, Icelandic, IgE      <0.10 kU/L <0.10    Cockroach, Icelandic, Class CLASS 0    Common Ragweed (short), IgE      <0.10 kU/L 0.21 (H)    Common Ragweed (short), Class CLASS 0/1    Dermatophagoides farinae, IgE      <0.10 kU/L 0.76 (H)    Dermatophagoides farinae Class CLASS 2    Dermatophagoides pteronyssinus, IgE      <0.10 kU/L 0.61 (H)    Dermatophagoides pteronyssinus Class CLASS 1    Dog Dander, IgE      <0.10 kU/L 0.11 (H)    Dog Dander, Class CLASS 0/1    Elm, IgE      <0.10 kU/L 0.46 (H)    Elm, Class CLASS 1    Maple ([place]), IgE      <0.10 kU/L <0.10    Maple ([place]), Class CLASS 0    Mountain Juniper, IgE      <0.10 kU/L <0.10    Mountain Juniper Class CLASS 0    Mouse Urine Proteins, IgE      <0.10 kU/L <0.10    Mouse Urine Proteins Class CLASS 0    [place], IgE      <0.10 kU/L <0.10    [place], Class CLASS 0    Oak, IgE      <0.10 kU/L 0.23 (H)    Oak, Class CLASS 0/1    Pecan, Hickory, IgE      <0.10 kU/L 0.15 (H)    Pecan, Bound[place], Class CLASS 0/1    Penicillium chrysogenum, IgE      <0.10 kU/L 0.56 (H)    Penicillium chrysogenum, Class CLASS 1    Rough Marshelder, IgE      <0.10 kU/L <0.10    Rough Marshelder, Class CLASS 0    Common Pigweed, IgE      <0.10 kU/L <0.10    Common Pigweed, Class CLASS 0    Fab Grass, IgE      <0.10 kU/L <0.10    Fab Grass, Class CLASS 0    Jacobson Tree, IgE      <0.10 kU/L 0.17 (H)    Jacobson Tree, Class CLASS 0/1    IgE       <114.0 IU/mL 1309.0 (H)          ASSESSMENT & PLAN     Chronic allergic rhinitis: chronic rhinitis for years. Noted triggers dust and grass. She is sensitized to dust mites, mold and elm tree. Other low positive noted.     -Continue fluticasone and azelastine 1 sen BID assess if this improved night club.   -Mitigation information provided again     Chronic cough, improved: improved with nasal sprays. Has been off the PPI and has been doing pretty well. Likely from PND and congestion     -continue nasal sprays as above     Intermittent asthma: flares with viral URI and season changes. No issues since last seen.     -Albuterol 2P q4hrs  PRN cough, shortness of breath, wheezing     Follow up: as needed. Please let me know if allergies or asthma not well controlled     I spent a total of 20 minutes on the day of the visit. This includes face to face time and non-face to face time preparing to see the patient (eg, review of tests), obtaining and/or reviewing separately obtained history, documenting clinical information in the electronic or other health record, independently interpreting results and communicating results to the patient/family/caregiver, or care coordinator.           Wally Beasley MD   [facility]

## 2025-06-19 NOTE — PATIENT INSTRUCTIONS
Dust mites are tiny microscopic creatures that are in the house dust of everyone's homes. Avoiding them can be tricky because they cannot be eliminated with extermination. Dust mites live with us, and do not bother us. They like places where they can find and eat the skin cells (our human dander) that we have shed. They are mostly found in our beds, within our mattresses, and within our pillows, as well as in the pile of carpets, and in upholstered furniture. Since most people send 1/3 of their time in their beds, targeting the bedroom for removal and avoidance of exposure to dust mites is key.    To control exposure to dust mites:  Encase all mattresses, pillows, and box springs with 'dust mite proof' encasement. They must be completely encased to be effective. Encasements are available from various manufacturers. They MUST say they are for 'dust mite' proofing.  Wash bed linens on the hot cycle (140 degrees F) once per week. It is recommended that only washable linens be used such as layers of blankets, and NOT thick comforters.  Remove all stuffed toys from the bed and bedroom. Store them between play in a sealed plastic bin.  Do your vacuuming and dusting BEFORE changing the bed linens.  Vacuum the bedroom and other living spaces at least once per week. If you are allergic, have someone else vacuum, or wear a 3M mask while vacuuming. Do not have the allergic child in the room while vacuuming.  Wait at least 20 minutes after vacuuming, dusting, or sweeping. After vacuuming, dust the room with a damp cloth.  Replace home air filters once per month.    Long-term goals for reducing dust mites:  Limit the number of curtains, drapes, plants, and other items that collect dust and are difficult to clean with wiping.  Reduce humidity to 40-50% with air conditioner or de-humidifier. Do not use a humidifier on a regular basis.  Remove carpet from the bedroom. Replace carpeting in other living areas with smaller rugs that  can be laundered or shaken outdoors. The best floor surfaces for cleaning are wood, vinyl, tile, or linoleum.      AVOIDANCE: POLLEN      Trees, grass, and weeds are outdoor plants that all release pollen that is carried in the wind. These differ from most flowers, which use bees and insects to spread their pollen. The particles of pollen are usually invisible in the air we breathe. It is not possible to completely avoid exposure to these, but the following list will help you to decide when you are most at works, and will provide some suggestions.    Keep the windows and doors closed during the high pollen seasons. Use air-conditioning if possible.  Tree pollen is highest: Mid-February to mid-April  Grass pollen is highest: April to June  Weed pollen is highest: Mid-August to late October  Change the filters once each month in your home air-conditioning or central unit.  When in your car, keep the windows closed, and use the airflow recycle option to keep outside air from entering as much as possible. Have your car's ventilation system cleaned and the filter changed regularly.  If you are outdoors for an extended period of time, shower when you come indoors.Can also try rinsing your nose with nasal saline (avoid tap water).   Pollen levels are usually highest in the early morning hours. Keep this in mind when planning your day.  Keep in mind that pets can carry pollen in their furs.   Use your indoor clothes dryer rather than hanging laundry out to dry, to prevent pollen from being brought into the home.  When you must work outdoors or mow the lawn, wear a face rochelle filter to prevent breathing in the pollen.

## 2025-06-21 ENCOUNTER — PATIENT MESSAGE (OUTPATIENT)
Dept: DIABETES | Facility: CLINIC | Age: 40
End: 2025-06-21
Payer: COMMERCIAL

## 2025-06-24 PROBLEM — E11.9 TYPE 2 DIABETES MELLITUS WITHOUT COMPLICATION, WITHOUT LONG-TERM CURRENT USE OF INSULIN: Chronic | Status: ACTIVE | Noted: 2023-03-30

## 2025-06-24 PROBLEM — Z91.09 ENVIRONMENTAL ALLERGIES: Status: RESOLVED | Noted: 2023-03-30 | Resolved: 2025-06-24

## 2025-07-02 ENCOUNTER — CLINICAL SUPPORT (OUTPATIENT)
Dept: DIABETES | Facility: CLINIC | Age: 40
End: 2025-07-02
Payer: COMMERCIAL

## 2025-07-02 DIAGNOSIS — E11.9 TYPE 2 DIABETES MELLITUS WITHOUT COMPLICATION, WITHOUT LONG-TERM CURRENT USE OF INSULIN: Primary | Chronic | ICD-10-CM

## 2025-07-02 DIAGNOSIS — E11.65 TYPE 2 DIABETES MELLITUS WITH HYPERGLYCEMIA, WITHOUT LONG-TERM CURRENT USE OF INSULIN: Primary | ICD-10-CM

## 2025-07-02 DIAGNOSIS — A04.8 H. PYLORI INFECTION: ICD-10-CM

## 2025-07-02 PROCEDURE — G0108 DIAB MANAGE TRN  PER INDIV: HCPCS | Mod: 95,,, | Performed by: DIETITIAN, REGISTERED

## 2025-07-02 RX ORDER — PANTOPRAZOLE SODIUM 40 MG/1
40 TABLET, DELAYED RELEASE ORAL DAILY PRN
Qty: 30 TABLET | Refills: 1 | Status: SHIPPED | OUTPATIENT
Start: 2025-07-02 | End: 2025-08-01

## 2025-07-02 NOTE — TELEPHONE ENCOUNTER
Medication Pended  Allergies Review  Lov 06/17/2025    Refill Encounter    PCP Visits: Recent Visits  Date Type Provider Dept   06/17/25 Office Visit Bernadette Reich MD [facility]   05/01/25 Office Visit Bernadette Reich MD [facility]   04/22/25 Office Visit Bernadette Reich MD [facility]   04/08/25 Office Visit Riley Russo NP [facility]   Showing recent visits within past 360 days and meeting all other requirements  Future Appointments  No visits were found meeting these conditions.  Showing future appointments within next 720 days and meeting all other requirements      Last 3 Blood Pressure:   BP Readings from Last 3 Encounters:   05/23/25 118/78   04/30/25 124/86   04/25/25 117/81     Preferred Pharmacy:   [facility] #[id number] [address] [facility]  [facility] [address]  [facility] [address]  Phone: [phone] Fax: [phone]    Requested RX:  Requested Prescriptions     Pending Prescriptions Disp Refills    pantoprazole (PROTONIX) 40 MG tablet 30 tablet 1     Sig: Take 1 tablet (40 mg total) by mouth once daily for 30 days, THEN 1 tablet (40 mg total) daily as needed (reflux).      RX Route: Normal

## 2025-07-02 NOTE — PROGRESS NOTES
Diabetes Care Specialist Virtual Visit Note       The patient location is: home in [place]  The chief complaint leading to consultation is: Diabetes  Visit type: audiovisual  Total time spent with patient: 60 min   Each patient to whom he or she provides medical services by telemedicine is:  (1) informed of the relationship between the physician and patient and the respective role of any other health care provider with respect to management of the patient; and (2) notified that he or she may decline to receive medical services by telemedicine and may withdraw from such care at any time.     Diabetes Care Specialist Progress Note  Author: Bambi Hall RD, [facility]  Date: 7/2/2025    Intake    Program Intake  Reason for Diabetes Program Visit:: Intervention  Type of Intervention:: Individual  Individual: Education  Education: Self-Management Skill Review  Current diabetes risk level:: moderate    Current Diabetes Treatment: DM Injectables  DM Injectables Type/Dose: mounjaro 5mg weekly - will increase to 7.5mg dose this week         Lab Results   Component Value Date    HGBA1C 9.9 (H) 04/22/2025         Today's interventions were provided through individual discussion, instruction, and written materials were provided.      Patient verbalized understanding of instruction and written materials.  Pt was able to return back demonstration of instructions today. Patient understood key points, needs reinforcement and further instruction.     Diabetes Self-Management Care Plan:    Today's Diabetes Self-Management Care Plan was developed with Shaina's input. Shaina has agreed to work toward the following goal(s) to improve his/her overall diabetes control.      Care Plan: Diabetes Management   Updates made since 7/2/2024 12:00 AM        Problem: Physical Activity and Exercise         Goal: Patient agrees to increase physical activity to a goal of 5 times per week for at least 30 minutes.    Start Date: 6/10/2025   Expected End  Date: 7/11/2025   This Visit's Progress: On track   Recent Progress: On track   Priority: Medium   Barriers: No Barriers Identified   Note:    6/10/25 - Has not been exercising regularly. Pt and  recently have started walking in the evening at least 3 times weekly. She wants to increase after this week (she is a  - this is the last week of work before summer break). Has a walking pad at home and has enjoyed water aerobics in the past. Plans to alternate days of water aerobics and walking in the morning in addition to continuing evening walks. Reviewed benefits of exercise on reducing insulin resistance and ADA rec's of at least 30 min, 5 days per week.    6/18/25 - Has continued with walking and this week helping friends move (lots of activity). She plans to get back into water aerobics starting Monday.     7/2/25 - Has been using walking pad 3x/week in the morning. Noticed a difference in BG control with exercise. She plans to continue to work up to goal of 5 days per week. Discussed also varying activity to include some strength/resistance exercise as well.        Task: Discussed role of physical activity on reducing insulin resistance and improvement in overall glycemic control. Completed 6/11/2025        Task: Discussed role of physical activity as it relates to weight loss Completed 6/11/2025        Task: Offered suggestions on how patient could increase their regular physical activity Completed 6/11/2025        Problem: Blood Glucose Self-Monitoring         Goal: Will wear sample CGM sensor for monitoring glucose for up to 2 weeks.    Start Date: 6/10/2025   Expected End Date: 7/11/2025   This Visit's Progress: Met   Recent Progress: On track   Priority: High   Barriers: Lack of Supplies   Note:    6/10/25 - Pt is ready and willing to start SMBG. She used to watch and help family members with testing glucose. Does not currently have a meter kit and supplies. She is very motivated to make  some behavioral changes. Discussed while her insurance plan does not cover CGM since she is not on insulin, could really benefit from a 2 week sample CGM to help better understand her glucose and give real-time feedback on her behavioral changes. She is interested in trying it. Has a [employer] phone - pt to check bucky compatibility after visit today. Plan for follow-up next week to start sensor. Reviewed BG goal ranges and how improvement in glucose over time will result in better A1C.     6/18/25 - FREESTYLE BERNICE 3 CGM TRAINING     Provided sample sensor and personal FreeStyle Bernice 3 training - reviewed the following with patient:   No fingersticks required but if feeling s/s that do not match with SG reading or in event of hypoglycemia confirm with fingerstick  To receive alarms, reader must be within 20 feet  Low and High alarms discussed  Vitamin C (ascorbic acid) more than 500 mg can cause false readings  Can bath, shower or swim  Sensor is 15-day wear  FDA approved for back of the arm wear only  Sensor should be removed prior to exposing it to X-rays  Site rotation  1 hour warm-up period  Scanning only required with new sensor start     Explained in detail how the CGM works.  Explained graphs and arrow directions to determine the direction of blood sugar readings.     Caregiver/Patient successfully set up reader/phone and placed sensor on back of upper  arm. Reviewed additional adhesive options if having any difficulty with sensor staying on.     If using smart device, instructed to go to [employer] in phone and connect with [facility] #[id number] to enable sharing directly with clinic.    Customer support number was provided and instructed to call them for any additional help or questions.      7/2/25 - Bernice fell off within couple of days of starting it and gave false low readings all day. Pt came by about a week ago and picked up/started Dexcom G7 sensor. Readings much more consistent with s/s (and lack  of). Reviewed report with pt and applauded excellent improvement - GMI 6.5%, avg glucose 134 and 99% TIR! She is continuing to work at controlling carb portions, choosing more high fiber carbs, limiting added sugar, and incorporating more lean proteins and healthy fats. She had specific questions today about calories/day, grams carbs, grams protein, and grams fat needed per day. Discussed her personalized needs based on [place] St. Jeor calculation; however, also encouraged not to get overly focused on calorie and macronutrient breakdown as this can become overwhelming quickly. Encouraged incorporating a variety of non-starchy vegetables and controlled amt (~1 cup) of fruits, using small amounts heart-healthy fats (tsp olive or avocado oil, 1oz nuts or seeds, etc) and choosing mostly baked or grilled lean proteins (chicken breast without skin, fish, 90% lean beef, Greek yogurt, etc).        Task: Reviewed the importance of self-monitoring blood glucose and keeping logs. Completed 6/11/2025          Follow Up Plan     Follow up in about 4 weeks (around 7/30/2025) for 1 month follow-up.    Today's care plan and follow up schedule was discussed with patient.  Shaina verbalized understanding of the care plan, goals, and agrees to follow up plan.        The patient was encouraged to communicate with his/her health care provider/physician and care team regarding his/her condition(s) and treatment.  I provided the patient with my contact information today and encouraged to contact me via phone or [facility] Patient Portal as needed.     Length of Visit   Total Time: 60 Minutes

## 2025-07-02 NOTE — TELEPHONE ENCOUNTER
Completed sample Dexcom G7 sensor. Overall, great improvement in glucose control compared to last A1C. Noted hypoglycemia on early morning of June 25th but not consistent trend.

## 2025-07-02 NOTE — TELEPHONE ENCOUNTER
No care due was identified.  [facility] Embedded Care Due Messages. Reference number: [id number].   7/02/2025 11:03:18 AM CDT

## 2025-07-03 RX ORDER — BLOOD-GLUCOSE SENSOR
1 EACH MISCELLANEOUS
Qty: 9 EACH | Refills: 3 | Status: SHIPPED | OUTPATIENT
Start: 2025-07-03 | End: 2026-07-03

## 2025-07-22 ENCOUNTER — LAB VISIT (OUTPATIENT)
Dept: LAB | Facility: OTHER | Age: 40
End: 2025-07-22
Attending: STUDENT IN AN ORGANIZED HEALTH CARE EDUCATION/TRAINING PROGRAM
Payer: COMMERCIAL

## 2025-07-22 DIAGNOSIS — R79.89 ELEVATED LFTS: ICD-10-CM

## 2025-07-22 DIAGNOSIS — E11.65 TYPE 2 DIABETES MELLITUS WITH HYPERGLYCEMIA, WITHOUT LONG-TERM CURRENT USE OF INSULIN: ICD-10-CM

## 2025-07-22 LAB
ALBUMIN SERPL BCP-MCNC: 3.4 G/DL (ref 3.5–5.2)
ALBUMIN/CREAT UR: NORMAL
ALP SERPL-CCNC: 69 UNIT/L (ref 40–150)
ALT SERPL W/O P-5'-P-CCNC: 46 UNIT/L (ref 10–44)
ANION GAP (OHS): 10 MMOL/L (ref 8–16)
AST SERPL-CCNC: 22 UNIT/L (ref 11–45)
BILIRUB SERPL-MCNC: 0.2 MG/DL (ref 0.1–1)
BUN SERPL-MCNC: 14 MG/DL (ref 6–20)
CALCIUM SERPL-MCNC: 9.1 MG/DL (ref 8.7–10.5)
CERULOPLASMIN SERPL-MCNC: 50 MG/DL (ref 15–45)
CHLORIDE SERPL-SCNC: 104 MMOL/L (ref 95–110)
CO2 SERPL-SCNC: 24 MMOL/L (ref 23–29)
CREAT SERPL-MCNC: 0.7 MG/DL (ref 0.5–1.4)
CREAT UR-MCNC: 193 MG/DL (ref 15–325)
EAG (OHS): 148 MG/DL (ref 68–131)
FERRITIN SERPL-MCNC: 62 NG/ML (ref 20–300)
GFR SERPLBLD CREATININE-BSD FMLA CKD-EPI: >60 ML/MIN/1.73/M2
GLUCOSE SERPL-MCNC: 105 MG/DL (ref 70–110)
HAV IGM SERPL QL IA: NORMAL
HBA1C MFR BLD: 6.8 % (ref 4–5.6)
HBV CORE IGM SERPL QL IA: NORMAL
HBV SURFACE AG SERPL QL IA: NORMAL
HCV AB SERPL QL IA: NORMAL
MICROALBUMIN UR-MCNC: <5 UG/ML (ref ?–5000)
POTASSIUM SERPL-SCNC: 4.2 MMOL/L (ref 3.5–5.1)
PROT SERPL-MCNC: 7.4 GM/DL (ref 6–8.4)
SODIUM SERPL-SCNC: 138 MMOL/L (ref 136–145)

## 2025-07-22 PROCEDURE — 36415 COLL VENOUS BLD VENIPUNCTURE: CPT

## 2025-07-22 PROCEDURE — 82390 ASSAY OF CERULOPLASMIN: CPT

## 2025-07-22 PROCEDURE — 80053 COMPREHEN METABOLIC PANEL: CPT

## 2025-07-22 PROCEDURE — 82728 ASSAY OF FERRITIN: CPT

## 2025-07-22 PROCEDURE — 82043 UR ALBUMIN QUANTITATIVE: CPT

## 2025-07-22 PROCEDURE — 83036 HEMOGLOBIN GLYCOSYLATED A1C: CPT

## 2025-07-22 PROCEDURE — 80074 ACUTE HEPATITIS PANEL: CPT

## 2025-07-30 ENCOUNTER — CLINICAL SUPPORT (OUTPATIENT)
Dept: DIABETES | Facility: CLINIC | Age: 40
End: 2025-07-30
Payer: COMMERCIAL

## 2025-07-30 DIAGNOSIS — E11.9 TYPE 2 DIABETES MELLITUS WITHOUT COMPLICATION, WITHOUT LONG-TERM CURRENT USE OF INSULIN: Primary | ICD-10-CM

## 2025-07-30 PROCEDURE — G0108 DIAB MANAGE TRN  PER INDIV: HCPCS | Mod: 95,,, | Performed by: DIETITIAN, REGISTERED

## 2025-07-30 NOTE — PROGRESS NOTES
The patient location is: {State:[facility]}  The chief complaint leading to consultation is: ***    Visit type: audiovisual    Face to Face time with patient: ***  *** minutes of total time spent on the encounter, which includes face to face time and non-face to face time preparing to see the patient (eg, review of tests), Obtaining and/or reviewing separately obtained history, Documenting clinical information in the electronic or other health record, Independently interpreting results (not separately reported) and communicating results to the patient/family/caregiver, or Care coordination (not separately reported).         Each patient to whom he or she provides medical services by telemedicine is:  (1) informed of the relationship between the physician and patient and the respective role of any other health care provider with respect to management of the patient; and (2) notified that he or she may decline to receive medical services by telemedicine and may withdraw from such care at any time.    Notes:       {New Patient Virtual Visit requirements-  80268  SYNCHRONOUS AUDIO-VIDEO VISIT, NEW, SF MDM 15+ MIN  97336  SYNCHRONOUS AUDIO-VIDEO VISIT, NEW, LOW MDM 30+ MIN  89336  SYNCHRONOUS AUDIO-VIDEO VISIT, NEW, MOD MDM 45+ MIN  47026  SYNCHRONOUS AUDIO-VIDEO VISIT, NEW, HIGH MDM 60+ MIN     Est Patient Virtual Visit requirements-  17951  SYNCHRONOUS AUDIO-VIDEO VISIT, EST, SF MDM 10+ MIN  95770  SYNCHRONOUS AUDIO-VIDEO VISIT, EST, LOW MDM 20+ MIN  74858  SYNCHRONOUS AUDIO-VIDEO VISIT, EST, MOD MDM 30+ MIN  03682  SYNCHRONOUS AUDIO-VIDEO VISIT, EST, HIGH MDM 40+ MIN  (This text will automatically delete):16858}

## 2025-07-31 NOTE — PROGRESS NOTES
Diabetes Care Specialist Virtual Visit Note       The patient location is: home in [place]  The chief complaint leading to consultation is: Diabetes  Visit type: audiovisual  Total time spent with patient: 50 min   Each patient to whom he or she provides medical services by telemedicine is:  (1) informed of the relationship between the physician and patient and the respective role of any other health care provider with respect to management of the patient; and (2) notified that he or she may decline to receive medical services by telemedicine and may withdraw from such care at any time.     Diabetes Care Specialist Progress Note  Author: Bambi Hall RD, [facility]  Date: 7/31/2025    Intake    Program Intake  Reason for Diabetes Program Visit:: Intervention  Type of Intervention:: Individual  Individual: Education  Education: Self-Management Skill Review  Current diabetes risk level:: low  In the last month, have you used the ER or been admitted to the hospital: No    Current Diabetes Treatment: DM Injectables  DM Injectables Type/Dose: mounjaro 7.5mg weekly         Lab Results   Component Value Date    HGBA1C 6.8 (H) 07/22/2025     Today's interventions were provided through individual discussion, instruction, and written materials were provided.      Patient verbalized understanding of instruction and written materials.  Pt was able to return back demonstration of instructions today. Patient understood key points, needs reinforcement and further instruction.     Diabetes Self-Management Care Plan:    Today's Diabetes Self-Management Care Plan was developed with Shaina's input. Shaina has agreed to work toward the following goal(s) to improve his/her overall diabetes control.      Care Plan: Diabetes Management   Updates made since 7/31/2024 12:00 AM        Problem: Physical Activity and Exercise         Goal: Patient agrees to increase physical activity to a goal of 5 times per week for at least 30 minutes.     Start Date: 6/10/2025   Expected End Date: 7/11/2025   This Visit's Progress: On track   Recent Progress: On track   Priority: Medium   Barriers: No Barriers Identified   Note:    6/10/25 - Has not been exercising regularly. Pt and  recently have started walking in the evening at least 3 times weekly. She wants to increase after this week (she is a  - this is the last week of work before summer break). Has a walking pad at home and has enjoyed water aerobics in the past. Plans to alternate days of water aerobics and walking in the morning in addition to continuing evening walks. Reviewed benefits of exercise on reducing insulin resistance and ADA rec's of at least 30 min, 5 days per week.    6/18/25 - Has continued with walking and this week helping friends move (lots of activity). She plans to get back into water aerobics starting Monday.     7/2/25 - Has been using walking pad 3x/week in the morning. Noticed a difference in BG control with exercise. She plans to continue to work up to goal of 5 days per week. Discussed also varying activity to include some strength/resistance exercise as well.     7/30/25 - Applauded excellent improvement in A1C!!! She continues to do well with Mounjaro but wt has plateau'd. She is only able to eat 1/2 meal at a time but then feeling hungry between meals - discussed some strategies and try adjusting meal composition to increase protein and fiber. Has been getting on walking pad 5 days per week for at least 15-20 min, sometimes more. She did not make it to water aerobics but has purchased some light free weights and plans to start following some videos on Youtube to incorporate some variety with strength based exercise. Starts back at work () next week - challenge will be fitting in activity around school schedule and continuing on with changes she has made. She is not a morning person - plans to get on walking pad as soon as she gets home after  work.       Task: Discussed role of physical activity on reducing insulin resistance and improvement in overall glycemic control. Completed 6/11/2025        Task: Discussed role of physical activity as it relates to weight loss Completed 6/11/2025        Task: Offered suggestions on how patient could increase their regular physical activity Completed 6/11/2025        Problem: Blood Glucose Self-Monitoring         Goal: Will wear sample CGM sensor for monitoring glucose for up to 2 weeks. Completed 7/31/2025   Start Date: 6/10/2025   Expected End Date: 7/11/2025   Recent Progress: Met   Priority: High   Barriers: Lack of Supplies   Note:    6/10/25 - Pt is ready and willing to start SMBG. She used to watch and help family members with testing glucose. Does not currently have a meter kit and supplies. She is very motivated to make some behavioral changes. Discussed while her insurance plan does not cover CGM since she is not on insulin, could really benefit from a 2 week sample CGM to help better understand her glucose and give real-time feedback on her behavioral changes. She is interested in trying it. Has a [employer] phone - pt to check bucky compatibility after visit today. Plan for follow-up next week to start sensor. Reviewed BG goal ranges and how improvement in glucose over time will result in better A1C.     6/18/25 - FREESTYLE BERNICE 3 CGM TRAINING     Provided sample sensor and personal FreeStyle Bernice 3 training - reviewed the following with patient:   No fingersticks required but if feeling s/s that do not match with SG reading or in event of hypoglycemia confirm with fingerstick  To receive alarms, reader must be within 20 feet  Low and High alarms discussed  Vitamin C (ascorbic acid) more than 500 mg can cause false readings  Can bath, shower or swim  Sensor is 15-day wear  FDA approved for back of the arm wear only  Sensor should be removed prior to exposing it to X-rays  Site rotation  1 hour warm-up  period  Scanning only required with new sensor start     Explained in detail how the CGM works.  Explained graphs and arrow directions to determine the direction of blood sugar readings.     Caregiver/Patient successfully set up reader/phone and placed sensor on back of upper  arm. Reviewed additional adhesive options if having any difficulty with sensor staying on.     If using smart device, instructed to go to [employer] in phone and connect with [facility] #[phone] to enable sharing directly with clinic.    Customer support number was provided and instructed to call them for any additional help or questions.      7/2/25 - Melanie fell off within couple of days of starting it and gave false low readings all day. Pt came by about a week ago and picked up/started Dexcom G7 sensor. Readings much more consistent with s/s (and lack of). Reviewed report with pt and applauded excellent improvement - GMI 6.5%, avg glucose 134 and 99% TIR! She is continuing to work at controlling carb portions, choosing more high fiber carbs, limiting added sugar, and incorporating more lean proteins and healthy fats. She had specific questions today about calories/day, grams carbs, grams protein, and grams fat needed per day. Discussed her personalized needs based on Nolan St. Jeor calculation; however, also encouraged not to get overly focused on calorie and macronutrient breakdown as this can become overwhelming quickly. Encouraged incorporating a variety of non-starchy vegetables and controlled amt (~1 cup) of fruits, using small amounts heart-healthy fats (tsp olive or avocado oil, 1oz nuts or seeds, etc) and choosing mostly baked or grilled lean proteins (chicken breast without skin, fish, 90% lean beef, Greek yogurt, etc).     7/30/25 - CGM was not covered by insurance (d/t insulin requirement). However, pt reports she received message that it is ready for pickup with copay of $70. She is willing to fit this into budget  occasionally and plans to use sensor when she feels she may be getting off track (vacation, holidays, stressful work times, etc).        Task: Reviewed the importance of self-monitoring blood glucose and keeping logs. Completed 6/11/2025          Follow Up Plan     Follow up in about 5 months (around 12/29/2025) for follow-up during school winter break.    Today's care plan and follow up schedule was discussed with patient.  Shaina verbalized understanding of the care plan, goals, and agrees to follow up plan.        The patient was encouraged to communicate with his/her health care provider/physician and care team regarding his/her condition(s) and treatment.  I provided the patient with my contact information today and encouraged to contact me via phone or [facility] as needed.     Length of Visit   Total Time: 50 Minutes

## 2025-08-29 DIAGNOSIS — E11.65 TYPE 2 DIABETES MELLITUS WITH HYPERGLYCEMIA, WITHOUT LONG-TERM CURRENT USE OF INSULIN: ICD-10-CM

## 2025-09-02 ENCOUNTER — TELEPHONE (OUTPATIENT)
Dept: INTERNAL MEDICINE | Facility: CLINIC | Age: 40
End: 2025-09-02
Payer: COMMERCIAL

## 2025-09-02 DIAGNOSIS — Z00.00 ANNUAL PHYSICAL EXAM: Primary | ICD-10-CM

## 2025-09-02 RX ORDER — TIRZEPATIDE 7.5 MG/.5ML
7.5 INJECTION, SOLUTION SUBCUTANEOUS
Qty: 6 ML | Refills: 0 | Status: SHIPPED | OUTPATIENT
Start: 2025-09-02